# Patient Record
Sex: FEMALE | Race: WHITE | NOT HISPANIC OR LATINO | Employment: OTHER | URBAN - METROPOLITAN AREA
[De-identification: names, ages, dates, MRNs, and addresses within clinical notes are randomized per-mention and may not be internally consistent; named-entity substitution may affect disease eponyms.]

---

## 2017-01-05 ENCOUNTER — GENERIC CONVERSION - ENCOUNTER (OUTPATIENT)
Dept: OTHER | Facility: OTHER | Age: 72
End: 2017-01-05

## 2017-01-17 ENCOUNTER — TRANSCRIBE ORDERS (OUTPATIENT)
Dept: ADMINISTRATIVE | Facility: HOSPITAL | Age: 72
End: 2017-01-17

## 2017-01-17 DIAGNOSIS — N64.9 UNSPECIFIED BREAST DISORDER: Primary | ICD-10-CM

## 2017-01-31 ENCOUNTER — GENERIC CONVERSION - ENCOUNTER (OUTPATIENT)
Dept: OTHER | Facility: OTHER | Age: 72
End: 2017-01-31

## 2017-04-21 ENCOUNTER — HOSPITAL ENCOUNTER (OUTPATIENT)
Dept: ULTRASOUND IMAGING | Facility: CLINIC | Age: 72
Discharge: HOME | End: 2017-04-21
Payer: MEDICARE

## 2017-04-21 DIAGNOSIS — N64.9 BREAST DISORDER: ICD-10-CM

## 2017-04-21 PROCEDURE — 76642 ULTRASOUND BREAST LIMITED: CPT

## 2017-04-24 ENCOUNTER — GENERIC CONVERSION - ENCOUNTER (OUTPATIENT)
Dept: OTHER | Facility: OTHER | Age: 72
End: 2017-04-24

## 2017-06-06 ENCOUNTER — ALLSCRIPTS OFFICE VISIT (OUTPATIENT)
Dept: OTHER | Facility: OTHER | Age: 72
End: 2017-06-06

## 2017-06-06 ENCOUNTER — TRANSCRIBE ORDERS (OUTPATIENT)
Dept: ADMINISTRATIVE | Facility: HOSPITAL | Age: 72
End: 2017-06-06

## 2017-06-06 ENCOUNTER — HOSPITAL ENCOUNTER (OUTPATIENT)
Dept: RADIOLOGY | Facility: HOSPITAL | Age: 72
Discharge: HOME | End: 2017-06-06
Payer: MEDICARE

## 2017-06-06 DIAGNOSIS — M79.672 PAIN OF LEFT FOOT: ICD-10-CM

## 2017-06-06 DIAGNOSIS — R09.89 OTHER SPECIFIED SYMPTOMS AND SIGNS INVOLVING THE CIRCULATORY AND RESPIRATORY SYSTEMS: ICD-10-CM

## 2017-06-06 DIAGNOSIS — Z13.820 ENCOUNTER FOR SCREENING FOR OSTEOPOROSIS: ICD-10-CM

## 2017-06-06 PROCEDURE — 73630 X-RAY EXAM OF FOOT: CPT

## 2017-06-07 ENCOUNTER — GENERIC CONVERSION - ENCOUNTER (OUTPATIENT)
Dept: OTHER | Facility: OTHER | Age: 72
End: 2017-06-07

## 2017-06-23 ENCOUNTER — GENERIC CONVERSION - ENCOUNTER (OUTPATIENT)
Dept: OTHER | Facility: OTHER | Age: 72
End: 2017-06-23

## 2017-07-24 ENCOUNTER — GENERIC CONVERSION - ENCOUNTER (OUTPATIENT)
Dept: OTHER | Facility: OTHER | Age: 72
End: 2017-07-24

## 2017-09-12 ENCOUNTER — ALLSCRIPTS OFFICE VISIT (OUTPATIENT)
Dept: OTHER | Facility: OTHER | Age: 72
End: 2017-09-12

## 2017-09-26 ENCOUNTER — GENERIC CONVERSION - ENCOUNTER (OUTPATIENT)
Dept: OTHER | Facility: OTHER | Age: 72
End: 2017-09-26

## 2017-10-05 ENCOUNTER — HOSPITAL ENCOUNTER (OUTPATIENT)
Dept: NON INVASIVE DIAGNOSTICS | Facility: CLINIC | Age: 72
Discharge: HOME | End: 2017-10-05
Payer: MEDICARE

## 2017-10-05 ENCOUNTER — GENERIC CONVERSION - ENCOUNTER (OUTPATIENT)
Dept: OTHER | Facility: OTHER | Age: 72
End: 2017-10-05

## 2017-10-05 DIAGNOSIS — R09.89 OTHER SPECIFIED SYMPTOMS AND SIGNS INVOLVING THE CIRCULATORY AND RESPIRATORY SYSTEMS: ICD-10-CM

## 2017-10-05 PROCEDURE — 93880 EXTRACRANIAL BILAT STUDY: CPT

## 2017-10-24 DIAGNOSIS — N64.89 OTHER SPECIFIED DISORDERS OF BREAST: ICD-10-CM

## 2017-10-24 DIAGNOSIS — Z12.31 ENCOUNTER FOR SCREENING MAMMOGRAM FOR MALIGNANT NEOPLASM OF BREAST: ICD-10-CM

## 2017-10-25 ENCOUNTER — HOSPITAL ENCOUNTER (OUTPATIENT)
Dept: ULTRASOUND IMAGING | Facility: CLINIC | Age: 72
Discharge: HOME | End: 2017-10-25
Payer: MEDICARE

## 2017-10-25 ENCOUNTER — HOSPITAL ENCOUNTER (OUTPATIENT)
Dept: MAMMOGRAPHY | Facility: CLINIC | Age: 72
Discharge: HOME | End: 2017-10-25
Payer: MEDICARE

## 2017-10-25 DIAGNOSIS — Z12.31 ENCOUNTER FOR SCREENING MAMMOGRAM FOR MALIGNANT NEOPLASM OF BREAST: ICD-10-CM

## 2017-10-25 DIAGNOSIS — N64.89 OTHER SPECIFIED DISORDERS OF BREAST: ICD-10-CM

## 2017-10-25 PROCEDURE — G0202 SCR MAMMO BI INCL CAD: HCPCS

## 2017-10-25 PROCEDURE — 77063 BREAST TOMOSYNTHESIS BI: CPT

## 2017-10-25 PROCEDURE — 76642 ULTRASOUND BREAST LIMITED: CPT

## 2017-10-26 ENCOUNTER — APPOINTMENT (OUTPATIENT)
Dept: PHYSICAL THERAPY | Facility: CLINIC | Age: 72
End: 2017-10-26
Payer: MEDICARE

## 2017-10-26 ENCOUNTER — GENERIC CONVERSION - ENCOUNTER (OUTPATIENT)
Dept: OTHER | Facility: OTHER | Age: 72
End: 2017-10-26

## 2017-10-26 PROCEDURE — G8991 OTHER PT/OT GOAL STATUS: HCPCS

## 2017-10-26 PROCEDURE — 97162 PT EVAL MOD COMPLEX 30 MIN: CPT

## 2017-10-26 PROCEDURE — G8990 OTHER PT/OT CURRENT STATUS: HCPCS

## 2017-10-26 NOTE — PROGRESS NOTES
Assessment  1  Encounter for gynecological examination without abnormal finding (V72 31) (Z01 419)   2  Visit for screening mammogram (V76 12) (Z12 31)    Plan  Atrophy of vagina    · Vagifem 10 MCG Vaginal Tablet (Estradiol); insert one tablet PV 2x week at HS   Rx By: Liyah Kinsey; Dispense: 0 Days ; #:3 X 8 Tablet Box; Refill: 3;For: Atrophy of vagina; ROSA M = N; Faxed To: [facility]    Discussion/Summary  cervical cancer screening is current Breast cancer screening: the risks and benefits of breast cancer screening were discussed, self breast exam technique was taught, monthly self breast exam was advised, mammogram is current and mammogram has been ordered  Colorectal cancer screening: the risks and benefits of colorectal cancer screening were discussed and colorectal cancer screening is current  Osteoporosis screening: the risks and benefits of osteoporosis screening were discussed and bone mineral density testing is current  Advice and education were given regarding aerobic exercise and weight bearing exercise  Normal DEXA in 2013   has her mammogram and US scheduled, reviewed this with her  atrophy - continue vagifem  The patient has the current Goals: See discussion  The patent has the current Barriers: None  Patient is able to Self-Care  Possible side effects of new medications were reviewed with the patient/guardian today  The treatment plan was reviewed with the patient/guardian  The patient/guardian understands and agrees with the treatment plan      Chief Complaint  1  Visit For: Preventive General Multisystem Exam    History of Present Illness  HPI: Pt here for yearly, she has her mammogram and US RX for next month, it will most likely be her last follow up needed  no gyn complaints  She is still using Vagifem  GYN HM, Adult Female [facility]: The patient is being seen for a health maintenance evaluation  The last health maintenance visit was 1 year(s) ago  General Health:  The patient's health since the last visit is described as good  Lifestyle:  She does not have any weight concerns  -- She exercises regularly  -- She does not use tobacco    Reproductive health: the patient is postmenopausal    Screening:      Review of Systems    Constitutional: No fever, no chills, feels well, no tiredness, no recent weight gain or loss  ENT: no ear ache, no loss of hearing, no nosebleeds or nasal discharge, no sore throat or hoarseness  Cardiovascular: no complaints of slow or fast heart rate, no chest pain, no palpitations, no leg claudication or lower extremity edema  Respiratory: no complaints of shortness of breath, no wheezing, no dyspnea on exertion, no orthopnea or PND  Breasts: no complaints of breast pain, breast lump or nipple discharge  Gastrointestinal: no complaints of abdominal pain, no constipation, no nausea or diarrhea, no vomiting, no bloody stools  Genitourinary: no complaints of dysuria, no incontinence, no pelvic pain, no dysmenorrhea, no vaginal discharge or abnormal vaginal bleeding  Musculoskeletal: no complaints of arthralgia, no myalgia, no joint swelling or stiffness, no limb pain or swelling  Integumentary: no complaints of skin rash or lesion, no itching or dry skin, no skin wounds  Neurological: no complaints of headache, no confusion, no numbness or tingling, no dizziness or fainting  Active Problems  1  Acute foot pain, left (729 5) (M79 672)   2  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)   3  Breast asymmetry (611 89) (N64 89)   4  Breast disorder (611 9) (N64 9)   5  Bruit of right carotid artery (785 9) (R09 89)   6  Colon cancer screening (V76 51) (Z12 11)   7  Encounter for gynecological examination without abnormal finding (V72 31) (Z01 419)   8  Glaucoma (365 9) (H40 9)   9   2 para 2 (V49 89) (Z78 9)   10  Denied: History of self breast exam   11  Insomnia (780 52) (G47 00)   12  Macrocytosis (289 89) (D75 89)   13   Medicare annual wellness visit, initial (V70 0) (Z00 00)   14  Screening for human papillomavirus (HPV) (V73 81) (Z11 51)   15  Screening for osteoporosis (V82 81) (Z13 820)   16  Skin lesion (709 9) (L98 9)   17  History of Vasovagal syncope (780 2) (R55)   18  Visit for screening mammogram (V76 12) (Z12 31)    Past Medical History   · 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)   · History of Cough (786 2) (R05)   · History of Dysuria (788 1) (R30 0)   · History of Elevated AST (SGOT) (790 4) (R74 0)   · History of  2   · 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)   · History of acute conjunctivitis (V12 49) (Z86 69)   · History of acute sinusitis (V12 69) (Z87 09)   · Denied: History of self breast exam   · History of upper respiratory infection (V12 09) (Z87 09)   · History of Varicose veins with pain, unspecified laterality (454 8) (I83 819)   · History of Vasovagal syncope (780 2) (R55)    The active problems and past medical history were reviewed and updated today  Surgical History   · History of Biopsy Skin   · History of Complete Colonoscopy   · History of Foot Surgery   · History of Oral Surgery Tooth Extraction   · History of Tonsillectomy    The surgical history was reviewed and updated today  Family History  Mother    · Family history of Adenocarcinoma Of The Uterus (V16 49)   · Family history of Colon Cancer (V16 0)  Father    · Family history of    · Family history of lung cancer (V16 1) (Z80 1)    The family history was reviewed and updated today  Social History   · Always uses seat belt   · Being A Social Drinker   · Caffeine Use   · Drinks wine (V49 89) (Z78 9)   · Marital History - Currently    · Never A Smoker   · No drug use   · Two children   · [facility]  The social history was reviewed and updated today  Current Meds   1  Timolol Maleate 0 5 % Ophthalmic Solution; INSTILL 1 DROP IN RIGHT EYE EVERY 12   HOURS DAILY;    Therapy: 82CJH1487 to Recorded   2  Vagifem 10 MCG Vaginal Tablet; insert one tablet PV 2x week at HS; Therapy: 88Bhb3373 to (Last Rx:89Ytf3459)  Requested for: 07Pyz8693 Ordered   3  Xalatan 0 005 % Ophthalmic Solution; use as directed; Therapy: 19DRO5462 to Recorded   4  Zolpidem Tartrate 5 MG Oral Tablet; 1/2 -1 tab  as needed for sleep; Therapy: 08Apr2013 to (Last SF:65VCH0902) Ordered    Allergies  1  No Known Drug Allergies    Vitals   Recorded: 57ZIJ1468 80:60IA   Systolic 369, LUE, Sitting   Diastolic 62, LUE, Sitting   Height 5 ft 4 in   Weight 114 lb 2 oz   BMI Calculated 19 59   BSA Calculated 1 54   LMP POSTMENOPAUSAL     Physical Exam    Constitutional   General appearance: No acute distress, well appearing and well nourished  Neck   Thyroid: Normal, no thyromegaly  Pulmonary   Auscultation of lungs: Clear to auscultation  Cardiovascular   Auscultation of heart: Normal rate and rhythm, normal S1 and S2, no murmurs  Genitourinary   External genitalia: Normal and no lesions appreciated  Vagina: Normal, no lesions or dryness appreciated  Urethra: Normal     Urethral meatus: Normal     Bladder: Normal, soft, non-tender and no prolapse or masses appreciated  Cervix: Normal, no palpable masses  Uterus: Normal, non-tender, not enlarged, and no palpable masses  Adnexa/parametria: Normal, non-tender and no fullness or masses appreciated  Anus, perineum, and rectum: Normal sphincter tone, no masses, and no prolapse  Chest   Breasts: Normal and no dimpling or skin changes noted  Abdomen   Abdomen: Normal, non-tender, and no organomegaly noted  Liver and spleen: No hepatomegaly or splenomegaly  Examination for hernias: No hernias appreciated  Psychiatric   Orientation to person, place, and time: Normal     Mood and affect: Normal        Signatures   Electronically signed by :  Jostin Buckner DO; Sep 14 2017 12:41PM EST                       (Author)

## 2017-10-27 ENCOUNTER — GENERIC CONVERSION - ENCOUNTER (OUTPATIENT)
Dept: OTHER | Facility: OTHER | Age: 72
End: 2017-10-27

## 2017-10-30 ENCOUNTER — HOSPITAL ENCOUNTER (OUTPATIENT)
Dept: MAMMOGRAPHY | Facility: CLINIC | Age: 72
Discharge: HOME | End: 2017-10-30
Payer: MEDICARE

## 2017-10-30 DIAGNOSIS — Z13.820 ENCOUNTER FOR SCREENING FOR OSTEOPOROSIS: ICD-10-CM

## 2017-10-30 PROCEDURE — 77080 DXA BONE DENSITY AXIAL: CPT

## 2017-10-31 ENCOUNTER — GENERIC CONVERSION - ENCOUNTER (OUTPATIENT)
Dept: OTHER | Facility: OTHER | Age: 72
End: 2017-10-31

## 2017-11-09 ENCOUNTER — ALLSCRIPTS OFFICE VISIT (OUTPATIENT)
Dept: OTHER | Facility: OTHER | Age: 72
End: 2017-11-09

## 2017-11-10 NOTE — PROGRESS NOTES
Assessment    1  Preoperative clearance (V72 84) (Z01 818)   2  Other cataract of left eye (366 19) (H26 8)   3  Glaucoma (365 9) (H40 9)   4  Insomnia (780 52) (G47 00)   5  Osteopenia (733 90) (M85 80)    Plan  Insomnia    · Zolpidem Tartrate 5 MG Oral Tablet (Ambien); 1/2 -1 tab  as needed for sleep    Discussion/Summary  Surgical Clearance: She is at a LOW risk from a cardiovascular standpoint at this time without any additional cardiac testing  Reevaluation needed, if she should present with symptoms prior to surgery/procedure  Surgical clearance faxed to Dr Wei Busch   Pt low risk for low risk procedure - no further w/u needed, forms faxed to Dr Aníbal Romero office  and Glaucoma - for procedure next week - IOLI and stent to be placed, con't f/u and eye gtt as per Optho  - encouraged getting wgt up a bit, wgt bearing exercise and high calcium diet, will start Calcium supplement with Vit D and recheck Dexa in 2 yrs  - Ambien refilled, rare use of the med and no SE noted, call with new/worsening symptoms or SE  on mammo/Dexa/[place]  get flu vaccine after eye surgery  The patient was counseled regarding instructions for management,-- risk factor reductions,-- prognosis,-- patient and family education,-- impressions,-- risks and benefits of treatment options,-- importance of compliance with treatment  Possible side effects of new medications were reviewed with the patient/guardian today  The treatment plan was reviewed with the patient/guardian  The patient/guardian understands and agrees with the treatment plan     Self Referrals: No      Chief Complaint  preoperative clearance      History of Present Illness  Pre-Op Visit (Brief): The patient is being seen for a preoperative visit  The procedure is a(n) cataract with IOLI OS scheduled for Nov 13, 2017 with Dr Wei Busch  The indication for surgery is cataract     Surgical Risk Assessment:  Prior Anesthesia: She had prior anesthesia-- and-- no prior adverse reaction to general anesthesia  Pertinent Past Medical History: CAD without prior MI,-- no CHF,-- no chronic liver disease,-- no acute hepatitis,-- no pulmonary embolism,-- no DVT,-- does not use anticoagulants,-- no diabetes,-- no thyroid disease,-- does not wear dentures,-- no seizure disorder,-- no CVA,-- no asthma,-- no COPD,-- no renal disease-- and-- no obesity  Exercise Capacity: able to walk four blocks without symptoms-- and-- able to walk two flights of stairs without symptoms  Lifestyle Factors: denies tobacco use and two glasses of wine a night  Symptoms: no easy bleeding,-- no easy bruising,-- no frequent nosebleeds,-- no chest pain,-- no cough,-- no dyspnea,-- no edema,-- no palpitations-- and-- no wheezing  Pertinent Family History: family history of a prior adverse reaction to anesthesia,-- ischemic heart disease-- and-- mother with issues waking up from anesthesia, MGM with heart disease, but-- no bleeding problems-- and-- no stroke  Living Situation: home is secure and supportive and no post-op concerns with her living situation  HPI: Pt here for preoperative clearance - she is having L cataract procedure  She has had blurry vision and issues driving in the dark  is asking about Dexa results - reviewed - hip c/w osteopenia  Diet/exercise and calcium supplement reviewed  is requesting her Ambien be refilled  She only uses it once every few mos if she really has a hard time sleeping  Has not had refilled since 2015  Pt notes no SE with the medication  is UTD on mammo  get flu vaccine after cataract surgery      Review of Systems   Constitutional: no fever-- and-- no chills  Eyes: eyesight problems, but-- as noted in HPI   ENT: no sore throat-- and-- no nasal discharge  Cardiovascular: no chest pain,-- no palpitations-- and-- no lower extremity edema  Respiratory: no shortness of breath,-- no cough-- and-- no wheezing  Gastrointestinal: no abdominal pain,-- no constipation-- and-- no diarrhea  Genitourinary: no dysuria  Musculoskeletal: joint stiffness, but-- no joint swelling  Integumentary: no rashes  Neurological: no headache-- and-- no dizziness  Psychiatric: sleep disturbances, but-- no anxiety-- and-- no depression  Endocrine: no muscle weakness  Hematologic/Lymphatic: no tendency for easy bleeding-- and-- no tendency for easy bruising  Active Problems    1  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)   2  Breast disorder (611 9) (N64 9)   3  Bruit of right carotid artery (785 9) (R09 89)   4  Colon cancer screening (V76 51) (Z12 11)   5  Encounter for gynecological examination without abnormal finding (V72 31) (Z01 419)   6  Glaucoma (365 9) (H40 9)   7   2 para 2 (V49 89) (Z78 9)   8  Denied: History of self breast exam   9  Insomnia (780 52) (G47 00)   10  Macrocytosis (289 89) (D75 89)   11  Screening for human papillomavirus (HPV) (V73 81) (Z11 51)   12  Skin lesion (709 9) (L98 9)   13  History of Vasovagal syncope (780 2) (R55)   14  Visit for screening mammogram (V76 12) (Z12 31)    Past Medical History   · History of Acute foot pain, left (729 5) (M38 535)   · History of Breast asymmetry (611 89) (N64 89)   · 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)   · History of Cough (786 2) (R05)   · History of Dysuria (788 1) (R30 0)   · History of Elevated AST (SGOT) (790 4) (R74 0)   · History of  2   · 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)   · History of acute conjunctivitis (V12 49) (Z86 69)   · History of acute sinusitis (V12 69) (Z87 09)   · Denied: History of self breast exam   · History of upper respiratory infection (V12 09) (Z87 09)   · History of Medicare annual wellness visit, initial (V70 0) (Z00 00)   · History of Varicose veins with pain, unspecified laterality (454 8) (I83 819)   · History of Vasovagal syncope (780 2) (R55)    The active problems and past medical history were reviewed and updated today        Surgical History   · History of Biopsy Skin   · History of Complete Colonoscopy   · History of Foot Surgery   · History of Oral Surgery Tooth Extraction   · History of Tonsillectomy    The surgical history was reviewed and updated today  Family History  Mother    · Family history of Adenocarcinoma Of The Uterus (V16 49)   · Family history of Colon Cancer (V16 0)  Father    · Family history of    · Family history of lung cancer (V16 1) (Z80 1)    The family history was reviewed and updated today  Social History     · Always uses seat belt   · Being A Social Drinker   · Caffeine Use   · Drinks wine (V49 89) (Z78 9)   · Marital History - Currently    · Never A Smoker   · No drug use   · Two children   · [facility]  The social history was reviewed and updated today  Current Meds   1  Calcium 600/Vitamin D 600-400 MG-UNIT Oral Tablet; 1 Tab daily; Therapy: 08ZFI2360 to Recorded   2  Timolol Maleate 0 5 % Ophthalmic Solution; INSTILL 1 DROP IN RIGHT EYE EVERY 12 HOURS DAILY; Therapy: 32IIT9911 to Recorded   3  Vagifem 10 MCG Vaginal Tablet; insert one tablet PV 2x week at HS; Therapy: 46Vhy6859 to (Last Rx:65The2023)  Requested for: 38Hvx7099 Ordered   4  Xalatan 0 005 % Ophthalmic Solution; use as directed; Therapy: 39BWQ6170 to Recorded   5  Zolpidem Tartrate 5 MG Oral Tablet; 1/2 -1 tab  as needed for sleep; Therapy: 56Bvw0881 to (Last JZ:89GAT8628) Ordered    The medication list was reviewed and updated today  Allergies  1  No Known Drug Allergies    Vitals   Recorded: 28NDY1995 09:10AM Recorded: 96HBM0079 08:49AM   Temperature  97 2 F   Heart Rate 88 914   Systolic  129   Diastolic  70   Height  5 ft 4 in   Weight  114 lb 12 8 oz   BMI Calculated  19 71   BSA Calculated  1 54       Physical Exam   Constitutional  General appearance: No acute distress, well appearing and well nourished  Eyes  Conjunctiva and lids: No swelling, erythema or discharge     Ears, Nose, Mouth, and Throat  External inspection of ears and nose: Normal    Otoscopic examination: Tympanic membranes translucent with normal light reflex  Canals patent without erythema  Hearing: Normal    Lips, teeth, and gums: Normal, good dentition  Oropharynx: Normal with no erythema, edema, exudate or lesions  Neck  Neck: Supple, symmetric, trachea midline, no masses  Pulmonary  Respiratory effort: No increased work of breathing or signs of respiratory distress  Auscultation of lungs: Clear to auscultation  Cardiovascular  Auscultation of heart: Normal rate and rhythm, normal S1 and S2, no murmurs  Examination of extremities for edema and/or varicosities: Normal    Abdomen  Abdomen: Non-tender, no masses  Lymphatic  Palpation of lymph nodes in neck: No lymphadenopathy  Musculoskeletal  Gait and station: Normal    Psychiatric  Judgment and insight: Normal    Mood and affect: Normal        Results/Data  PHQ-2 Adult Depression Screening 40QUJ8560 08:49AM User, s     Test Name Result Flag Reference   PHQ-2 Adult Depression Score 0       Over the last two weeks, how often have you been bothered by any of the following problems? Little interest or pleasure in doing things: Not at all - 0 Feeling down, depressed, or hopeless: Not at all - 0   PHQ-2 Adult Depression Screening Negative       (1) LIPID PANEL, FASTING 46REF8638 11:54AM Antoni Stage     Test Name Result Flag Reference   CHOLESTEROL 191 mg/dL     HDL,DIRECT 98 mg/dL H 40-60   Specimen collection should occur prior to Metamizole administration due to the potential for falsely depressed results     LDL CHOLESTEROL CALCULATED 83 mg/dL  0-100     Triglyceride:       Normal              <150 mg/dl      Borderline High    150-199 mg/dl      High               200-499 mg/dl      Very High          >499 mg/dl Cholesterol:       Desirable        <200 mg/dl     Borderline High  200-239 mg/dl     High             >239 mg/dl HDL Cholesterol:      High    >59 mg/dL     Low     <41 mg/dL LDL CALCULATED: This screening LDL is a calculated result  It does not have the accuracy of the Direct Measured LDL in the monitoring of patients with hyperlipidemia and/or statin therapy  Direct Measure LDL (QCK815) must be ordered separately in these patients  TRIGLYCERIDES 48 mg/dL  <=150   Specimen collection should occur prior to N-Acetylcysteine or Metamizole administration due to the potential for falsely depressed results  (1) HEMOGLOBIN A1C 08MMQ2924 11:54AM [facility]     Test Name Result Flag Reference   HEMOGLOBIN A1C 5 3 %  4 2-6 3   EST  AVG  GLUCOSE 105 mg/dl         End of Encounter Meds    1  Vagifem 10 MCG Vaginal Tablet (Estradiol); insert one tablet PV 2x week at HS; Therapy: 29Aug2016 to (Last Rx:12Sep2017)  Requested for: 12Sep2017 Ordered    2  Timolol Maleate 0 5 % Ophthalmic Solution; INSTILL 1 DROP IN RIGHT EYE EVERY 12 HOURS DAILY; Therapy: 10WAY0871 to Recorded   3  Xalatan 0 005 % Ophthalmic Solution (Latanoprost); use as directed; Therapy: 01XJF3526 to Recorded    4  Zolpidem Tartrate 5 MG Oral Tablet (Ambien); 1/2 -1 tab  as needed for sleep; Therapy: 08Apr2013 to (Evaluate:29Nov2017); Last Rx:09Nov2017 Ordered    5  Calcium 600/Vitamin D 600-400 MG-UNIT Oral Tablet; 1 Tab daily;  Therapy: 66XFM7961 to Recorded    Signatures   Electronically signed by : Alex Jackson DO; Nov 9 2017  9:20AM EST                       (Author)

## 2017-11-22 ENCOUNTER — GENERIC CONVERSION - ENCOUNTER (OUTPATIENT)
Dept: OTHER | Facility: OTHER | Age: 72
End: 2017-11-22

## 2017-11-30 ENCOUNTER — GENERIC CONVERSION - ENCOUNTER (OUTPATIENT)
Dept: FAMILY MEDICINE CLINIC | Facility: HOSPITAL | Age: 72
End: 2017-11-30

## 2018-01-10 NOTE — RESULT NOTES
Discussion/Summary   please notify pt that she did fracture her 4th toe - can tsering tape it and ice it - would refrain from alot of high impact exercise the next few weeks     Verified Results  * XR FOOT 3+ VIEW LEFT 06Jun2017 03:31PM Lisa Claire Order Number: [id number]     Test Name Result Flag Reference   XR FOOT 3+ VW LEFT (Report)     LEFT FOOT     INDICATION: Left foot pain  Trauma  COMPARISON: June 20, 2006  VIEWS: 3     IMAGES: 3     FINDINGS:     There is a nondisplaced fracture of the diaphysis of the proximal left 4th phalanx  No other fractures are identified  No malalignment  Soft tissue swelling is seen  IMPRESSION:     There is a nondisplaced fracture of the diaphysis of the proximal left 4th phalanx          ##imslh##imslh       Workstation performed: [id number]     Signed by:   Michaela Dunn MD   6/7/17        Pt aware

## 2018-01-11 NOTE — CONSULTS
Chief Complaint  preoperative clearance      History of Present Illness  Pre-Op Visit (Brief): The patient is being seen for a preoperative visit  The procedure is a(n) cataract with IOLI OS scheduled for Nov 13, 2017 with Dr Mary Pabon  The indication for surgery is cataract  Surgical Risk Assessment:   Prior Anesthesia: She had prior anesthesia and no prior adverse reaction to general anesthesia  Pertinent Past Medical History: CAD without prior MI, no CHF, no chronic liver disease, no acute hepatitis, no pulmonary embolism, no DVT, does not use anticoagulants, no diabetes, no thyroid disease, does not wear dentures, no seizure disorder, no CVA, no asthma, no COPD, no renal disease and no obesity  Exercise Capacity: able to walk four blocks without symptoms and able to walk two flights of stairs without symptoms  Lifestyle Factors: denies tobacco use and two glasses of wine a night  Symptoms: no easy bleeding, no easy bruising, no frequent nosebleeds, no chest pain, no cough, no dyspnea, no edema, no palpitations and no wheezing  Pertinent Family History: family history of a prior adverse reaction to anesthesia, ischemic heart disease and mother with issues waking up from anesthesia, MGM with heart disease, but no bleeding problems and no stroke  Living Situation: home is secure and supportive and no post-op concerns with her living situation  HPI: Pt here for preoperative clearance - she is having L cataract procedure  She has had blurry vision and issues driving in the dark  She is asking about Dexa results - reviewed - hip c/w osteopenia  Diet/exercise and calcium supplement reviewed  Pt is requesting her Ambien be refilled  She only uses it once every few mos if she really has a hard time sleeping  Has not had refilled since 2015  Pt notes no SE with the medication       She is UTD on mammo    Will get flu vaccine after cataract surgery      Review of Systems    Constitutional: no fever and no chills  Eyes: eyesight problems, but as noted in HPI    ENT: no sore throat and no nasal discharge  Cardiovascular: no chest pain, no palpitations and no lower extremity edema  Respiratory: no shortness of breath, no cough and no wheezing  Gastrointestinal: no abdominal pain, no constipation and no diarrhea  Genitourinary: no dysuria  Musculoskeletal: joint stiffness, but no joint swelling  Integumentary: no rashes  Neurological: no headache and no dizziness  Psychiatric: sleep disturbances, but no anxiety and no depression  Endocrine: no muscle weakness  Hematologic/Lymphatic: no tendency for easy bleeding and no tendency for easy bruising  Active Problems    1  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)   2  Breast disorder (611 9) (N64 9)   3  Bruit of right carotid artery (785 9) (R09 89)   4  Colon cancer screening (V76 51) (Z12 11)   5  Encounter for gynecological examination without abnormal finding (V72 31) (Z01 419)   6  Glaucoma (365 9) (H40 9)   7   2 para 2 (V49 89) (Z78 9)   8  Denied: History of self breast exam   9  Insomnia (780 52) (G47 00)   10  Macrocytosis (289 89) (D75 89)   11  Screening for human papillomavirus (HPV) (V73 81) (Z11 51)   12  Skin lesion (709 9) (L98 9)   13  History of Vasovagal syncope (780 2) (R55)   14   Visit for screening mammogram (V76 12) (Z12 31)    Past Medical History    · History of Acute foot pain, left (729 5) (P30 450)   · History of Breast asymmetry (611 89) (N64 89)   · 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)   · History of Cough (786 2) (R05)   · History of Dysuria (788 1) (R30 0)   · History of Elevated AST (SGOT) (790 4) (R74 0)   · History of  2   · 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)   · History of acute conjunctivitis (V12 49) (Z86 69)   · History of acute sinusitis (V12 69) (Z87 09)   · Denied: History of self breast exam   · History of upper respiratory infection (V12 09) (Z87 09)   · History of Medicare annual wellness visit, initial (V70 0) (Z00 00)   · History of Varicose veins with pain, unspecified laterality (454 8) (I83 819)   · History of Vasovagal syncope (780 2) (R55)    The active problems and past medical history were reviewed and updated today  Surgical History    · History of Biopsy Skin   · History of Complete Colonoscopy   · History of Foot Surgery   · History of Oral Surgery Tooth Extraction   · History of Tonsillectomy    The surgical history was reviewed and updated today  Family History    · Family history of Adenocarcinoma Of The Uterus (V16 49)   · Family history of Colon Cancer (V16 0)    · Family history of    · Family history of lung cancer (V16 1) (Z80 1)    The family history was reviewed and updated today  Social History    · Always uses seat belt   · Being A Social Drinker   · Caffeine Use   · Drinks wine (V49 89) (Z78 9)   · Marital History - Currently    · Never A Smoker   · No drug use   · Two children   · [facility]  The social history was reviewed and updated today  Current Meds   1  Calcium 600/Vitamin D 600-400 MG-UNIT Oral Tablet; 1 Tab daily; Therapy: 69AVO9835 to Recorded   2  Timolol Maleate 0 5 % Ophthalmic Solution; INSTILL 1 DROP IN RIGHT EYE EVERY 12   HOURS DAILY; Therapy: 88DPW3792 to Recorded   3  Vagifem 10 MCG Vaginal Tablet; insert one tablet PV 2x week at HS; Therapy: 44Jhu8475 to (Last Rx:57Voi8772)  Requested for: 75Col1392 Ordered   4  Xalatan 0 005 % Ophthalmic Solution; use as directed; Therapy: 72PCK9457 to Recorded   5  Zolpidem Tartrate 5 MG Oral Tablet; 1/2 -1 tab  as needed for sleep; Therapy: 2013 to (Last TO:33SBL0113) Ordered    The medication list was reviewed and updated today  Allergies    1   No Known Drug Allergies    Vitals  Signs    Heart Rate: 88   Temperature: 97 2 F  Heart Rate: 164  Systolic: 238  Diastolic: 70  Height: 5 ft 4 in  Weight: 114 lb 12 8 oz  BMI Calculated: 19 71  BSA Calculated: 1 54    Physical Exam    Constitutional   General appearance: No acute distress, well appearing and well nourished  Eyes   Conjunctiva and lids: No swelling, erythema or discharge  Ears, Nose, Mouth, and Throat   External inspection of ears and nose: Normal     Otoscopic examination: Tympanic membranes translucent with normal light reflex  Canals patent without erythema  Hearing: Normal     Lips, teeth, and gums: Normal, good dentition  Oropharynx: Normal with no erythema, edema, exudate or lesions  Neck   Neck: Supple, symmetric, trachea midline, no masses  Pulmonary   Respiratory effort: No increased work of breathing or signs of respiratory distress  Auscultation of lungs: Clear to auscultation  Cardiovascular   Auscultation of heart: Normal rate and rhythm, normal S1 and S2, no murmurs  Examination of extremities for edema and/or varicosities: Normal     Abdomen   Abdomen: Non-tender, no masses  Lymphatic   Palpation of lymph nodes in neck: No lymphadenopathy  Musculoskeletal   Gait and station: Normal     Psychiatric   Judgment and insight: Normal     Mood and affect: Normal        Results/Data  PHQ-2 Adult Depression Screening 51ZZE9697 08:49AM User, Rachels     Test Name Result Flag Reference   PHQ-2 Adult Depression Score 0     Over the last two weeks, how often have you been bothered by any of the following problems? Little interest or pleasure in doing things: Not at all - 0  Feeling down, depressed, or hopeless: Not at all - 0   PHQ-2 Adult Depression Screening Negative       (1) LIPID PANEL, FASTING 53IKR6591 11:54AM Janine Lewis     Test Name Result Flag Reference   CHOLESTEROL 191 mg/dL     HDL,DIRECT 98 mg/dL H 40-60   Specimen collection should occur prior to Metamizole administration due to the potential for falsely depressed results     LDL CHOLESTEROL CALCULATED 83 mg/dL  0-100   Triglyceride:         Normal              <150 mg/dl Borderline High    150-199 mg/dl       High               200-499 mg/dl       Very High          >499 mg/dl  Cholesterol:         Desirable        <200 mg/dl      Borderline High  200-239 mg/dl      High             >239 mg/dl  HDL Cholesterol:        High    >59 mg/dL      Low     <41 mg/dL  LDL CALCULATED:    This screening LDL is a calculated result  It does not have the accuracy of the Direct Measured LDL in the monitoring of patients with hyperlipidemia and/or statin therapy  Direct Measure LDL (XKG547) must be ordered separately in these patients  TRIGLYCERIDES 48 mg/dL  <=150   Specimen collection should occur prior to N-Acetylcysteine or Metamizole administration due to the potential for falsely depressed results  (1) HEMOGLOBIN A1C 74TTU9319 11:54AM Jankathrine Tolliver     Test Name Result Flag Reference   HEMOGLOBIN A1C 5 3 %  4 2-6 3   EST  AVG  GLUCOSE 105 mg/dl         Assessment    1  Preoperative clearance (V72 84) (Z01 818)   2  Other cataract of left eye (366 19) (H26 8)   3  Glaucoma (365 9) (H40 9)   4  Insomnia (780 52) (G47 00)   5  Osteopenia (733 90) (M85 80)    Plan  Insomnia    · Zolpidem Tartrate 5 MG Oral Tablet (Ambien); 1/2 -1 tab  as needed for sleep    Discussion/Summary  Surgical Clearance: She is at a LOW risk from a cardiovascular standpoint at this time without any additional cardiac testing  Reevaluation needed, if she should present with symptoms prior to surgery/procedure  Surgical clearance faxed to Dr Julianne Padgett        Pt low risk for low risk procedure - no further w/u needed, forms faxed to Dr Kamilah Miller office    Cataract and Glaucoma - for procedure next week - IOLI and stent to be placed, con't f/u and eye gtt as per Optho    Osteopenia - encouraged getting wgt up a bit, wgt bearing exercise and high calcium diet, will start Calcium supplement with Vit D and recheck Dexa in 2 yrs    Insomnia - Ambien refilled, rare use of the med and no SE noted, call with new/worsening symptoms or SE    UTD on mammo/Dexa/[place]    Will get flu vaccine after eye surgery  The patient was counseled regarding instructions for management, risk factor reductions, prognosis, patient and family education, impressions, risks and benefits of treatment options, importance of compliance with treatment  Possible side effects of new medications were reviewed with the patient/guardian today  The treatment plan was reviewed with the patient/guardian  The patient/guardian understands and agrees with the treatment plan     Self Referrals: No      End of Encounter Meds    1  Vagifem 10 MCG Vaginal Tablet (Estradiol); insert one tablet PV 2x week at HS; Therapy: 19Fnp0850 to (Last Rx:12Sep2017)  Requested for: 12Sep2017 Ordered    2  Timolol Maleate 0 5 % Ophthalmic Solution; INSTILL 1 DROP IN RIGHT EYE EVERY 12   HOURS DAILY; Therapy: 11LEZ9561 to Recorded   3  Xalatan 0 005 % Ophthalmic Solution (Latanoprost); use as directed; Therapy: 34TNA1107 to Recorded    4  Zolpidem Tartrate 5 MG Oral Tablet (Ambien); 1/2 -1 tab  as needed for sleep; Therapy: 08Apr2013 to (Evaluate:29Nov2017); Last Rx:09Nov2017 Ordered    5  Calcium 600/Vitamin D 600-400 MG-UNIT Oral Tablet; 1 Tab daily;    Therapy: 47DYX2889 to Recorded    Signatures   Electronically signed by : Chente Jacobsen DO; Nov 9 2017  9:20AM EST                       (Author)

## 2018-01-11 NOTE — MISCELLANEOUS
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)   5  Dysuria (788 1) (R30 0)   6  Elevated AST (SGOT) (790 4) (R74 0)   7  Encounter for gynecological examination without abnormal finding (V72 31) (Z01 419)   8  Encounter for screening mammogram for malignant neoplasm of breast (V76 12)   (Z12 31)   9  Glaucoma (365 9) (H40 9)   10  History of self breast exam   11  Insomnia (780 52) (G47 00)   12  Macrocytosis (289 89) (D75 89)   13  Menopause (627 2)   14  Post-menopausal atrophic vaginitis (627 3) (N95 2)   15  Screening for human papillomavirus (HPV) (V73 81) (Z11 51)   16  Strain Of Biceps Tendon (840 8)   17  Vasovagal syncope (780 2) (R55)    Current Meds   1  Calcium 500/D 500-400 MG-UNIT Oral Tablet Chewable; 1 tab po bid; Therapy: 29KIL3210 to Recorded   2  Vagifem 10 MCG Vaginal Tablet; 1 tablet pv twice weekly; Therapy: 49XHJ3819 to (Evaluate:85Rxn6139)  Requested for: 91Ptj6818; Last   Rx:97Erk9545 Ordered   3  Xalatan 0 005 % Ophthalmic Solution (Latanoprost); use as directed; Therapy: 13WUR6551 to Recorded   4  Zolpidem Tartrate 5 MG Oral Tablet (Ambien); 1/2 -1 tab  as needed for sleep; Therapy: 66Esw8482 to (Last BB:66AOI2956) Ordered    Allergies    1  No Known Drug Allergies    Plan  Breast disorder    · *US BREAST RIGHT LIMITED (DIAGNOSTIC); Status:Hold For [employer];  Requested for:01Oct2016;   Encounter for screening mammogram for malignant neoplasm of breast    · * MAMMO SCREENING BILATERAL W CAD; Status:Hold For - Scheduling,Retrospective  Authorization;  Requested for:01Oct2016;     Signatures   Electronically signed by : Tyrese Kim, ; Apr 1 2016 11:30AM EST                       (Author)

## 2018-01-12 NOTE — RESULT NOTES
Discussion/Summary    please notify pt that her DEXA scan was consistent with Osteopenia - not normal but not osteoporosis but at risk for this - increase calcium in diet (yogurt/milk/cheese) and increase wgt bearing exercise, will d/w pt in detail at next appt    Patient notified of results         Verified Results  * DXA [address] [id number] 02:50PM Lisa Rangel Order Number: [id number]    - Patient Instructions: To schedule this appointment, please contact Central Scheduling at [phone]  Test Name Result Flag Reference   DXA BONE DENSITY SPINE HIP AND PELVIS (Report)     CENTRAL DXA SCAN     CLINICAL HISTORY:  81year old post-menopausal  female risk factors include estrogen deficient     TECHNIQUE: Bone densitometry was performed using a [employer] C bone densitometer  Regions of interest appear properly placed  There are no obvious fractures or other confounding variables which could limit the study  COMPARISON: Lumbar dextroscoliosis noted  RESULTS:    LUMBAR SPINE: L1, L3:   BMD 1 030 gm/cm2   T-score 0 2   Z-score 2 2     LEFT TOTAL HIP:   BMD 0 817 gm/cm2   T-score -1 0   Z-score 0 5     LEFT FEMORAL NECK:   BMD 0 714 gm/cm2   T-score -1 2   Z-score 0 6     LEFT FOREARM :   BMD 0 628 gm/sq-cm,   T-score is 1 2   Z-score is 3 5          IMPRESSION:   1  Based on the [facility] classification, the T-score of -1 2 in the left femoral neck is consistent with low bone mineral density  2  Any secondary causes of low bone mineral density should be excluded prior to treatment, if clinically indicated  3  A daily intake of at least 1200 mg calcium and 800 to 1000 IU of Vitamin D, as well as weight bearing and muscle strengthening exercise, fall prevention and avoidance of tobacco and excessive alcohol intake as basic preventive measures are suggested  4  Repeat DXA in 18 - 24 months, on the same machine, as clinically indicated         The 10 year risk of hip fracture is 1 0%, with the 10 year risk of major osteoporotic fracture being 7 9%, as calculated by the [facility] fracture risk assessment tool (FRAX)  The current NOF guidelines recommend treating patients with FRAX 10 year risk score   of >3% for hip fracture and >20% for major osteoporotic fracture  WHO CLASSIFICATION:   Normal (a T-score of -1 0 or higher)   Low bone mineral density (a T-score of less than -1 0 but higher than -2 5)   Osteoporosis (a T-score of -2 5 or less)   Severe osteoporosis (a T-score of -2 5 or less with a fragility fracture)             Workstation performed: [id number]     Signed by:   Mark Garvey MD   10/30/17

## 2018-01-13 VITALS
BODY MASS INDEX: 19.63 KG/M2 | DIASTOLIC BLOOD PRESSURE: 72 MMHG | TEMPERATURE: 98.6 F | HEART RATE: 82 BPM | SYSTOLIC BLOOD PRESSURE: 122 MMHG | HEIGHT: 64 IN | WEIGHT: 115 LBS

## 2018-01-13 VITALS
TEMPERATURE: 97.2 F | WEIGHT: 114.8 LBS | HEART RATE: 88 BPM | DIASTOLIC BLOOD PRESSURE: 70 MMHG | SYSTOLIC BLOOD PRESSURE: 122 MMHG | HEIGHT: 64 IN | BODY MASS INDEX: 19.6 KG/M2

## 2018-01-14 VITALS
DIASTOLIC BLOOD PRESSURE: 62 MMHG | BODY MASS INDEX: 19.49 KG/M2 | SYSTOLIC BLOOD PRESSURE: 102 MMHG | WEIGHT: 114.13 LBS | HEIGHT: 64 IN

## 2018-01-15 NOTE — MISCELLANEOUS
Message   Date: 2017 9:06 AM EST, Recorded By: Michell Matthews For: Fina Pantoja   Reason: Renew Medication   Called patient regarding Rx refill fax request from [facility]  Patient states has new insurance and new mail order pharmacy  Pt will schedule yearly for end    Escript sent for 90 day supply of Vagifem  Active Problems    1  Acute foot pain, left (729 5) (M79 672)   2  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)   3  Breast asymmetry (611 89) (N64 89)   4  Breast disorder (611 9) (N64 9)   5  Bruit of right carotid artery (785 9) (R09 89)   6  Colon cancer screening (V76 51) (Z12 11)   7  Encounter for gynecological examination without abnormal finding (V72 31) (Z01 419)   8  Glaucoma (365 9) (H40 9)   9   2 para 2 (V49 89) (Z78 9)   10  Denied: History of self breast exam   11  Insomnia (780 52) (G47 00)   12  Macrocytosis (289 89) (D75 89)   13  Medicare annual wellness visit, initial (V70 0) (Z00 00)   14  Screening for human papillomavirus (HPV) (V73 81) (Z11 51)   15  Screening for osteoporosis (V82 81) (Z13 820)   16  Skin lesion (709 9) (L98 9)   17  History of Vasovagal syncope (780 2) (R55)   18  Visit for screening mammogram (V76 12) (Z12 31)    Current Meds   1  Timolol Maleate 0 5 % Ophthalmic Solution; INSTILL 1 DROP IN RIGHT EYE EVERY 12   HOURS DAILY; Therapy: 90NIP1307 to Recorded   2  Vagifem 10 MCG Vaginal Tablet; insert one tablet PV 2x week at HS; Therapy: 31Cge6415 to (Last Rx:70Szg0065)  Requested for: 10Qiu5286 Ordered   3  Xalatan 0 005 % Ophthalmic Solution (Latanoprost); use as directed; Therapy: 47GIG4401 to Recorded   4  Zolpidem Tartrate 5 MG Oral Tablet (Ambien); 1/2 -1 tab  as needed for sleep; Therapy: 2013 to (Last YT:61HEN7975) Ordered    Allergies    1   No Known Drug Allergies    Plan  Atrophy of vagina    · Vagifem 10 MCG Vaginal Tablet; insert one tablet PV 2x week at [place] [facility]   Electronically signed by : Pablo Torres, ; Jun 23 2017  9:09AM EST                       (Author)

## 2018-01-15 NOTE — MISCELLANEOUS
Message   Recorded as Task   Date: 2017 05:14 PM, Created By: Estefania Orozco   Task Name: Go to Result   Assigned To: Alfonso Stoll   Regarding Patient: Meri Garcia, Status: Active   Comment:    Estefania Orozco - 2017 5:14 PM     TASK CREATED  right breast US in 6 months along with b/l screening mammogram   Jennifer Hsu - 2017 8:17 AM     TASK EDITED  scripts mailed to pt        Active Problems    1  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)   2  Breast asymmetry (611 89) (N64 89)   3  Breast disorder (611 9) (N64 9)   4  Elevated AST (SGOT) (790 4) (R74 0)   5  Encounter for gynecological examination without abnormal finding (V72 31) (Z01 419)   6  Glaucoma (365 9) (H40 9)   7   2 para 2 (V49 89) (Z78 9)   8  Denied: History of self breast exam   9  Insomnia (780 52) (G47 00)   10  Macrocytosis (289 89) (D75 89)   11  Screening for human papillomavirus (HPV) (V73 81) (Z11 51)   12  Skin lesion (709 9) (L98 9)   13  Varicose veins with pain, unspecified laterality (454 8) (I83 819)   14  History of Vasovagal syncope (780 2) (R55)   15  Visit for screening mammogram (V76 12) (Z12 31)    Current Meds   1  Vagifem 10 MCG Vaginal Tablet; insert one tablet PV 2x week at HS; Therapy: 40Ceq8054 to (Last Rx:02Yhh4636)  Requested for: 46Xrr6984 Ordered   2  Xalatan 0 005 % Ophthalmic Solution (Latanoprost); use as directed; Therapy: 05ZAQ5884 to Recorded   3  Zolpidem Tartrate 5 MG Oral Tablet (Ambien); 1/2 -1 tab  as needed for sleep; Therapy: 2013 to (Last BK:44EYW5259) Ordered    Allergies    1  No Known Drug Allergies    Plan  Breast asymmetry    · *US BREAST RIGHT LIMITED (DIAGNOSTIC); Status:Hold For -  [employer]; Requested VVF:59QNC5621;   Visit for screening mammogram    · * MAMMO SCREENING BILATERAL W CAD; Status:Hold For - Scheduling,Retrospective  Authorization;  Requested LFN:[id number];     Signatures   Electronically signed by : Evelyn Mejia Nadir Gutiérrez, ; Apr 24 2017  8:17AM EST                       (Author)

## 2018-01-15 NOTE — RESULT NOTES
Discussion/Summary    please notify pt that her CUS was nml - no sign of significant narrowing/plaque on the US, will d/w pt in detail at next appt    Patient notified of results         Verified Results  VAS CAROTID COMPLETE STUDY 88WHT9428 09:31AM Gaby Aguirre Order Number: [id number]    - Patient Instructions: To schedule this appointment, please contact Central Scheduling at [phone]  Test Name Result Flag Reference   VAS CAROTID COMPLETE STUDY (Report)     [facility] REPORT   CLINICAL:   Indications: Bruit [R09 89]  Patient presents for a general health evaluation   secondary to other cardiovascular symptoms  Patient is asymptomatic from a   cerebral vascular standpoint  FINDINGS:      Segment   Rig           Left              PSV EDV (cm/s) Ratio PSV EDV (cm/s) Ratio    Dist  ICA   74     26  1 16  53     24  0 83    Mid  ICA   41     17  0 64  37     14  0 58    Prox  ICA   61     14  0 95  55     13  0 85    Dist CCA   61     20     61     20        Mid CCA    64     20  0 81  64     20  0 93    Prox CCA   79     22     69     18        Ext Carotid  79     15  1 23  68     14  1 06    Prox Vert   36      9     55     17        Subclavian  79      0     89      0                 CONCLUSION:   Impression   RIGHT:   There is no evidence of significant stenosis noted  Minimal intimal thickening   is noted in the internal carotid artery  Vertebral artery flow is antegrade  There is no significant subclavian artery   disease  LEFT:   There is no evidence of significant stenosis noted  Minimal intimal thickening   is noted in the internal carotid artery  Vertebral artery flow is antegrade  There is no significant subclavian artery   disease        Internal carotid artery stenosis determination by consensus criteria from:   Kayla Jung , et al  Carotid Artery Stenosis: Gray-Scale and Doppler US Diagnosis   - Society of Radiologists in [address] [place] [address], Radiology 2003;   [phone]        SIGNATURE:   Electronically Signed by: Nancy Ayers MD on 2017-10-05 11:52:48 AM

## 2018-01-16 NOTE — MISCELLANEOUS
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)   5  Dysuria (788 1) (R30 0)   6  Elevated AST (SGOT) (790 4) (R74 0)   7  Encounter for gynecological examination without abnormal finding (V72 31) (Z01 419)   8  Glaucoma (365 9) (H40 9)   9   2 para 2 (V49 89) (Z78 9)   10  Denied: History of self breast exam   11  Insomnia (780 52) (G47 00)   12  Macrocytosis (289 89) (D75 89)   13  Screening for human papillomavirus (HPV) (V73 81) (Z11 51)   14  Vasovagal syncope (780 2) (R55)    Current Meds   1  Vagifem 10 MCG Vaginal Tablet; 1 tablet pv twice weekly; Therapy: 66DWI5859 to (Evaluate:02Ygo6128)  Requested for: 53Rot0188; Last   Rx:13Cjf6089 Ordered   2  Vagifem 10 MCG Vaginal Tablet; insert one tablet PV 2x week at HS; Therapy: 56Lry2668 to (Last Rx:75Fhe8833)  Requested for: 83Nos5084 Ordered   3  Xalatan 0 005 % Ophthalmic Solution (Latanoprost); use as directed; Therapy: 92ZPN5951 to Recorded   4  Zolpidem Tartrate 5 MG Oral Tablet (Ambien); 1/2 -1 tab  as needed for sleep; Therapy: 42Eea4132 to (Last YW:61MAH4453) Ordered    Allergies    1  No Known Drug Allergies    Plan  Breast disorder    · *US BREAST RIGHT LIMITED (DIAGNOSTIC); Status:Hold For -  [employer];  Requested for:2017;     Signatures   Electronically signed by : Todd Calderón, ; Oct  5 2016  1:25PM EST                       (Author)

## 2018-01-17 NOTE — MISCELLANEOUS
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)   3  Breast asymmetry (611 89) (N64 89)   4  Breast disorder (611 9) (N64 9)   5  Bruit of right carotid artery (785 9) (R09 89)   6  Colon cancer screening (V76 51) (Z12 11)   7  Encounter for gynecological examination without abnormal finding (2 31) (Z01 419)   8  Glaucoma (365 9) (H40 9)   9   2 para 2 (V49 89) (Z78 9)   10  Denied: History of self breast exam   11  Insomnia (780 52) (G47 00)   12  Macrocytosis (289 89) (D75 89)   13  Medicare annual wellness visit, initial (V70 0) (Z00 00)   14  Screening for human papillomavirus (HPV) (V73 81) (Z11 51)   15  Screening for osteoporosis (V82 81) (Z13 820)   16  Skin lesion (709 9) (L98 9)   17  History of Vasovagal syncope (780 2) (R55)   18  Visit for screening mammogram (V76 12) (Z12 31)    Current Meds   1  Timolol Maleate 0 5 % Ophthalmic Solution; INSTILL 1 DROP IN RIGHT EYE EVERY 12   HOURS DAILY; Therapy: 88MCP8908 to Recorded   2  Vagifem 10 MCG Vaginal Tablet (Estradiol); insert one tablet PV 2x week at HS; Therapy: 79Mcd4916 to (Last Rx:[id number])  Requested for: 99Zwa0203 Ordered   3  Xalatan 0 005 % Ophthalmic Solution (Latanoprost); use as directed; Therapy: 07JXE1145 to Recorded   4  Zolpidem Tartrate 5 MG Oral Tablet (Ambien); 1/2 -1 tab  as needed for sleep; Therapy: 02Oel0240 to (Last RA:[id number]) Ordered    Allergies    1   No Known Drug Allergies    Signatures   Electronically signed by : Adrianne Madison, ; Oct 27 2017 9:05AM EST                       (Author)

## 2018-01-22 LAB — HM COLONOSCOPY: NORMAL

## 2018-04-11 ENCOUNTER — OFFICE VISIT (OUTPATIENT)
Dept: FAMILY MEDICINE CLINIC | Facility: HOSPITAL | Age: 73
End: 2018-04-11
Payer: MEDICARE

## 2018-04-11 VITALS
SYSTOLIC BLOOD PRESSURE: 122 MMHG | DIASTOLIC BLOOD PRESSURE: 78 MMHG | BODY MASS INDEX: 20.94 KG/M2 | RESPIRATION RATE: 12 BRPM | HEIGHT: 63 IN | TEMPERATURE: 97.1 F | WEIGHT: 118.2 LBS | HEART RATE: 88 BPM

## 2018-04-11 DIAGNOSIS — J02.9 ACUTE PHARYNGITIS, UNSPECIFIED ETIOLOGY: Primary | ICD-10-CM

## 2018-04-11 PROCEDURE — 99213 OFFICE O/P EST LOW 20 MIN: CPT | Performed by: FAMILY MEDICINE

## 2018-04-11 RX ORDER — ESTRADIOL 10 UG/1
INSERT VAGINAL
COMMUNITY
Start: 2016-08-29 | End: 2018-08-28 | Stop reason: SDUPTHER

## 2018-04-11 RX ORDER — ZOLPIDEM TARTRATE 5 MG/1
TABLET ORAL
COMMUNITY
Start: 2013-04-08 | End: 2018-09-12 | Stop reason: SDUPTHER

## 2018-04-11 NOTE — PATIENT INSTRUCTIONS

## 2018-04-11 NOTE — PROGRESS NOTES
[facility]  [id number] [address]  Tel: [phone]    Patient is here for an acute visit    Sore Throat    This is a new problem  The current episode started yesterday  The problem has been gradually improving  There has been no fever  The pain is mild  Associated symptoms include congestion  Pertinent negatives include no abdominal pain, coughing, diarrhea, drooling, ear discharge, ear pain, headaches, hoarse voice, plugged ear sensation, neck pain, shortness of breath, stridor, swollen glands, trouble swallowing or vomiting  She has had no exposure to strep or mono            -----------------------------  Review of Systems   Constitutional: Negative  Negative for activity change, appetite change, chills, diaphoresis, fatigue and fever  HENT: Positive for congestion and sore throat  Negative for drooling, ear discharge, ear pain, facial swelling, hoarse voice and trouble swallowing  Respiratory: Negative  Negative for apnea, cough, chest tightness, shortness of breath and stridor  Cardiovascular: Negative  Negative for chest pain and palpitations  Gastrointestinal: Negative  Negative for abdominal distention, abdominal pain, blood in stool, constipation, diarrhea, nausea and vomiting  Genitourinary: Negative  Negative for difficulty urinating, dysuria, flank pain and frequency  Musculoskeletal: Negative for neck pain  Neurological: Negative for headaches  Social Hx reviewed:    Social History     Social History    Marital status: /Civil Union     Spouse name: N/A    Number of children: N/A    Years of education: N/A     Occupational History    Not on file       Social History Main Topics    Smoking status: Never Smoker    Smokeless tobacco: Never Used    Alcohol use Yes      Comment: 1 per day    Drug use: No    Sexual activity: Not on file     Other Topics Concern    Not on file     Social History Narrative    No narrative on file History reviewed  No pertinent past medical history  No Known Allergies      Vitals:    04/11/18 1007   BP: 122/78   Pulse: 88   Resp: 12   Temp: (!) 97 1 °F (36 2 °C)     Physical Exam   Constitutional: She appears well-developed and well-nourished  HENT:   Head: Normocephalic and atraumatic  Right Ear: Tympanic membrane, external ear and ear canal normal    Left Ear: Tympanic membrane, external ear and ear canal normal    Nose: No mucosal edema or rhinorrhea  Right sinus exhibits no maxillary sinus tenderness and no frontal sinus tenderness  Left sinus exhibits no maxillary sinus tenderness and no frontal sinus tenderness  Mild pharyngeal erythema   Eyes: Conjunctivae, EOM and lids are normal  Pupils are equal, round, and reactive to light  Right eye exhibits no discharge and no exudate  Left eye exhibits no discharge and no exudate  Right conjunctiva is not injected  Right conjunctiva has no hemorrhage  Left conjunctiva is not injected  Left conjunctiva has no hemorrhage  Neck: Normal range of motion  Neck supple  Cardiovascular: Normal rate, regular rhythm and normal heart sounds  Pulmonary/Chest: Effort normal and breath sounds normal    Abdominal: Soft  Bowel sounds are normal    Skin: Skin is warm and dry  Psychiatric: She has a normal mood and affect  Nursing note and vitals reviewed  Problem List Items Addressed This Visit     None      Visit Diagnoses     Acute pharyngitis, unspecified etiology    -  Primary      consistent with viral etiology  Discussed supportive treatment  To call if with worsening or no improvement over the next several days  Rapid strep negative  Clinically not consistent with strep pharyngitis  To call our office if any concerns/questions at [phone]

## 2018-04-16 ENCOUNTER — TELEPHONE (OUTPATIENT)
Dept: FAMILY MEDICINE CLINIC | Facility: HOSPITAL | Age: 73
End: 2018-04-16

## 2018-04-16 DIAGNOSIS — J02.9 PHARYNGITIS, UNSPECIFIED ETIOLOGY: Primary | ICD-10-CM

## 2018-04-16 RX ORDER — AZITHROMYCIN 250 MG/1
TABLET, FILM COATED ORAL
Qty: 6 TABLET | Refills: 0 | Status: SHIPPED | OUTPATIENT
Start: 2018-04-16 | End: 2018-04-21

## 2018-04-16 NOTE — TELEPHONE ENCOUNTER
PATIENT WAS RECENTLY SEEN - SHE IS PROGRESSIVLY WORSE - SHE IS CONCERNED THAT THIS MIGHT BE DEVELOPING INTO SOMETHING SEVERE AND SHE IS LEAVING FOR [place] ON TUESDAY - CAN SOMETHING PLEASE BE CALLED IN FOR HER?  PLEASE ADVISE

## 2018-04-16 NOTE — TELEPHONE ENCOUNTER
Would you be able to send something into pharm for her? She saw Jermaine and is a patient of Dr Kishore Mcgovern  Neither are in the office at this time

## 2018-04-20 ENCOUNTER — TELEPHONE (OUTPATIENT)
Dept: FAMILY MEDICINE CLINIC | Facility: HOSPITAL | Age: 73
End: 2018-04-20

## 2018-04-20 NOTE — TELEPHONE ENCOUNTER
Patient called stating she is to take the last dose of zpack tonight   (med was rx by dr Sommer Davis on Monday night)  She is still having some green nasal discharge, and congestion  No fever  Congestion is less  Wants to make sure there is nothing else she should be doing  Will be leaving for [place] soon

## 2018-04-20 NOTE — TELEPHONE ENCOUNTER
Pt would like a call back at [phone] about azithromycin, they are leaving for a trip for [place] and want to make sure the med worked properly

## 2018-05-18 ENCOUNTER — OFFICE VISIT (OUTPATIENT)
Dept: FAMILY MEDICINE CLINIC | Facility: HOSPITAL | Age: 73
End: 2018-05-18
Payer: MEDICARE

## 2018-05-18 VITALS
BODY MASS INDEX: 21.44 KG/M2 | HEART RATE: 79 BPM | SYSTOLIC BLOOD PRESSURE: 122 MMHG | DIASTOLIC BLOOD PRESSURE: 78 MMHG | WEIGHT: 121 LBS | HEIGHT: 63 IN | TEMPERATURE: 97.7 F

## 2018-05-18 DIAGNOSIS — M25.511 ACUTE PAIN OF RIGHT SHOULDER: Primary | ICD-10-CM

## 2018-05-18 PROBLEM — M85.80 OSTEOPENIA: Status: ACTIVE | Noted: 2017-11-09

## 2018-05-18 PROBLEM — R09.89 BRUIT OF RIGHT CAROTID ARTERY: Status: ACTIVE | Noted: 2017-06-06

## 2018-05-18 PROBLEM — H26.9 CATARACTA: Status: ACTIVE | Noted: 2017-11-09

## 2018-05-18 PROCEDURE — 99213 OFFICE O/P EST LOW 20 MIN: CPT | Performed by: INTERNAL MEDICINE

## 2018-05-18 RX ORDER — BETAMETHASONE DIPROPIONATE 0.5 MG/G
OINTMENT TOPICAL
Refills: 1 | COMMUNITY
Start: 2018-05-15

## 2018-05-18 NOTE — PROGRESS NOTES
Assessment/Plan:     Diagnoses and all orders for this visit:    Acute pain of right shoulder  Comments:  S/p fall on outstretched arm - no s/sx of fracture or bicep tendon rupture - likely bicep strain from fall, advised gentle ROM/heat or ice and con't Advil, call if no better in 2 wks and would need imaging and poss PT or Ortho referral          Subjective:      Patient ID: Osmani Doss is a 72 y o  female  HPI Pt here for shoulder pain  She tripped on a grate and went down on her R side and landed on hand outstretched and jammed her shoulder  She has had R shoulder pain since the fall 2 wks ago or so  The pain is front of R shoulder and proximal arm  The pain does radiate to the top of the arm but not past that  The pain is intermittent and worse with reaching back behind or out to the side  She has con't to go to the gym and notes really only has pain when she does bicep curls  She notes no weakness or dropping objects  She notes no numbness/tingling  She notes h/o shoulder pain in the past with certain movements but it resolved on its own  She notes no previous R shoulder surgeries  She has tried OTC Aleve with some benefit  She notes no issues with neck or elbow  Review of Systems   Constitutional: Negative for chills and fever  Respiratory: Negative for cough and shortness of breath  Cardiovascular: Negative for chest pain and palpitations  Musculoskeletal: Positive for arthralgias  Negative for joint swelling and neck pain  Skin: Negative for wound  Neurological: Negative for weakness and numbness  Objective:    /78   Pulse 79   Temp 97 7 °F (36 5 °C)   Ht 5' 3" (1 6 m)   Wt 54 9 kg (121 lb)   BMI 21 43 kg/m²      Physical Exam   Constitutional: She appears well-developed and well-nourished  No distress  HENT:   Head: Normocephalic and atraumatic  Eyes: Conjunctivae are normal  Right eye exhibits no discharge  Left eye exhibits no discharge     Neck: Neck supple  No tracheal deviation present  Cardiovascular: Normal rate, regular rhythm and normal heart sounds  Exam reveals no friction rub  No murmur heard  Pulmonary/Chest: Effort normal and breath sounds normal  No respiratory distress  She has no wheezes  She has no rales  Musculoskeletal: She exhibits no edema  No pain with palp of spinous processes of cervical spine, no pain with palp of spine of scapula or clavicle, + pain with palp at coracoid process, no popping or clicking with PROM R shoulder, pain with abduction but full AROM in all ranges, neg drop arm test, 5/5 B/L UE muscle strength   Neurological: She is alert  She exhibits normal muscle tone  Sensation intact to light touch B/L UE   Skin: Skin is warm  No rash noted  Psychiatric: She has a normal mood and affect  Her behavior is normal    Nursing note and vitals reviewed

## 2018-08-16 ENCOUNTER — TELEPHONE (OUTPATIENT)
Dept: FAMILY MEDICINE CLINIC | Facility: HOSPITAL | Age: 73
End: 2018-08-16

## 2018-08-16 NOTE — TELEPHONE ENCOUNTER
Please notify pt that 440 mg of Naproxen bid is fine - max dose is 1500mg a day - obviously the least frequency and smallest dose is best as any anti-inflammatory can affect the kidney and blood pressure and increases the risk for heart attack and stroke, if pain not better the next step is orthopedics

## 2018-08-16 NOTE — TELEPHONE ENCOUNTER
----- Message from Brianda Rachel  Lissette Han sent at 8/16/2018 11:13 AM EDT -----  Regarding: Visit Follow-Up Question  Contact: [phone]  My low-level right shoulder pain, for which you prescribed Naproxen 440 mgm (2 tabs) BID, is still present, and the shoulder "cracks" with certain movements, but not constantly, especially with more consistent dosing  (I took it sporadically until about a month ago, when I began taking it routinely)  It does seem to be more bothersome if I miss a dose  I am just checking to be sure it is okay for me to continue the med at this level  I have returned to weight work, but decreased the intensity of each weight  Any exercises I should add?   Thanks,  Maggie Ivy

## 2018-08-28 DIAGNOSIS — N95.2 VAGINAL ATROPHY: Primary | ICD-10-CM

## 2018-08-28 RX ORDER — ESTRADIOL 10 UG/1
1 INSERT VAGINAL 2 TIMES WEEKLY
Qty: 24 TABLET | Refills: 0 | Status: SHIPPED | OUTPATIENT
Start: 2018-08-30 | End: 2018-09-18 | Stop reason: SDUPTHER

## 2018-08-29 ENCOUNTER — TELEPHONE (OUTPATIENT)
Dept: OBGYN CLINIC | Facility: CLINIC | Age: 73
End: 2018-08-29

## 2018-09-12 DIAGNOSIS — F51.01 PRIMARY INSOMNIA: Primary | ICD-10-CM

## 2018-09-12 RX ORDER — ZOLPIDEM TARTRATE 5 MG/1
TABLET ORAL
Qty: 90 TABLET | Refills: 0 | Status: SHIPPED | OUTPATIENT
Start: 2018-09-12 | End: 2021-01-19 | Stop reason: SDUPTHER

## 2018-09-18 ENCOUNTER — ANNUAL EXAM (OUTPATIENT)
Dept: OBGYN CLINIC | Facility: CLINIC | Age: 73
End: 2018-09-18
Payer: MEDICARE

## 2018-09-18 ENCOUNTER — TELEPHONE (OUTPATIENT)
Dept: OBGYN CLINIC | Facility: CLINIC | Age: 73
End: 2018-09-18

## 2018-09-18 VITALS
DIASTOLIC BLOOD PRESSURE: 82 MMHG | SYSTOLIC BLOOD PRESSURE: 130 MMHG | HEIGHT: 63 IN | BODY MASS INDEX: 21.09 KG/M2 | WEIGHT: 119 LBS

## 2018-09-18 DIAGNOSIS — N95.2 VAGINAL ATROPHY: Primary | ICD-10-CM

## 2018-09-18 DIAGNOSIS — Z12.31 ENCOUNTER FOR SCREENING MAMMOGRAM FOR BREAST CANCER: ICD-10-CM

## 2018-09-18 PROCEDURE — 99213 OFFICE O/P EST LOW 20 MIN: CPT | Performed by: OBSTETRICS & GYNECOLOGY

## 2018-09-18 RX ORDER — ESTRADIOL 10 UG/1
1 INSERT VAGINAL 2 TIMES WEEKLY
Qty: 24 TABLET | Refills: 3 | Status: SHIPPED | OUTPATIENT
Start: 2018-09-20 | End: 2020-01-13 | Stop reason: SDUPTHER

## 2018-09-18 NOTE — PROGRESS NOTES
Assessment/Plan:    Vaginal atrophy-she will continue with Vagifem them and prescription was sent to her pharmacy    Reviewed her mammogram with her and prescription given for next year, discussed self-breast exam    Mild osteopenia-discussed exercise and adequate amounts of calcium and D and she was given information on this  Colonoscopy is up to date, she goes every 5 years due to family history  Currently, her brother is being treated for rectal cancer  No problem-specific Assessment & Plan notes found for this encounter  Diagnoses and all orders for this visit:    Vaginal atrophy  -     estradiol (VAGIFEM) 10 MCG TABS vaginal tablet; Insert 1 tablet (10 mcg total) into the vagina 2 (two) times a week    Encounter for screening mammogram for breast cancer  -     Mammo screening bilateral w 3d & cad; Future          Subjective:      Patient ID: Jose Cruz Todd is a 72 y o  female  Patient here for renewal of her Vagifem  She is doing well with this and feels that it works for her  She would like to continue  She is back on a yearly screening mammogram after having several follow-ups  She is due at the end of October  Her DEXA last year showed mild osteopenia  She is fairly active and takes calcium and vitamin-D  The following portions of the patient's history were reviewed and updated as appropriate: allergies, current medications, past family history, past medical history, past social history, past surgical history and problem list     Review of Systems   Constitutional: Negative  HENT: Negative  Eyes: Negative  Respiratory: Negative  Cardiovascular: Negative  Gastrointestinal: Negative  Endocrine: Negative  Genitourinary: Negative  Musculoskeletal: Negative  Skin: Negative  Allergic/Immunologic: Negative  Neurological: Negative  Hematological: Negative  Psychiatric/Behavioral: Negative            Objective:      /82 (BP Location: Left arm, Patient Position: Sitting, Cuff Size: Adult)   Ht 5' 3" (1 6 m)   Wt 54 kg (119 lb)   LMP  (LMP Unknown)   Breastfeeding? No   BMI 21 08 kg/m²          Physical Exam   Constitutional: She appears well-developed  Neck: No tracheal deviation present  No thyromegaly present  Cardiovascular: Normal rate and regular rhythm  Pulmonary/Chest: Effort normal and breath sounds normal  Right breast exhibits no inverted nipple, no mass, no nipple discharge, no skin change and no tenderness  Left breast exhibits no inverted nipple, no mass, no nipple discharge, no skin change and no tenderness  Breasts are symmetrical    Examined seated and supine   Abdominal: Soft  She exhibits no distension and no mass  There is no tenderness  Genitourinary: Rectum normal, vagina normal and uterus normal  No labial fusion  There is no rash, tenderness, lesion or injury on the right labia  There is no rash, tenderness, lesion or injury on the left labia  Cervix exhibits no motion tenderness, no discharge and no friability  Right adnexum displays no mass, no tenderness and no fullness  Left adnexum displays no mass, no tenderness and no fullness  Genitourinary Comments: Vaginal atrophy   Vitals reviewed

## 2018-11-12 ENCOUNTER — OFFICE VISIT (OUTPATIENT)
Dept: FAMILY MEDICINE CLINIC | Facility: HOSPITAL | Age: 73
End: 2018-11-12
Payer: MEDICARE

## 2018-11-12 VITALS
BODY MASS INDEX: 21.62 KG/M2 | WEIGHT: 122 LBS | HEART RATE: 93 BPM | TEMPERATURE: 98.8 F | DIASTOLIC BLOOD PRESSURE: 78 MMHG | HEIGHT: 63 IN | SYSTOLIC BLOOD PRESSURE: 128 MMHG

## 2018-11-12 DIAGNOSIS — Z23 NEED FOR INFLUENZA VACCINATION: ICD-10-CM

## 2018-11-12 DIAGNOSIS — Z00.00 MEDICARE ANNUAL WELLNESS VISIT, SUBSEQUENT: Primary | ICD-10-CM

## 2018-11-12 PROBLEM — R55 VASOVAGAL SYNCOPE: Status: RESOLVED | Noted: 2018-11-12 | Resolved: 2018-11-12

## 2018-11-12 PROBLEM — R09.89 BRUIT OF RIGHT CAROTID ARTERY: Status: RESOLVED | Noted: 2017-06-06 | Resolved: 2018-11-12

## 2018-11-12 PROBLEM — G61.0 GUILLAIN-BARRE SYNDROME (HCC): Status: RESOLVED | Noted: 2018-11-12 | Resolved: 2018-11-12

## 2018-11-12 PROBLEM — C44.92 SQUAMOUS CELL SKIN CANCER: Status: ACTIVE | Noted: 2018-11-12

## 2018-11-12 PROBLEM — G56.40 CAUSALGIA OF UPPER LIMB: Status: RESOLVED | Noted: 2018-11-12 | Resolved: 2018-11-12

## 2018-11-12 PROCEDURE — G0008 ADMIN INFLUENZA VIRUS VAC: HCPCS

## 2018-11-12 PROCEDURE — 90662 IIV NO PRSV INCREASED AG IM: CPT

## 2018-11-12 PROCEDURE — G0439 PPPS, SUBSEQ VISIT: HCPCS | Performed by: INTERNAL MEDICINE

## 2018-11-12 NOTE — PATIENT INSTRUCTIONS
Obesity   AMBULATORY CARE:   Obesity  is when your body mass index (BMI) is greater than 30  Your healthcare provider will use your height and weight to measure your BMI  The risks of obesity include  many health problems, such as injuries or physical disability  You may need tests to check for the following:  · Diabetes     · High blood pressure or high cholesterol     · Heart disease     · Gallbladder or liver disease     · Cancer of the colon, breast, prostate, liver, or kidney     · Sleep apnea     · Arthritis or gout  Seek care immediately if:   · You have a severe headache, confusion, or difficulty speaking  · You have weakness on one side of your body  · You have chest pain, sweating, or shortness of breath  Contact your healthcare provider if:   · You have symptoms of gallbladder or liver disease, such as pain in your upper abdomen  · You have knee or hip pain and discomfort while walking  · You have symptoms of diabetes, such as intense hunger and thirst, and frequent urination  · You have symptoms of sleep apnea, such as snoring or daytime sleepiness  · You have questions or concerns about your condition or care  Treatment for obesity  focuses on helping you lose weight to improve your health  Even a small decrease in BMI can reduce the risk for many health problems  Your healthcare provider will help you set a weight-loss goal   · Lifestyle changes  are the first step in treating obesity  These include making healthy food choices and getting regular physical activity  Your healthcare provider may suggest a weight-loss program that involves coaching, education, and therapy  · Medicine  may help you lose weight when it is used with a healthy diet and physical activity  · Surgery  can help you lose weight if you are very obese and have other health problems  There are several types of weight-loss surgery  Ask your healthcare provider for more information    Be successful losing weight:   · Set small, realistic goals  An example of a small goal is to walk for 20 minutes 5 days a week  Anther goal is to lose 5% of your body weight  · Tell friends, family members, and coworkers about your goals  and ask for their support  Ask a friend to lose weight with you, or join a weight-loss support group  · Identify foods or triggers that may cause you to overeat , and find ways to avoid them  Remove tempting high-calorie foods from your home and workplace  Place a bowl of fresh fruit on your kitchen counter  If stress causes you to eat, then find other ways to cope with stress  · Keep a diary to track what you eat and drink  Also write down how many minutes of physical activity you do each day  Weigh yourself once a week and record it in your diary  Eating changes: You will need to eat 500 to 1,000 fewer calories each day than you currently eat to lose 1 to 2 pounds a week  The following changes will help you cut calories:  · Eat smaller portions  Use small plates, no larger than 9 inches in diameter  Fill your plate half full of fruits and vegetables  Measure your food using measuring cups until you know what a serving size looks like  · Eat 3 meals and 1 or 2 snacks each day  Plan your meals in advance  Ana Herita and eat at home most of the time  Eat slowly  · Eat fruits and vegetables at every meal   They are low in calories and high in fiber, which makes you feel full  Do not add butter, margarine, or cream sauce to vegetables  Use herbs to season steamed vegetables  · Eat less fat and fewer fried foods  Eat more baked or grilled chicken and fish  These protein sources are lower in calories and fat than red meat  Limit fast food  Dress your salads with olive oil and vinegar instead of bottled dressing  · Limit the amount of sugar you eat  Do not drink sugary beverages  Limit alcohol  Activity changes:  Physical activity is good for your body in many ways   It helps you burn calories and build strong muscles  It decreases stress and depression, and improves your mood  It can also help you sleep better  Talk to your healthcare provider before you begin an exercise program   · Exercise for at least 30 minutes 5 days a week  Start slowly  Set aside time each day for physical activity that you enjoy and that is convenient for you  It is best to do both weight training and an activity that increases your heart rate, such as walking, bicycling, or swimming  · Find ways to be more active  Do yard work and housecleaning  Walk up the stairs instead of using elevators  Spend your leisure time going to events that require walking, such as outdoor festivals or fairs  This extra physical activity can help you lose weight and keep it off  Follow up with your healthcare provider as directed: You may need to meet with a dietitian  Write down your questions so you remember to ask them during your visits  © 2017 [employer] Information is for End User's use only and may not be sold, redistributed or otherwise used for commercial purposes  All illustrations and images included in CareNotes® are the copyrighted property of [employer]  or Abel Gorman  The above information is an  only  It is not intended as medical advice for individual conditions or treatments  Talk to your doctor, nurse or pharmacist before following any medical regimen to see if it is safe and effective for you  Urinary Incontinence   WHAT YOU NEED TO KNOW:   What is urinary incontinence? Urinary incontinence (UI) is when you lose control of your bladder  What causes UI? UI occurs because your bladder cannot store or empty urine properly  The following are the most common types of UI:  · Stress incontinence  is when you leak urine due to increased bladder pressure  This may happen when you cough, sneeze, or exercise       · Urge incontinence  is when you feel the need to urinate right away and leak urine accidentally  · Mixed incontinence  is when you have both stress and urge UI  What are the signs and symptoms of UI?   · You feel like your bladder does not empty completely when you urinate  · You urinate often and need to urinate immediately  · You leak urine when you sleep, or you wake up with the urge to urinate  · You leak urine when you cough, sneeze, exercise, or laugh  How is UI diagnosed? Your healthcare provider will ask how often you leak urine and whether you have stress or urge symptoms  Tell him which medicines you take, how often you urinate, and how much liquid you drink each day  You may need any of the following tests:  · Urine tests  may show infection or kidney function  · A pelvic exam  may be done to check for blockages  A pelvic exam will also show if your bladder, uterus, or other organs have moved out of place  · An x-ray, ultrasound, or CT  may show problems with parts of your urinary system  You may be given contrast liquid to help your organs show up better in the pictures  Tell the healthcare provider if you have ever had an allergic reaction to contrast liquid  Do not enter the MRI room with anything metal  Metal can cause serious injury  Tell the healthcare provider if you have any metal in or on your body  · A bladder scan  will show how much urine is left in your bladder after you urinate  You will be asked to urinate and then healthcare providers will use a small ultrasound machine to check the urine left in your bladder  · Cystometry  is used to check the function of your urinary system  Your healthcare provider checks the pressure in your bladder while filling it with fluid  Your bladder pressure may also be tested when your bladder is full and while you urinate  How is UI treated? · Medicines  can help strengthen your bladder control      · Electrical stimulation  is used to send a small amount of electrical energy to your pelvic floor muscles  This helps control your bladder function  Electrodes may be placed outside your body or in your rectum  For women, the electrodes may be placed in the vagina  · A bulking agent  may be injected into the wall of your urethra to make it thicker  This helps keep your urethra closed and decreases urine leakage  · Devices  such as a clamp, pessary, or tampon may help stop urine leaks  Ask your healthcare provider for more information about these and other devices  · Surgery  may be needed if other treatments do not work  Several types of surgery can help improve your bladder control  Ask your healthcare provider for more information about the surgery you may need  How can I manage my symptoms? · Do pelvic muscle exercises often  Your pelvic muscles help you stop urinating  Squeeze these muscles tight for 5 seconds, then relax for 5 seconds  Gradually work up to squeezing for 10 seconds  Do 3 sets of 15 repetitions a day, or as directed  This will help strengthen your pelvic muscles and improve bladder control  · A catheter  may be used to help empty your bladder  A catheter is a tiny, plastic tube that is put into your bladder to drain your urine  Your healthcare provider may tell you to use a catheter to prevent your bladder from getting too full and leaking urine  · Keep a UI record  Write down how often you leak urine and how much you leak  Make a note of what you were doing when you leaked urine  · Train your bladder  Go to the bathroom at set times, such as every 2 hours, even if you do not feel the urge to go  You can also try to hold your urine when you feel the urge to go  For example, hold your urine for 5 minutes when you feel the urge to go  As that becomes easier, hold your urine for 10 minutes  · Drink liquids as directed  Ask your healthcare provider how much liquid to drink each day and which liquids are best for you   You may need to limit the amount of liquid you drink to help control your urine leakage  Limit or do not have drinks that contain caffeine or alcohol  Do not drink any liquid right before you go to bed  · Prevent constipation  Eat a variety of high-fiber foods  Good examples are high-fiber cereals, beans, vegetables, and whole-grain breads  Prune juice may help make your bowel movement softer  Walking is the best way to trigger your intestines to have a bowel movement  · Exercise regularly and maintain a healthy weight  Ask your healthcare provider how much you should weigh and about the best exercise plan for you  Weight loss and exercise will decrease pressure on your bladder and help you control your leakage  Ask him to help you create a weight loss plan if you are overweight  When should I seek immediate care? · You have severe pain  · You are confused or cannot think clearly  When should I contact my healthcare provider? · You have a fever  · You see blood in your urine  · You have pain when you urinate  · You have new or worse pain, even after treatment  · Your mouth feels dry or you have vision changes  · Your urine is cloudy or smells bad  · You have questions or concerns about your condition or care  CARE AGREEMENT:   You have the right to help plan your care  Learn about your health condition and how it may be treated  Discuss treatment options with your caregivers to decide what care you want to receive  You always have the right to refuse treatment  The above information is an  only  It is not intended as medical advice for individual conditions or treatments  Talk to your doctor, nurse or pharmacist before following any medical regimen to see if it is safe and effective for you  © 2017 [employer] Information is for End User's use only and may not be sold, redistributed or otherwise used for commercial purposes   All illustrations and images included in CareNotes® are the copyrighted property of [employer]  or Abel Gorman  Cigarette Smoking and Your Health   AMBULATORY CARE:   Risks to your health if you smoke:  Nicotine and other chemicals found in tobacco damage every cell in your body  Even if you are a light smoker, you have an increased risk for cancer, heart disease, and lung disease  If you are pregnant or have diabetes, smoking increases your risk for complications  Benefits to your health if you stop smoking:   · You decrease respiratory symptoms such as coughing, wheezing, and shortness of breath  · You reduce your risk for cancers of the lung, mouth, throat, kidney, bladder, pancreas, stomach, and cervix  If you already have cancer, you increase the benefits of chemotherapy  You also reduce your risk for cancer returning or a second cancer from developing  · You reduce your risk for heart disease, blood clots, heart attack, and stroke  · You reduce your risk for lung infections, and diseases such as pneumonia, asthma, chronic bronchitis, and emphysema  · Your circulation improves  More oxygen can be delivered to your body  If you have diabetes, you lower your risk for complications, such as kidney, artery, and eye diseases  You also lower your risk for nerve damage  Nerve damage can lead to amputations, poor vision, and blindness  · You improve your body's ability to heal and to fight infections  Benefits to the health of others if you stop smoking:  Tobacco is harmful to nonsmokers who breathe in your secondhand smoke  The following are ways the health of others around you may improve when you stop smoking:  · You lower the risks for lung cancer and heart disease in nonsmoking adults  · If you are pregnant, you lower the risk for miscarriage, early delivery, low birth weight, and stillbirth  You also lower your baby's risk for SIDS, obesity, developmental delay, and neurobehavioral problems, such as ADHD  · If you have children, you lower their risk for ear infections, colds, pneumonia, bronchitis, and asthma  For more information and support to stop smoking:   · Smokefree  gov  Phone: 2- 344 - 111-2302  Web Address: www smokefree  gov  Follow up with your healthcare provider as directed:  Write down your questions so you remember to ask them during your visits  © 2017 [employer] Information is for End User's use only and may not be sold, redistributed or otherwise used for commercial purposes  All illustrations and images included in CareNotes® are the copyrighted property of [employer]  or Abel Gorman  The above information is an  only  It is not intended as medical advice for individual conditions or treatments  Talk to your doctor, nurse or pharmacist before following any medical regimen to see if it is safe and effective for you  Fall Prevention   AMBULATORY CARE:   Fall prevention  includes ways to make your home and other areas safer  It also includes ways you can move more carefully to prevent a fall  Health conditions that cause changes in your blood pressure, vision, or muscle strength and coordination may increase your risk for falls  Medicines may also increase your risk for falls if they make you dizzy, weak, or sleepy  Call 911 or have someone else call if:   · You have fallen and are unconscious  · You have fallen and cannot move part of your body  Contact your healthcare provider if:   · You have fallen and have pain or a headache  · You have questions or concerns about your condition or care  Fall prevention tips:   · Stand or sit up slowly  This may help you keep your balance and prevent falls  · Use assistive devices as directed  Your healthcare provider may suggest that you use a cane or walker to help you keep your balance  You may need to have grab bars put in your bathroom near the toilet or in the shower      · Wear shoes that fit well and have soles that   Wear shoes both inside and outside  Use slippers with good   Do not wear shoes with high heels  · Wear a personal alarm  This is a device that allows you to call 911 if you fall and need help  Ask your healthcare provider for more information  · Stay active  Exercise can help strengthen your muscles and improve your balance  Your healthcare provider may recommend water aerobics or walking  He or she may also recommend physical therapy to improve your coordination  Never start an exercise program without talking to your healthcare provider first      · Manage your medical conditions  Keep all appointments with your healthcare providers  Visit your eye doctor as directed  Home safety tips:   · Add items to prevent falls in the bathroom  Put nonslip strips on your bath or shower floor to prevent you from slipping  Use a bath mat if you do not have carpet in the bathroom  This will prevent you from falling when you step out of the bath or shower  Use a shower seat so you do not need to stand while you shower  Sit on the toilet or a chair in your bathroom to dry yourself and put on clothing  This will prevent you from losing your balance from drying or dressing yourself while you are standing  · Keep paths clear  Remove books, shoes, and other objects from walkways and stairs  Place cords for telephones and lamps out of the way so that you do not need to walk over them  Tape them down if you cannot move them  Remove small rugs  If you cannot remove a rug, secure it with double-sided tape  This will prevent you from tripping  · Install bright lights in your home  Use night lights to help light paths to the bathroom or kitchen  Always turn on the light before you start walking  · Keep items you use often on shelves within reach  Do not use a step stool to help you reach an item  · Paint or place reflective tape on the edges of your stairs    This will help you see the stairs better  Follow up with your healthcare provider as directed:  Write down your questions so you remember to ask them during your visits  © 2017 [employer] [employer] Information is for End User's use only and may not be sold, redistributed or otherwise used for commercial purposes  All illustrations and images included in CareNotes® are the copyrighted property of [employer]  or Abel Gorman  The above information is an  only  It is not intended as medical advice for individual conditions or treatments  Talk to your doctor, nurse or pharmacist before following any medical regimen to see if it is safe and effective for you  Advance Directives   WHAT YOU NEED TO KNOW:   What are advance directives? Advance directives are legal documents that state your wishes and plans for medical care  These plans are made ahead of time in case you lose your ability to make decisions for yourself  Advance directives can apply to any medical decision, such as the treatments you want, and if you want to donate organs  What are the types of advance directives? There are many types of advance directives, and each state has rules about how to use them  You may choose a combination of any of the following:  · Living will: This is a written record of the treatment you want  You can also choose which treatments you do not want, which to limit, and which to stop at a certain time  This includes surgery, medicine, IV fluid, and tube feedings  · Durable power of  for healthcare [facility]): This is a written record that states who you want to make healthcare choices for you when you are unable to make them for yourself  This person, called a proxy, is usually a family member or a friend  You may choose more than 1 proxy  · Do not resuscitate (DNR) order:  A DNR order is used in case your heart stops beating or you stop breathing   It is a request not to have certain forms of treatment, such as CPR  A DNR order may be included in other types of advance directives  · Medical directive: This covers the care that you want if you are in a coma, near death, or unable to make decisions for yourself  You can list the treatments you want for each condition  Treatment may include pain medicine, surgery, blood transfusions, dialysis, IV or tube feedings, and a ventilator (breathing machine)  · Values history: This document has questions about your views, beliefs, and how you feel and think about life  This information can help others choose the care that you would choose  Why are advance directives important? An advance directive helps you control your care  Although spoken wishes may be used, it is better to have your wishes written down  Spoken wishes can be misunderstood, or not followed  Treatments may be given even if you do not want them  An advance directive may make it easier for your family to make difficult choices about your care  How do I decide what to put in my advance directives? · Make informed decisions:  Make sure you fully understand treatments or care you may receive  Think about the benefits and problems your decisions could cause for you or your family  Talk to healthcare providers if you have concerns or questions before you write down your wishes  You may also want to talk with your [employer] or , or a   Check your state laws to make sure that what you put in your advance directive is legal      · Sign all forms:  Sign and date your advance directive when you have finished  You may also need 2 witnesses to sign the forms  Witnesses cannot be your doctor or his staff, your spouse, heirs or beneficiaries, people you owe money to, or your chosen proxy  Talk to your family, proxy, and healthcare providers about your advance directive  Give each person a copy, and keep one for yourself in a place you can get to easily   Do not keep it hidden or locked away  · Review and revise your plans: You can revise your advance directive at any time, as long as you are able to make decisions  Review your plan every year, and when there are changes in your life, or your health  When you make changes, let your family, proxy, and healthcare providers know  Give each a new copy  Where can I find more information? · [employer]  [address] , [place]  Phone: 9- [phone] - [phone]  Phone: 9- [phone] - [phone]  Web Address: http://www  aafp org  · [address] [facility])  [address] [address] [facility], [address]  Phone: 1- 115 - [phone]  Phone: [phone]  Web Address: Diana pandya  CARE AGREEMENT:   You have the right to help plan your care  To help with this plan, you must learn about your health condition and treatment options  You must also learn about advance directives and how they are used  Work with your healthcare providers to decide what care will be used to treat you  You always have the right to refuse treatment  The above information is an  only  It is not intended as medical advice for individual conditions or treatments  Talk to your doctor, nurse or pharmacist before following any medical regimen to see if it is safe and effective for you  © 2017 [employer] Information is for End User's use only and may not be sold, redistributed or otherwise used for commercial purposes  All illustrations and images included in CareNotes® are the copyrighted property of [employer]  or Abel Gorman

## 2018-11-12 NOTE — PROGRESS NOTES
Assessment and Plan:  Problem List Items Addressed This Visit     None      Visit Diagnoses     Medicare annual wellness visit, subsequent    -  Primary    Need for influenza vaccination        Relevant Orders    influenza vaccine, [id number], high-dose, PF 0 5 mL, for patients 65 yr+ (FLUZONE HIGH-DOSE) (Completed)      urged to do mammogram - has order from GYN at home    Health Maintenance Due   Topic Date Due    Depression Screening PHQ  1945    DTaP,Tdap,and Td Vaccines (1 - Tdap) 01/03/1968    Fall Risk  01/03/2012    Urinary Incontinence Screening  01/03/2012    Pneumococcal PPSV23/PCV13 65+ Years / Low and Medium Risk (1 of 2 - PCV13) 01/03/2012    INFLUENZA VACCINE  07/01/2018         HPI: pt is agreeable to flu vaccine  Patient Active Problem List   Diagnosis    Atrophy of vagina    Cataracta    Osteopenia    Macrocytosis    Insomnia    Glaucoma    Squamous cell skin cancer     Past Medical History:   Diagnosis Date    Breast asymmetry     Resolved: Nov 9, 2017    Causalgia of upper limb     last assessed: Jan 29, 2014    Elevated AST (SGOT)     Last assessed: Mike 3, 2014    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[facility])     Varicose veins with pain, unspecified laterality     Resolved: June 6, 2017    Vasovagal syncope     last assessed: Dec 30, 2013     Past Surgical History:   Procedure Laterality Date    CATARACT EXTRACTION, BILATERAL Bilateral 2018 january and april    COLONOSCOPY      Complete - [place] 2/13/13 - Hemorrhoids, repeat 5 years d/t family h/o colon cancer     FOOT SURGERY      MOUTH SURGERY      Tooth Extraction - last assessed: Aug 19, 2014   Yosi Lamprey MOUTH SURGERY N/A 11/2017    SKIN BIOPSY Right 07/2018    right thigh    TONSILLECTOMY      last assessed: Aug 19, 2014     Family History   Problem Relation Age of Onset    Uterine cancer Mother     Colon cancer Mother     Lung cancer Father     Colon cancer Brother      History   Smoking Status    Never Smoker   Smokeless Tobacco    Never Used     History   Alcohol Use    Yes     Comment: 1 per day - social/ drinks wine       History   Drug Use No         Current Outpatient Prescriptions   Medication Sig Dispense Refill    betamethasone, augmented, (DIPROLENE) 0 05 % ointment APPLY TO RASH ON UPPER/ LOWER EXTREMITIES TWICE DAILY X10-14 DAYS (DO NOT USE ON FACE OR BODY FOLDS)  1    Calcium Carbonate-Vitamin D3 (CALCIUM 600/VITAMIN D) 600-400 MG-UNIT TABS Take 1 tablet by mouth daily      estradiol (VAGIFEM) 10 MCG TABS vaginal tablet Insert 1 tablet (10 mcg total) into the vagina 2 (two) times a week 24 tablet 3    zolpidem (AMBIEN) 5 mg tablet Take   5-1 tab as needed for sleep 90 tablet 0     No current facility-administered medications for this visit  No Known Allergies  Immunization History   Administered Date(s) Administered    H1N1, All Formulations 11/16/2009    Influenza 11/17/2017    Influenza Split High Dose Preservative Free IM 11/17/2017    Influenza, high dose seasonal 0 5 mL 11/12/2018       Patient Care Team:  Svetlana Foley DO as PCP - Hua Canas DO Dr Jestine Fang - Derm    Medicare Screening Tests and Risk Assessments:  Pj Yañez is here for her Subsequent Wellness visit  Last Medicare Wellness visit information reviewed, patient interviewed and updates made to the record today  Health Risk Assessment:  Patient rates overall health as excellent  Patient feels that their physical health rating is Same  Eyesight was rated as Same  Hearing was rated as Same  Patient feels that their emotional and mental health rating is Same  Pain experienced by patient in the last 7 days has been None  Patient states that she has experienced no weight loss or gain in last 6 months  Emotional/Mental Health:  Patient has been feeling nervous/anxious  PHQ-9 Depression Screening:    Frequency of the following problems over the past two weeks:      1   Little interest or pleasure in doing things: 0 - not at all      2  Feeling down, depressed, or hopeless: 0 - not at all  PHQ-2 Score: 0          Broken Bones/Falls: Fall Risk Assessment:    In the past year, patient has experienced: No history of falling in past year  visual disturbance    Bladder/Bowel:  Patient has not leaked urine accidently in the last six months  Patient reports no loss of bowel control  Immunizations:  Patient has not had a flu vaccination within the last year  Patient has not received a pneumonia shot  Patient has not received a shingles shot  Patient has not received tetanus/diphtheria shot  Home Safety:  Patient does not have trouble with stairs inside or outside of their home  Patient currently reports that there are no safety hazards present in home, working smoke alarms, working carbon monoxide detectors  Preventative Screenings:   Breast cancer screening performed, 10/25/2017  colon cancer screen completed, 1/22/2018  cholesterol screen completed, 6/8/2016  glaucoma eye exam completed, 11/29/2017  (Additional Comments: Dr Gina Saeed for regular annual eye exams)    Nutrition:  Current diet: Limited junk food, Regular, Low Cholesterol, Low Saturated Fat, Low Carb and No Added Salt with servings of the following:    Medications:  Patient is currently taking over-the-counter supplements  List of OTC medications includes: calcium and Vit D3  Patient is able to manage medications  Lifestyle Choices:  Patient reports no tobacco use  Patient has not smoked or used tobacco in the past   Patient reports alcohol use  Alcohol use per week: 7 glasses of wine a week  Patient drives a vehicle  Patient wears seat belt  Current level of exercise of physical activity described by patient as: twice weekly Cardiac rehab at [facility] - does weights at rehab and walks regularly at home          Activities of Daily Living:  Can get out of bed by his or her self, able to dress self, able to make own meals, able to do own shopping, able to bathe self, can do own laundry/housekeeping, can manage own money, pay bills and track expenses    Previous Hospitalizations:  Hospitalization or ED visit in past 12 months        Advanced Directives:  Patient has decided on a power of   Patient has spoken to designated power of   Patient has completed advanced directive  Preventative Screening/Counseling:      Cardiovascular:      General: Risks and Benefits Discussed and Screening Current      Counseling: Healthy Diet, Healthy Weight and Improve Exercise Tolerance          Diabetes:      General: Risks and Benefits Discussed and Screening Current          Colorectal Cancer:      General: Risks and Benefits Discussed          Breast Cancer:      General: Risks and Benefits Discussed      Comments: Pt has order at home from GYN and will call and schedule        Cervical Cancer:      General: Screening Not Indicated          Osteoporosis:      General: Risks and Benefits Discussed and Screening Current          AAA:      General: Screening Not Indicated and Risks and Benefits Discussed          Glaucoma:      General: Screening Current and Risks and Benefits Discussed          HIV:      General: Screening Not Indicated and Risks and Benefits Discussed          Hepatitis C:      General: Risks and Benefits Discussed and Screening Not Indicated        Advanced Directives:   Patient has living will for healthcare, has durable POA for healthcare,     Immunizations:      Influenza: Risks & Benefits Discussed and Influenza Due Today      Pneumococcal: Risks & Benefits Discussed      Shingrix: Shingrix Vaccine Needed Today and Risks & Benefits Discussed      Hepatitis B (Medium to high risk patients): Vaccine Status Unknown      Zostavax: Patient Declines      TD: Patient Declines      Other Preventative Counseling (Non-Medicare):   Increase physical activity, Car/seat belt/driving safety reviewed and Sunscreen use          No exam data present    Physical Exam:  Review of Systems   Constitutional: Negative for chills, fatigue, fever and unexpected weight change  HENT: Negative for congestion and sore throat  Eyes: Negative for pain and visual disturbance  Respiratory: Negative for cough, shortness of breath and wheezing  Cardiovascular: Negative for chest pain, palpitations and leg swelling  Gastrointestinal: Negative for abdominal pain, blood in stool, bowel incontinence, constipation, diarrhea and nausea  Endocrine: Negative for polydipsia and polyuria  Genitourinary: Negative for difficulty urinating and dysuria  Musculoskeletal: Negative for back pain and neck pain  Skin: Negative for rash and wound  Neurological: Negative for dizziness, syncope, light-headedness and headaches  Hematological: Negative for adenopathy  Psychiatric/Behavioral: Negative for behavioral problems and confusion  The patient is not nervous/anxious  Vitals:    11/12/18 1029   BP: 128/78   Pulse: 93   Temp: 98 8 °F (37 1 °C)   Weight: 55 3 kg (122 lb)   Height: 5' 3" (1 6 m)   Body mass index is 21 61 kg/m²  Physical Exam   Constitutional: She appears well-developed and well-nourished  No distress  HENT:   Head: Atraumatic  Right Ear: External ear normal    Left Ear: External ear normal    Mouth/Throat: Oropharynx is clear and moist    Eyes: Conjunctivae are normal  Right eye exhibits no discharge  Left eye exhibits no discharge  Neck: Neck supple  No tracheal deviation present  No thyromegaly present  Cardiovascular: Normal rate, regular rhythm and normal heart sounds  No murmur heard  Neg carotid bruits B/L   Pulmonary/Chest: Effort normal and breath sounds normal  No respiratory distress  She has no wheezes  She has no rales  Abdominal: Soft  There is no tenderness  There is no guarding  Musculoskeletal: She exhibits no edema or deformity     Lymphadenopathy:     She has no cervical adenopathy  Neurological: She is alert  She exhibits normal muscle tone  Skin: Skin is warm and dry  No rash noted  Psychiatric: She has a normal mood and affect  Her behavior is normal  Judgment normal    Nursing note and vitals reviewed

## 2018-11-21 ENCOUNTER — HOSPITAL ENCOUNTER (OUTPATIENT)
Dept: MAMMOGRAPHY | Facility: CLINIC | Age: 73
Discharge: HOME | End: 2018-11-21
Payer: MEDICARE

## 2018-11-21 VITALS — WEIGHT: 118 LBS | BODY MASS INDEX: 20.91 KG/M2 | HEIGHT: 63 IN

## 2018-11-21 DIAGNOSIS — Z12.31 ENCOUNTER FOR SCREENING MAMMOGRAM FOR BREAST CANCER: ICD-10-CM

## 2018-11-21 PROCEDURE — 77067 SCR MAMMO BI INCL CAD: CPT

## 2018-11-21 PROCEDURE — 77063 BREAST TOMOSYNTHESIS BI: CPT

## 2019-05-15 ENCOUNTER — TELEPHONE (OUTPATIENT)
Dept: FAMILY MEDICINE CLINIC | Facility: HOSPITAL | Age: 74
End: 2019-05-15

## 2019-09-25 ENCOUNTER — TELEPHONE (OUTPATIENT)
Dept: OBGYN CLINIC | Facility: CLINIC | Age: 74
End: 2019-09-25

## 2019-10-30 DIAGNOSIS — Z12.39 SCREENING FOR MALIGNANT NEOPLASM OF BREAST: Primary | ICD-10-CM

## 2019-11-06 DIAGNOSIS — Z12.31 VISIT FOR SCREENING MAMMOGRAM: Primary | ICD-10-CM

## 2019-11-20 ENCOUNTER — IMMUNIZATIONS (OUTPATIENT)
Dept: FAMILY MEDICINE CLINIC | Facility: HOSPITAL | Age: 74
End: 2019-11-20
Payer: MEDICARE

## 2019-11-20 DIAGNOSIS — Z23 ENCOUNTER FOR IMMUNIZATION: ICD-10-CM

## 2019-11-20 PROCEDURE — G0008 ADMIN INFLUENZA VIRUS VAC: HCPCS | Performed by: FAMILY MEDICINE

## 2019-11-20 PROCEDURE — 90662 IIV NO PRSV INCREASED AG IM: CPT | Performed by: FAMILY MEDICINE

## 2019-12-17 ENCOUNTER — OFFICE VISIT (OUTPATIENT)
Dept: FAMILY MEDICINE CLINIC | Facility: HOSPITAL | Age: 74
End: 2019-12-17
Payer: MEDICARE

## 2019-12-17 VITALS
TEMPERATURE: 97 F | DIASTOLIC BLOOD PRESSURE: 72 MMHG | BODY MASS INDEX: 21.09 KG/M2 | HEART RATE: 100 BPM | WEIGHT: 119 LBS | HEIGHT: 63 IN | SYSTOLIC BLOOD PRESSURE: 118 MMHG

## 2019-12-17 DIAGNOSIS — Z13.29 SCREENING FOR THYROID DISORDER: ICD-10-CM

## 2019-12-17 DIAGNOSIS — Z13.0 SCREENING FOR ENDOCRINE, METABOLIC AND IMMUNITY DISORDER: ICD-10-CM

## 2019-12-17 DIAGNOSIS — Z13.29 SCREENING FOR ENDOCRINE, METABOLIC AND IMMUNITY DISORDER: ICD-10-CM

## 2019-12-17 DIAGNOSIS — Z23 NEED FOR PNEUMOCOCCAL VACCINATION: ICD-10-CM

## 2019-12-17 DIAGNOSIS — D75.89 MACROCYTOSIS: ICD-10-CM

## 2019-12-17 DIAGNOSIS — Z11.59 NEED FOR HEPATITIS C SCREENING TEST: ICD-10-CM

## 2019-12-17 DIAGNOSIS — Z13.6 SCREENING FOR CARDIOVASCULAR CONDITION: ICD-10-CM

## 2019-12-17 DIAGNOSIS — Z13.228 SCREENING FOR ENDOCRINE, METABOLIC AND IMMUNITY DISORDER: ICD-10-CM

## 2019-12-17 DIAGNOSIS — Z00.00 MEDICARE ANNUAL WELLNESS VISIT, SUBSEQUENT: Primary | ICD-10-CM

## 2019-12-17 PROCEDURE — G0009 ADMIN PNEUMOCOCCAL VACCINE: HCPCS | Performed by: INTERNAL MEDICINE

## 2019-12-17 PROCEDURE — 90670 PCV13 VACCINE IM: CPT | Performed by: INTERNAL MEDICINE

## 2019-12-17 PROCEDURE — G0438 PPPS, INITIAL VISIT: HCPCS | Performed by: INTERNAL MEDICINE

## 2019-12-17 NOTE — PROGRESS NOTES
Assessment and Plan:     Problem List Items Addressed This Visit        Other    Macrocytosis - Primary    Relevant Orders    CBC and differential      Other Visit Diagnoses     Medicare annual wellness visit, subsequent        Screening for cardiovascular condition        Relevant Orders    Lipid panel    Need for hepatitis C screening test        Relevant Orders    Hepatitis C antibody    Screening for thyroid disorder        Relevant Orders    TSH, 3rd generation with Free T4 reflex    Screening for endocrine, metabolic and immunity disorder        Relevant Orders    CBC and differential    Comprehensive metabolic panel           Preventive health issues were discussed with patient, and age appropriate screening tests were ordered as noted in patient's After Visit Summary  Personalized health advice and appropriate referrals for health education or preventive services given if needed, as noted in patient's After Visit Summary  History of Present Illness:     Patient presents for Welcome to Medicare visit  Patient Care Team:  Taylor Aguirre DO as PCP - General  DO Srini Sierra DO Glyn Ngo, DO (Dermatology)     Review of Systems:     Review of Systems   Constitutional: Negative for chills and fever  HENT: Negative for congestion and sore throat  Eyes: Negative for pain and visual disturbance  Respiratory: Negative for cough, shortness of breath and wheezing  Cardiovascular: Negative for chest pain, palpitations and leg swelling  Gastrointestinal: Negative for abdominal pain, blood in stool, constipation, diarrhea, nausea and vomiting  Endocrine: Negative for polydipsia and polyuria  Genitourinary: Negative for difficulty urinating, dysuria, vaginal bleeding and vaginal pain  Musculoskeletal: Positive for arthralgias  Negative for back pain and neck pain  Skin: Negative for rash and wound  Neurological: Negative for dizziness, weakness and headaches  Hematological: Negative for adenopathy  Psychiatric/Behavioral: Negative for behavioral problems, confusion and dysphoric mood  The patient is not nervous/anxious         Problem List:     Patient Active Problem List   Diagnosis    Atrophy of vagina    Cataracta    Osteopenia    Macrocytosis    Insomnia    Squamous cell skin cancer      Past Medical and Surgical History:     Past Medical History:   Diagnosis Date    Breast asymmetry     Resolved: Nov 9, 2017    Causalgia of upper limb     last assessed: Jan 29, 2014    Elevated AST (SGOT)     Last assessed: Mike 3, 2014    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)     Varicose veins with pain, unspecified laterality     Resolved: June 6, 2017    Vasovagal syncope     last assessed: Dec 30, 2013     Past Surgical History:   Procedure Laterality Date    CATARACT EXTRACTION, BILATERAL Bilateral 2018 january and april    COLONOSCOPY      Complete - [place] 2/13/13 - Hemorrhoids, repeat 5 years d/t family h/o colon cancer     FOOT SURGERY      MOUTH SURGERY      Tooth Extraction - last assessed: Aug 19, 2014    MOUTH SURGERY N/A 11/2017    SKIN BIOPSY Right 07/2018    right thigh    TONSILLECTOMY      last assessed: Aug 19, 2014      Family History:     Family History   Problem Relation Age of Onset    Uterine cancer Mother     Colon cancer Mother     Lung cancer Father     Colon cancer Brother       Social History:     Social History     Socioeconomic History    Marital status: /Civil Union     Spouse name: None    Number of children: 2    Years of education: None    Highest education level: None   Occupational History    None   Social Needs    Financial resource strain: None    Food insecurity:     Worry: None     Inability: None    Transportation needs:     Medical: None     Non-medical: None   Tobacco Use    Smoking status: Never Smoker    Smokeless tobacco: Never Used   Substance and Sexual Activity    Alcohol use: Yes     Comment: 1 per day - social/ drinks wine     Drug use: No    Sexual activity: None   Lifestyle    Physical activity:     Days per week: None     Minutes per session: None    Stress: None   Relationships    Social connections:     Talks on phone: None     Gets together: None     Attends Hinduism service: None     Active member of club or organization: None     Attends meetings of clubs or organizations: None     Relationship status: None    Intimate partner violence:     Fear of current or ex partner: None     Emotionally abused: None     Physically abused: None     Forced sexual activity: None   Other Topics Concern    None   Social History Narrative    Always uses seat belt     Caffeine use     [facility]       Medications and Allergies:     Current Outpatient Medications   Medication Sig Dispense Refill    betamethasone, augmented, (DIPROLENE) 0 05 % ointment APPLY TO RASH ON UPPER/ LOWER EXTREMITIES TWICE DAILY X10-14 DAYS (DO NOT USE ON FACE OR BODY FOLDS)  1    Calcium Carbonate-Vitamin D3 (CALCIUM 600/VITAMIN D) 600-400 MG-UNIT TABS Take 1 tablet by mouth daily      estradiol (VAGIFEM) 10 MCG TABS vaginal tablet Insert 1 tablet (10 mcg total) into the vagina 2 (two) times a week 24 tablet 3    zolpidem (AMBIEN) 5 mg tablet Take   5-1 tab as needed for sleep 90 tablet 0     No current facility-administered medications for this visit        No Known Allergies   Immunizations:     Immunization History   Administered Date(s) Administered    H1N1, All Formulations 11/16/2009    INFLUENZA 11/17/2017, 11/12/2018    Influenza Split High Dose Preservative Free IM 11/17/2017    Influenza, high dose seasonal 0 5 mL 11/12/2018, 11/20/2019      Health Maintenance:         Topic Date Due    Hepatitis C Screening  1945    DXA SCAN  10/30/2019    MAMMOGRAM  11/21/2019    CRC Screening: Colonoscopy  01/22/2023         Topic Date Due    DTaP,Tdap,and Td Vaccines (1 - Tdap) 01/03/1958    Pneumococcal Vaccine: 65+ Years (1 of 2 - PCV13) 01/03/2012      Medicare Screening Tests and Risk Assessments:     Mulu Worley is here for her Subsequent Wellness visit  Last Medicare Wellness visit information reviewed, patient interviewed and updates made to the record today  Health Risk Assessment:   Patient rates overall health as excellent  Patient feels that their physical health rating is same  Eyesight was rated as same  Hearing was rated as same  Patient feels that their emotional and mental health rating is same  Pain experienced in the last 7 days has been some  Patient's pain rating has been 2/10  Patient states that she has experienced no weight loss or gain in last 6 months  R shoulder pain intermittently with certain movements    Depression Screening:   PHQ-2 Score: 0      Fall Risk Screening: In the past year, patient has experienced: no history of falling in past year      Urinary Incontinence Screening:   Patient has not leaked urine accidently in the last six months  Home Safety:  Patient does not have trouble with stairs inside or outside of their home  Patient has working smoke alarms and has working carbon monoxide detector  Home safety hazards include: none  Nutrition:   Current diet is Regular, No Added Salt and Limited junk food  Medications:   Patient is not currently taking any over-the-counter supplements  Patient is able to manage medications  Activities of Daily Living (ADLs)/Instrumental Activities of Daily Living (IADLs):   Walk and transfer into and out of bed and chair?: Yes  Dress and groom yourself?: Yes    Bathe or shower yourself?: Yes    Feed yourself?  Yes  Do your laundry/housekeeping?: Yes  Manage your money, pay your bills and track your expenses?: Yes  Make your own meals?: Yes    Do your own shopping?: Yes    Previous Hospitalizations:   Any hospitalizations or ED visits within the last 12 months?: No      Advance Care Planning:   Living will: Yes    Durable POA for healthcare: Yes    Advanced directive: Yes    Advanced directive counseling given: Yes      Cognitive Screening:   Provider or family/friend/caregiver concerned regarding cognition?: No    PREVENTIVE SCREENINGS      Cardiovascular Screening:    General: Risks and Benefits Discussed    Due for: Lipid Panel      Diabetes Screening:     General: Risks and Benefits Discussed    Due for: Blood Glucose      Colorectal Cancer Screening:     General: Screening Current      Breast Cancer Screening:     General: Risks and Benefits Discussed    Due for: Mammogram        Cervical Cancer Screening:    General: Screening Not Indicated and Risks and Benefits Discussed      Osteoporosis Screening:    General: Risks and Benefits Discussed    Due for: DXA Axial      Abdominal Aortic Aneurysm (AAA) Screening:        General: Risks and Benefits Discussed and Screening Not Indicated      Lung Cancer Screening:     General: Risks and Benefits Discussed and Screening Not Indicated      Hepatitis C Screening:    General: Risks and Benefits Discussed    Hep C Screening Accepted: Yes      Other Counseling Topics:   Car/seat belt/driving safety, sunscreen and regular weightbearing exercise  Visual Acuity Screening    Right eye Left eye Both eyes   Without correction: 20/25 20/20 20/20   With correction:           Physical Exam:     /72 (BP Location: Right arm, Patient Position: Sitting, Cuff Size: Standard)   Pulse (!) 116   Temp (!) 97 °F (36 1 °C)   Ht 5' 3" (1 6 m)   Wt 54 kg (119 lb)   LMP  (LMP Unknown)   BMI 21 08 kg/m²     Physical Exam   Constitutional: She appears well-developed and well-nourished  No distress  HENT:   Head: Normocephalic and atraumatic  Right Ear: External ear normal    Left Ear: External ear normal    Mouth/Throat: Oropharynx is clear and moist    Eyes: Conjunctivae are normal  Right eye exhibits no discharge  Left eye exhibits no discharge  Neck: Neck supple  No tracheal deviation present  Cardiovascular: Normal rate, regular rhythm and normal heart sounds  Exam reveals no friction rub  No murmur heard  Neg carotid bruits B/L   Pulmonary/Chest: Effort normal and breath sounds normal  No respiratory distress  She has no wheezes  She has no rales  Abdominal: Soft  She exhibits no distension  There is no tenderness  There is no rebound and no guarding  Musculoskeletal: She exhibits no edema  Lymphadenopathy:     She has no cervical adenopathy  Neurological: She is alert  She exhibits normal muscle tone  Skin: Skin is warm and dry  No rash noted  Psychiatric: She has a normal mood and affect  Her behavior is normal    Nursing note and vitals reviewed        Joy Luther DO

## 2020-01-03 ENCOUNTER — HOSPITAL ENCOUNTER (OUTPATIENT)
Dept: BONE DENSITY | Facility: CLINIC | Age: 75
Discharge: HOME | End: 2020-01-03
Payer: MEDICARE

## 2020-01-03 VITALS — BODY MASS INDEX: 20.73 KG/M2 | HEIGHT: 63 IN | WEIGHT: 117 LBS

## 2020-01-03 DIAGNOSIS — Z12.39 SCREENING FOR MALIGNANT NEOPLASM OF BREAST: ICD-10-CM

## 2020-01-03 DIAGNOSIS — Z12.31 VISIT FOR SCREENING MAMMOGRAM: ICD-10-CM

## 2020-01-03 PROCEDURE — 77063 BREAST TOMOSYNTHESIS BI: CPT

## 2020-01-03 PROCEDURE — 77080 DXA BONE DENSITY AXIAL: CPT

## 2020-01-03 PROCEDURE — 77067 SCR MAMMO BI INCL CAD: CPT

## 2020-01-07 ENCOUNTER — APPOINTMENT (OUTPATIENT)
Dept: LAB | Facility: CLINIC | Age: 75
End: 2020-01-07
Payer: MEDICARE

## 2020-01-07 DIAGNOSIS — Z13.6 SCREENING FOR CARDIOVASCULAR CONDITION: ICD-10-CM

## 2020-01-07 DIAGNOSIS — Z11.59 NEED FOR HEPATITIS C SCREENING TEST: ICD-10-CM

## 2020-01-07 LAB
ALBUMIN SERPL BCP-MCNC: 4 G/DL (ref 3.5–5)
ALP SERPL-CCNC: 93 U/L (ref 46–116)
ALT SERPL W P-5'-P-CCNC: 79 U/L (ref 12–78)
ANION GAP SERPL CALCULATED.3IONS-SCNC: 1 MMOL/L (ref 4–13)
AST SERPL W P-5'-P-CCNC: 43 U/L (ref 5–45)
BASOPHILS # BLD AUTO: 0.04 THOUSANDS/ΜL (ref 0–0.1)
BASOPHILS NFR BLD AUTO: 1 % (ref 0–1)
BILIRUB SERPL-MCNC: 0.6 MG/DL (ref 0.2–1)
BUN SERPL-MCNC: 13 MG/DL (ref 5–25)
CALCIUM SERPL-MCNC: 9.8 MG/DL (ref 8.3–10.1)
CHLORIDE SERPL-SCNC: 103 MMOL/L (ref 100–108)
CHOLEST SERPL-MCNC: 199 MG/DL (ref 50–200)
CO2 SERPL-SCNC: 30 MMOL/L (ref 21–32)
CREAT SERPL-MCNC: 0.81 MG/DL (ref 0.6–1.3)
EOSINOPHIL # BLD AUTO: 0.15 THOUSAND/ΜL (ref 0–0.61)
EOSINOPHIL NFR BLD AUTO: 4 % (ref 0–6)
ERYTHROCYTE [DISTWIDTH] IN BLOOD BY AUTOMATED COUNT: 12.2 % (ref 11.6–15.1)
GFR SERPL CREATININE-BSD FRML MDRD: 72 ML/MIN/1.73SQ M
GLUCOSE P FAST SERPL-MCNC: 83 MG/DL (ref 65–99)
HCT VFR BLD AUTO: 45.3 % (ref 34.8–46.1)
HDLC SERPL-MCNC: 116 MG/DL
HGB BLD-MCNC: 14.8 G/DL (ref 11.5–15.4)
IMM GRANULOCYTES # BLD AUTO: 0.01 THOUSAND/UL (ref 0–0.2)
IMM GRANULOCYTES NFR BLD AUTO: 0 % (ref 0–2)
LDLC SERPL CALC-MCNC: 73 MG/DL (ref 0–100)
LYMPHOCYTES # BLD AUTO: 0.59 THOUSANDS/ΜL (ref 0.6–4.47)
LYMPHOCYTES NFR BLD AUTO: 16 % (ref 14–44)
MCH RBC QN AUTO: 32.6 PG (ref 26.8–34.3)
MCHC RBC AUTO-ENTMCNC: 32.7 G/DL (ref 31.4–37.4)
MCV RBC AUTO: 100 FL (ref 82–98)
MONOCYTES # BLD AUTO: 0.64 THOUSAND/ΜL (ref 0.17–1.22)
MONOCYTES NFR BLD AUTO: 17 % (ref 4–12)
NEUTROPHILS # BLD AUTO: 2.25 THOUSANDS/ΜL (ref 1.85–7.62)
NEUTS SEG NFR BLD AUTO: 62 % (ref 43–75)
NONHDLC SERPL-MCNC: 83 MG/DL
NRBC BLD AUTO-RTO: 0 /100 WBCS
PLATELET # BLD AUTO: 189 THOUSANDS/UL (ref 149–390)
PMV BLD AUTO: 11.1 FL (ref 8.9–12.7)
POTASSIUM SERPL-SCNC: 4.9 MMOL/L (ref 3.5–5.3)
PROT SERPL-MCNC: 7.5 G/DL (ref 6.4–8.2)
RBC # BLD AUTO: 4.54 MILLION/UL (ref 3.81–5.12)
SODIUM SERPL-SCNC: 134 MMOL/L (ref 136–145)
TRIGL SERPL-MCNC: 52 MG/DL
TSH SERPL DL<=0.05 MIU/L-ACNC: 2.67 UIU/ML (ref 0.36–3.74)
WBC # BLD AUTO: 3.68 THOUSAND/UL (ref 4.31–10.16)

## 2020-01-07 PROCEDURE — 84443 ASSAY THYROID STIM HORMONE: CPT | Performed by: INTERNAL MEDICINE

## 2020-01-07 PROCEDURE — 86803 HEPATITIS C AB TEST: CPT

## 2020-01-07 PROCEDURE — 80053 COMPREHEN METABOLIC PANEL: CPT | Performed by: INTERNAL MEDICINE

## 2020-01-07 PROCEDURE — 36415 COLL VENOUS BLD VENIPUNCTURE: CPT | Performed by: INTERNAL MEDICINE

## 2020-01-07 PROCEDURE — 80061 LIPID PANEL: CPT

## 2020-01-07 PROCEDURE — 85025 COMPLETE CBC W/AUTO DIFF WBC: CPT | Performed by: INTERNAL MEDICINE

## 2020-01-08 DIAGNOSIS — N95.2 VAGINAL ATROPHY: ICD-10-CM

## 2020-01-08 LAB — HCV AB SER QL: NORMAL

## 2020-01-08 RX ORDER — ESTRADIOL 10 UG/1
1 INSERT VAGINAL 2 TIMES WEEKLY
Qty: 24 TABLET | Refills: 2 | Status: CANCELLED | OUTPATIENT
Start: 2020-01-09 | End: 2020-09-17

## 2020-01-13 ENCOUNTER — OFFICE VISIT (OUTPATIENT)
Dept: OBGYN CLINIC | Facility: CLINIC | Age: 75
End: 2020-01-13
Payer: MEDICARE

## 2020-01-13 VITALS
DIASTOLIC BLOOD PRESSURE: 58 MMHG | HEIGHT: 63 IN | BODY MASS INDEX: 21.3 KG/M2 | SYSTOLIC BLOOD PRESSURE: 116 MMHG | WEIGHT: 120.2 LBS

## 2020-01-13 DIAGNOSIS — N95.2 VAGINAL ATROPHY: Primary | ICD-10-CM

## 2020-01-13 DIAGNOSIS — Z12.31 ENCOUNTER FOR SCREENING MAMMOGRAM FOR BREAST CANCER: ICD-10-CM

## 2020-01-13 PROCEDURE — 99213 OFFICE O/P EST LOW 20 MIN: CPT | Performed by: OBSTETRICS & GYNECOLOGY

## 2020-01-13 RX ORDER — ESTRADIOL 10 UG/1
1 INSERT VAGINAL 2 TIMES WEEKLY
Qty: 24 TABLET | Refills: 3 | Status: SHIPPED | OUTPATIENT
Start: 2020-02-10 | End: 2020-01-20 | Stop reason: SDUPTHER

## 2020-01-13 NOTE — PROGRESS NOTES
Assessment/Plan:     Vaginal atrophy - prescription sent for generic Vagifem  given, it came up as a higher cost than what she typically pays  she will check her formulary    Mammogram ordered for next year    Mild osteopenia-we discussed calcium and D, she gets adequate exercise  I reviewed her DEXA with her  Diagnoses and all orders for this visit:    Vaginal atrophy          Subjective:     Patient ID: Corie Gracia is a 70 y o  female  Patient here for refill of her generic Vagifem them  She is doing very well with this and would like to continue  Recently, she has forgotten some doses  She has no other gyn complaints  Normal 3D mammogram earlier this month showed scattered fibroglandular densities and an average risk  DEXA done at the same time showed borderline osteopenia in her femoral neck that is stable and normal lumbar spine and total hip  She is very active and exercises several times a week  Review of Systems   Constitutional: Negative  Gastrointestinal: Negative  Genitourinary: Negative  Objective:     Physical Exam   Constitutional: She appears well-developed  Neck: No tracheal deviation present  No thyromegaly present  Cardiovascular: Normal rate and regular rhythm  Pulmonary/Chest: Effort normal and breath sounds normal  Right breast exhibits no inverted nipple, no mass, no nipple discharge, no skin change and no tenderness  Left breast exhibits no inverted nipple, no mass, no nipple discharge, no skin change and no tenderness  Breasts are symmetrical    Examined seated and supine   Abdominal: Soft  She exhibits no distension and no mass  There is no tenderness  Genitourinary: Rectum normal, vagina normal and uterus normal  No labial fusion  There is no rash, tenderness, lesion or injury on the right labia  There is no rash, tenderness, lesion or injury on the left labia  Cervix exhibits no motion tenderness, no discharge and no friability   Right adnexum displays no mass, no tenderness and no fullness  Left adnexum displays no mass, no tenderness and no fullness  Vitals reviewed

## 2020-01-20 DIAGNOSIS — N95.2 VAGINAL ATROPHY: ICD-10-CM

## 2020-01-21 RX ORDER — ESTRADIOL 10 UG/1
1 INSERT VAGINAL 2 TIMES WEEKLY
Qty: 24 TABLET | Refills: 3 | Status: SHIPPED | OUTPATIENT
Start: 2020-02-10 | End: 2020-11-30 | Stop reason: SDUPTHER

## 2020-01-23 NOTE — TELEPHONE ENCOUNTER
Patient needs her Rx to go to [facility]  Her appointment was on 1/13/20  Linette sent 
English

## 2020-10-14 ENCOUNTER — IMMUNIZATIONS (OUTPATIENT)
Dept: FAMILY MEDICINE CLINIC | Facility: HOSPITAL | Age: 75
End: 2020-10-14
Payer: MEDICARE

## 2020-10-14 DIAGNOSIS — Z23 ENCOUNTER FOR IMMUNIZATION: ICD-10-CM

## 2020-10-14 PROCEDURE — 90662 IIV NO PRSV INCREASED AG IM: CPT | Performed by: FAMILY MEDICINE

## 2020-10-14 PROCEDURE — G0008 ADMIN INFLUENZA VIRUS VAC: HCPCS | Performed by: FAMILY MEDICINE

## 2020-11-30 DIAGNOSIS — N95.2 VAGINAL ATROPHY: ICD-10-CM

## 2020-11-30 RX ORDER — ESTRADIOL 10 UG/1
1 INSERT VAGINAL 2 TIMES WEEKLY
Qty: 8 TABLET | Refills: 0 | Status: SHIPPED | OUTPATIENT
Start: 2020-11-30 | End: 2020-12-28

## 2020-12-25 DIAGNOSIS — N95.2 VAGINAL ATROPHY: ICD-10-CM

## 2020-12-28 RX ORDER — ESTRADIOL 10 UG/1
TABLET VAGINAL
Qty: 8 TABLET | Refills: 0 | Status: SHIPPED | OUTPATIENT
Start: 2020-12-28 | End: 2021-01-18 | Stop reason: SDUPTHER

## 2020-12-29 ENCOUNTER — TELEPHONE (OUTPATIENT)
Dept: FAMILY MEDICINE CLINIC | Facility: HOSPITAL | Age: 75
End: 2020-12-29

## 2021-01-05 ENCOUNTER — TELEPHONE (OUTPATIENT)
Dept: FAMILY MEDICINE CLINIC | Facility: HOSPITAL | Age: 76
End: 2021-01-05

## 2021-01-05 DIAGNOSIS — Z13.0 SCREENING FOR ENDOCRINE, NUTRITIONAL, METABOLIC AND IMMUNITY DISORDER: ICD-10-CM

## 2021-01-05 DIAGNOSIS — G47.00 INSOMNIA, UNSPECIFIED TYPE: ICD-10-CM

## 2021-01-05 DIAGNOSIS — D75.89 MACROCYTOSIS: ICD-10-CM

## 2021-01-05 DIAGNOSIS — M85.80 OSTEOPENIA, UNSPECIFIED LOCATION: Primary | ICD-10-CM

## 2021-01-05 DIAGNOSIS — Z13.29 SCREENING FOR ENDOCRINE, NUTRITIONAL, METABOLIC AND IMMUNITY DISORDER: ICD-10-CM

## 2021-01-05 DIAGNOSIS — Z13.228 SCREENING FOR ENDOCRINE, NUTRITIONAL, METABOLIC AND IMMUNITY DISORDER: ICD-10-CM

## 2021-01-05 DIAGNOSIS — Z13.21 SCREENING FOR ENDOCRINE, NUTRITIONAL, METABOLIC AND IMMUNITY DISORDER: ICD-10-CM

## 2021-01-05 DIAGNOSIS — Z13.220 SCREENING FOR CHOLESTEROL LEVEL: ICD-10-CM

## 2021-01-08 ENCOUNTER — LAB (OUTPATIENT)
Dept: LAB | Facility: HOSPITAL | Age: 76
End: 2021-01-08
Payer: MEDICARE

## 2021-01-08 LAB
ALBUMIN SERPL BCP-MCNC: 4.7 G/DL (ref 3.5–5)
ALP SERPL-CCNC: 116 U/L (ref 46–116)
ALT SERPL W P-5'-P-CCNC: 90 U/L (ref 12–78)
ANION GAP SERPL CALCULATED.3IONS-SCNC: 4 MMOL/L (ref 4–13)
AST SERPL W P-5'-P-CCNC: 53 U/L (ref 5–45)
BASOPHILS # BLD AUTO: 0.06 THOUSANDS/ΜL (ref 0–0.1)
BASOPHILS NFR BLD AUTO: 1 % (ref 0–1)
BILIRUB SERPL-MCNC: 0.85 MG/DL (ref 0.2–1)
BUN SERPL-MCNC: 14 MG/DL (ref 5–25)
CALCIUM SERPL-MCNC: 10.5 MG/DL (ref 8.3–10.1)
CHLORIDE SERPL-SCNC: 98 MMOL/L (ref 100–108)
CO2 SERPL-SCNC: 30 MMOL/L (ref 21–32)
CREAT SERPL-MCNC: 0.76 MG/DL (ref 0.6–1.3)
EOSINOPHIL # BLD AUTO: 0.21 THOUSAND/ΜL (ref 0–0.61)
EOSINOPHIL NFR BLD AUTO: 4 % (ref 0–6)
ERYTHROCYTE [DISTWIDTH] IN BLOOD BY AUTOMATED COUNT: 12.5 % (ref 11.6–15.1)
GFR SERPL CREATININE-BSD FRML MDRD: 78 ML/MIN/1.73SQ M
GLUCOSE SERPL-MCNC: 98 MG/DL (ref 65–140)
HCT VFR BLD AUTO: 48.3 % (ref 34.8–46.1)
HGB BLD-MCNC: 15.7 G/DL (ref 11.5–15.4)
IMM GRANULOCYTES # BLD AUTO: 0.01 THOUSAND/UL (ref 0–0.2)
IMM GRANULOCYTES NFR BLD AUTO: 0 % (ref 0–2)
LYMPHOCYTES # BLD AUTO: 0.85 THOUSANDS/ΜL (ref 0.6–4.47)
LYMPHOCYTES NFR BLD AUTO: 16 % (ref 14–44)
MCH RBC QN AUTO: 32.9 PG (ref 26.8–34.3)
MCHC RBC AUTO-ENTMCNC: 32.5 G/DL (ref 31.4–37.4)
MCV RBC AUTO: 101 FL (ref 82–98)
MONOCYTES # BLD AUTO: 0.39 THOUSAND/ΜL (ref 0.17–1.22)
MONOCYTES NFR BLD AUTO: 7 % (ref 4–12)
NEUTROPHILS # BLD AUTO: 3.75 THOUSANDS/ΜL (ref 1.85–7.62)
NEUTS SEG NFR BLD AUTO: 72 % (ref 43–75)
NRBC BLD AUTO-RTO: 0 /100 WBCS
PLATELET # BLD AUTO: 261 THOUSANDS/UL (ref 149–390)
PMV BLD AUTO: 10.8 FL (ref 8.9–12.7)
POTASSIUM SERPL-SCNC: 5.1 MMOL/L (ref 3.5–5.3)
PROT SERPL-MCNC: 8.1 G/DL (ref 6.4–8.2)
RBC # BLD AUTO: 4.77 MILLION/UL (ref 3.81–5.12)
SODIUM SERPL-SCNC: 132 MMOL/L (ref 136–145)
TSH SERPL DL<=0.05 MIU/L-ACNC: 2.25 UIU/ML (ref 0.36–3.74)
WBC # BLD AUTO: 5.27 THOUSAND/UL (ref 4.31–10.16)

## 2021-01-08 PROCEDURE — 36415 COLL VENOUS BLD VENIPUNCTURE: CPT | Performed by: INTERNAL MEDICINE

## 2021-01-08 PROCEDURE — 80053 COMPREHEN METABOLIC PANEL: CPT | Performed by: INTERNAL MEDICINE

## 2021-01-08 PROCEDURE — 84443 ASSAY THYROID STIM HORMONE: CPT | Performed by: INTERNAL MEDICINE

## 2021-01-08 PROCEDURE — 85025 COMPLETE CBC W/AUTO DIFF WBC: CPT | Performed by: INTERNAL MEDICINE

## 2021-01-18 ENCOUNTER — HOSPITAL ENCOUNTER (OUTPATIENT)
Dept: MAMMOGRAPHY | Facility: CLINIC | Age: 76
Discharge: HOME | End: 2021-01-18
Payer: MEDICARE

## 2021-01-18 ENCOUNTER — ANNUAL EXAM (OUTPATIENT)
Dept: OBGYN CLINIC | Facility: CLINIC | Age: 76
End: 2021-01-18
Payer: MEDICARE

## 2021-01-18 VITALS
DIASTOLIC BLOOD PRESSURE: 78 MMHG | BODY MASS INDEX: 20.83 KG/M2 | HEIGHT: 64 IN | SYSTOLIC BLOOD PRESSURE: 128 MMHG | WEIGHT: 122 LBS

## 2021-01-18 VITALS — WEIGHT: 120 LBS | HEIGHT: 63 IN | BODY MASS INDEX: 21.26 KG/M2

## 2021-01-18 DIAGNOSIS — Z12.31 ENCOUNTER FOR SCREENING MAMMOGRAM FOR BREAST CANCER: ICD-10-CM

## 2021-01-18 DIAGNOSIS — Z01.419 ENCOUNTER FOR ANNUAL ROUTINE GYNECOLOGICAL EXAMINATION: Primary | ICD-10-CM

## 2021-01-18 DIAGNOSIS — N95.2 VAGINAL ATROPHY: ICD-10-CM

## 2021-01-18 PROCEDURE — 77063 BREAST TOMOSYNTHESIS BI: CPT

## 2021-01-18 PROCEDURE — 77067 SCR MAMMO BI INCL CAD: CPT

## 2021-01-18 PROCEDURE — G0101 CA SCREEN;PELVIC/BREAST EXAM: HCPCS | Performed by: OBSTETRICS & GYNECOLOGY

## 2021-01-18 RX ORDER — ESTRADIOL 10 UG/1
1 INSERT VAGINAL 2 TIMES WEEKLY
Qty: 24 TABLET | Refills: 3 | Status: SHIPPED | OUTPATIENT
Start: 2021-01-18 | End: 2022-02-07 | Stop reason: SDUPTHER

## 2021-01-18 NOTE — PROGRESS NOTES
Assessment/Plan:    pap is up to date - she does not require    Mammogram done this morning  3D mammogram ordered for next January  Discussed self breast exams    colon cancer screening-she has a colonoscopy every 5 years, this is up to date  Vaginal atrophy-Vagifem was renewed for  Urethral caruncle-this asymptomatic and small  No treatment needed at this time  discussed preventive care, regular exercise and a healthy diet    No problem-specific Assessment & Plan notes found for this encounter  Diagnoses and all orders for this visit:    Encounter for annual routine gynecological examination    Vaginal atrophy  -     estradiol (Yuvafem) 10 MCG TABS vaginal tablet; Insert 1 tablet (10 mcg total) into the vagina 2 (two) times a week At bedtime    Encounter for screening mammogram for breast cancer  -     Mammo screening bilateral w 3d & cad; Future    Other orders  -     Cancel: Mammo screening bilateral w 3d & cad; Future          Subjective:      Patient ID: John Castaneda is a 78 y o  female  Pt here for yearly  She is using Yuvafem with good results  She would like to continue with this  She just had her mammogram this morning  No Gyn complaints  Normal 3D mammogram last January showed fatty tissue and average risk  DEXA from last January showed mild osteopenia in her femoral neck  She is very active with exercise, yoga and Vasile Chi  She takes calcium and D  The following portions of the patient's history were reviewed and updated as appropriate: allergies, current medications, past family history, past medical history, past social history, past surgical history and problem list     Review of Systems   Constitutional: Negative  Gastrointestinal: Negative  Genitourinary: Negative            Objective:      /78 (BP Location: Left arm, Patient Position: Sitting, Cuff Size: Adult)   Ht 5' 4" (1 626 m)   Wt 55 3 kg (122 lb)   LMP  (LMP Unknown)   BMI 20 94 kg/m² Physical Exam  Vitals signs reviewed  Constitutional:       Appearance: She is well-developed  Neck:      Thyroid: No thyromegaly  Trachea: No tracheal deviation  Cardiovascular:      Rate and Rhythm: Normal rate and regular rhythm  Pulmonary:      Effort: Pulmonary effort is normal       Breath sounds: Normal breath sounds  Chest:      Breasts: Breasts are symmetrical          Right: No inverted nipple, mass, nipple discharge, skin change or tenderness  Left: No inverted nipple, mass, nipple discharge, skin change or tenderness  Abdominal:      General: There is no distension  Palpations: Abdomen is soft  There is no mass  Tenderness: There is no abdominal tenderness  Genitourinary:     Labia:         Right: No rash, tenderness, lesion or injury  Left: No rash, tenderness, lesion or injury  Vagina: Normal       Cervix: No cervical motion tenderness, discharge or friability  Adnexa:         Right: No mass, tenderness or fullness  Left: No mass, tenderness or fullness          Rectum: Normal       Comments: Small urethral caruncle

## 2021-01-19 ENCOUNTER — OFFICE VISIT (OUTPATIENT)
Dept: FAMILY MEDICINE CLINIC | Facility: HOSPITAL | Age: 76
End: 2021-01-19
Payer: MEDICARE

## 2021-01-19 VITALS
BODY MASS INDEX: 21.62 KG/M2 | HEART RATE: 102 BPM | TEMPERATURE: 97.9 F | SYSTOLIC BLOOD PRESSURE: 128 MMHG | DIASTOLIC BLOOD PRESSURE: 82 MMHG | HEIGHT: 63 IN | WEIGHT: 122 LBS

## 2021-01-19 DIAGNOSIS — Z00.00 MEDICARE ANNUAL WELLNESS VISIT, SUBSEQUENT: Primary | ICD-10-CM

## 2021-01-19 DIAGNOSIS — R74.01 TRANSAMINITIS: ICD-10-CM

## 2021-01-19 DIAGNOSIS — Z23 ENCOUNTER FOR IMMUNIZATION: ICD-10-CM

## 2021-01-19 DIAGNOSIS — E83.52 HYPERCALCEMIA: ICD-10-CM

## 2021-01-19 DIAGNOSIS — F51.01 PRIMARY INSOMNIA: ICD-10-CM

## 2021-01-19 DIAGNOSIS — M25.512 ACUTE PAIN OF LEFT SHOULDER: ICD-10-CM

## 2021-01-19 PROCEDURE — G0009 ADMIN PNEUMOCOCCAL VACCINE: HCPCS | Performed by: INTERNAL MEDICINE

## 2021-01-19 PROCEDURE — 1123F ACP DISCUSS/DSCN MKR DOCD: CPT | Performed by: INTERNAL MEDICINE

## 2021-01-19 PROCEDURE — G0439 PPPS, SUBSEQ VISIT: HCPCS | Performed by: INTERNAL MEDICINE

## 2021-01-19 PROCEDURE — 90732 PPSV23 VACC 2 YRS+ SUBQ/IM: CPT | Performed by: INTERNAL MEDICINE

## 2021-01-19 RX ORDER — ZOLPIDEM TARTRATE 5 MG/1
TABLET ORAL
Qty: 90 TABLET | Refills: 0 | Status: SHIPPED | OUTPATIENT
Start: 2021-01-19 | End: 2022-02-22 | Stop reason: SDUPTHER

## 2021-01-19 NOTE — PATIENT INSTRUCTIONS
Medicare Preventive Visit Patient Instructions  Thank you for completing your Welcome to Medicare Visit or Medicare Annual Wellness Visit today  Your next wellness visit will be due in one year (1/19/2022)  The screening/preventive services that you may require over the next 5-10 years are detailed below  Some tests may not apply to you based off risk factors and/or age  Screening tests ordered at today's visit but not completed yet may show as past due  Also, please note that scanned in results may not display below  Preventive Screenings:  Service Recommendations Previous Testing/Comments   Colorectal Cancer Screening  * Colonoscopy    * Fecal Occult Blood Test (FOBT)/Fecal Immunochemical Test (FIT)  * Fecal DNA/Cologuard Test  * Flexible Sigmoidoscopy Age: 54-65 years old   Colonoscopy: every 10 years (may be performed more frequently if at higher risk)  OR  FOBT/FIT: every 1 year  OR  Cologuard: every 3 years  OR  Sigmoidoscopy: every 5 years  Screening may be recommended earlier than age 48 if at higher risk for colorectal cancer  Also, an individualized decision between you and your healthcare provider will decide whether screening between the ages of 74-80 would be appropriate  Colonoscopy: 01/22/2018  FOBT/FIT: Not on file  Cologuard: Not on file  Sigmoidoscopy: Not on file    Screening Current     Breast Cancer Screening Age: 36 years old  Frequency: every 1-2 years  Not required if history of left and right mastectomy Mammogram: 01/18/2021    Screening Current   Cervical Cancer Screening Between the ages of 21-29, pap smear recommended once every 3 years  Between the ages of 33-67, can perform pap smear with HPV co-testing every 5 years     Recommendations may differ for women with a history of total hysterectomy, cervical cancer, or abnormal pap smears in past  Pap Smear: 01/18/2021    Screening Not Indicated   Hepatitis C Screening Once for adults born between 1945 and 1965  More frequently in patients at high risk for Hepatitis C Hep C Antibody: 01/07/2020    Screening Current   Diabetes Screening 1-2 times per year if you're at risk for diabetes or have pre-diabetes Fasting glucose: 83 mg/dL   A1C: 5 3 %    Screening Current   Cholesterol Screening Once every 5 years if you don't have a lipid disorder  May order more often based on risk factors  Lipid panel: 01/07/2020    Screening Current     Other Preventive Screenings Covered by Medicare:  1  Abdominal Aortic Aneurysm (AAA) Screening: covered once if your at risk  You're considered to be at risk if you have a family history of AAA  2  Lung Cancer Screening: covers low dose CT scan once per year if you meet all of the following conditions: (1) Age 50-69; (2) No signs or symptoms of lung cancer; (3) Current smoker or have quit smoking within the last 15 years; (4) You have a tobacco smoking history of at least 30 pack years (packs per day multiplied by number of years you smoked); (5) You get a written order from a healthcare provider  3  Glaucoma Screening: covered annually if you're considered high risk: (1) You have diabetes OR (2) Family history of glaucoma OR (3)  aged 48 and older OR (3)  American aged 72 and older  3  Osteoporosis Screening: covered every 2 years if you meet one of the following conditions: (1) You're estrogen deficient and at risk for osteoporosis based off medical history and other findings; (2) Have a vertebral abnormality; (3) On glucocorticoid therapy for more than 3 months; (4) Have primary hyperparathyroidism; (5) On osteoporosis medications and need to assess response to drug therapy  · Last bone density test (DXA Scan): 01/03/2020   5  HIV Screening: covered annually if you're between the age of 15-65  Also covered annually if you are younger than 13 and older than 72 with risk factors for HIV infection   For pregnant patients, it is covered up to 3 times per pregnancy  Immunizations:  Immunization Recommendations   Influenza Vaccine Annual influenza vaccination during flu season is recommended for all persons aged >= 6 months who do not have contraindications   Pneumococcal Vaccine (Prevnar and Pneumovax)  * Prevnar = PCV13  * Pneumovax = PPSV23   Adults 25-60 years old: 1-3 doses may be recommended based on certain risk factors  Adults 72 years old: Prevnar (PCV13) vaccine recommended followed by Pneumovax (PPSV23) vaccine  If already received PPSV23 since turning 65, then PCV13 recommended at least one year after PPSV23 dose  Hepatitis B Vaccine 3 dose series if at intermediate or high risk (ex: diabetes, end stage renal disease, liver disease)   Tetanus (Td) Vaccine - COST NOT COVERED BY MEDICARE PART B Following completion of primary series, a booster dose should be given every 10 years to maintain immunity against tetanus  Td may also be given as tetanus wound prophylaxis  Tdap Vaccine - COST NOT COVERED BY MEDICARE PART B Recommended at least once for all adults  For pregnant patients, recommended with each pregnancy  Shingles Vaccine (Shingrix) - COST NOT COVERED BY MEDICARE PART B  2 shot series recommended in those aged 48 and above     Health Maintenance Due:      Topic Date Due    MAMMOGRAM  01/03/2021    DXA SCAN  01/03/2022    Colonoscopy Surveillance  01/22/2023    Colorectal Cancer Screening  01/22/2028    Hepatitis C Screening  Completed     Immunizations Due:      Topic Date Due    DTaP,Tdap,and Td Vaccines (1 - Tdap) 01[birth year]/1968    Pneumococcal Vaccine: 65+ Years (2 of 2 - PPSV23) 12/17/2020     Advance Directives   What are advance directives? Advance directives are legal documents that state your wishes and plans for medical care  These plans are made ahead of time in case you lose your ability to make decisions for yourself   Advance directives can apply to any medical decision, such as the treatments you want, and if you want to donate organs  What are the types of advance directives? There are many types of advance directives, and each state has rules about how to use them  You may choose a combination of any of the following:  · Living will: This is a written record of the treatment you want  You can also choose which treatments you do not want, which to limit, and which to stop at a certain time  This includes surgery, medicine, IV fluid, and tube feedings  · Durable power of  for healthcare [facility]): This is a written record that states who you want to make healthcare choices for you when you are unable to make them for yourself  This person, called a proxy, is usually a family member or a friend  You may choose more than 1 proxy  · Do not resuscitate (DNR) order:  A DNR order is used in case your heart stops beating or you stop breathing  It is a request not to have certain forms of treatment, such as CPR  A DNR order may be included in other types of advance directives  · Medical directive: This covers the care that you want if you are in a coma, near death, or unable to make decisions for yourself  You can list the treatments you want for each condition  Treatment may include pain medicine, surgery, blood transfusions, dialysis, IV or tube feedings, and a ventilator (breathing machine)  · Values history: This document has questions about your views, beliefs, and how you feel and think about life  This information can help others choose the care that you would choose  Why are advance directives important? An advance directive helps you control your care  Although spoken wishes may be used, it is better to have your wishes written down  Spoken wishes can be misunderstood, or not followed  Treatments may be given even if you do not want them  An advance directive may make it easier for your family to make difficult choices about your care     Fall Prevention    Fall prevention  includes ways to make your home and other areas safer  It also includes ways you can move more carefully to prevent a fall  Health conditions that cause changes in your blood pressure, vision, or muscle strength and coordination may increase your risk for falls  Medicines may also increase your risk for falls if they make you dizzy, weak, or sleepy  Fall prevention tips:   · Stand or sit up slowly  · Use assistive devices as directed  · Wear shoes that fit well and have soles that   · Wear a personal alarm  · Stay active  · Manage your medical conditions  Home Safety Tips:  · Add items to prevent falls in the bathroom  · Keep paths clear  · Install bright lights in your home  · Keep items you use often on shelves within reach  · Paint or place reflective tape on the edges of your stairs  Urinary Incontinence   Urinary incontinence (UI)  is when you lose control of your bladder  UI develops because your bladder cannot store or empty urine properly  The 3 most common types of UI are stress incontinence, urge incontinence, or both  Medicines:   · May be given to help strengthen your bladder control  Report any side effects of medication to your healthcare provider  Do pelvic muscle exercises often:  Your pelvic muscles help you stop urinating  Squeeze these muscles tight for 5 seconds, then relax for 5 seconds  Gradually work up to squeezing for 10 seconds  Do 3 sets of 15 repetitions a day, or as directed  This will help strengthen your pelvic muscles and improve bladder control  Train your bladder:  Go to the bathroom at set times, such as every 2 hours, even if you do not feel the urge to go  You can also try to hold your urine when you feel the urge to go  For example, hold your urine for 5 minutes when you feel the urge to go  As that becomes easier, hold your urine for 10 minutes  Self-care:   · Keep a UI record  Write down how often you leak urine and how much you leak   Make a note of what you were doing when you leaked urine  · Drink liquids as directed  You may need to limit the amount of liquid you drink to help control your urine leakage  Do not drink any liquid right before you go to bed  Limit or do not have drinks that contain caffeine or alcohol  · Prevent constipation  Eat a variety of high-fiber foods  Good examples are high-fiber cereals, beans, vegetables, and whole-grain breads  Walking is the best way to trigger your intestines to have a bowel movement  · Exercise regularly and maintain a healthy weight  Weight loss and exercise will decrease pressure on your bladder and help you control your leakage  · Use a catheter as directed  to help empty your bladder  A catheter is a tiny, plastic tube that is put into your bladder to drain your urine  · Go to behavior therapy as directed  Behavior therapy may be used to help you learn to control your urge to urinate  © Copyright [employer] 2018 Information is for End User's use only and may not be sold, redistributed or otherwise used for commercial purposes  All illustrations and images included in CareNotes® are the copyrighted property of [employer]  or [facility] Preventive Visit Patient Instructions  Thank you for completing your Welcome to Medicare Visit or Medicare Annual Wellness Visit today  Your next wellness visit will be due in one year (1/19/2022)  The screening/preventive services that you may require over the next 5-10 years are detailed below  Some tests may not apply to you based off risk factors and/or age  Screening tests ordered at today's visit but not completed yet may show as past due  Also, please note that scanned in results may not display below    Preventive Screenings:  Service Recommendations Previous Testing/Comments   Colorectal Cancer Screening  * Colonoscopy    * Fecal Occult Blood Test (FOBT)/Fecal Immunochemical Test (FIT)  * Fecal DNA/Cologuard Test  * Flexible Sigmoidoscopy Age: 54-65 years old   Colonoscopy: every 10 years (may be performed more frequently if at higher risk)  OR  FOBT/FIT: every 1 year  OR  Cologuard: every 3 years  OR  Sigmoidoscopy: every 5 years  Screening may be recommended earlier than age 48 if at higher risk for colorectal cancer  Also, an individualized decision between you and your healthcare provider will decide whether screening between the ages of 74-80 would be appropriate  Colonoscopy: 01/22/2018  FOBT/FIT: Not on file  Cologuard: Not on file  Sigmoidoscopy: Not on file    Screening Current     Breast Cancer Screening Age: 36 years old  Frequency: every 1-2 years  Not required if history of left and right mastectomy Mammogram: 01/18/2021    Screening Current   Cervical Cancer Screening Between the ages of 21-29, pap smear recommended once every 3 years  Between the ages of 33-67, can perform pap smear with HPV co-testing every 5 years  Recommendations may differ for women with a history of total hysterectomy, cervical cancer, or abnormal pap smears in past  Pap Smear: 01/18/2021    Screening Not Indicated   Hepatitis C Screening Once for adults born between 1945 and 1965  More frequently in patients at high risk for Hepatitis C Hep C Antibody: 01/07/2020    Screening Current   Diabetes Screening 1-2 times per year if you're at risk for diabetes or have pre-diabetes Fasting glucose: 83 mg/dL   A1C: 5 3 %    Screening Current   Cholesterol Screening Once every 5 years if you don't have a lipid disorder  May order more often based on risk factors  Lipid panel: 01/07/2020    Screening Current     Other Preventive Screenings Covered by Medicare:  6  Abdominal Aortic Aneurysm (AAA) Screening: covered once if your at risk  You're considered to be at risk if you have a family history of AAA    7  Lung Cancer Screening: covers low dose CT scan once per year if you meet all of the following conditions: (1) Age 50-69; (2) No signs or symptoms of lung cancer; (3) Current smoker or have quit smoking within the last 15 years; (4) You have a tobacco smoking history of at least 30 pack years (packs per day multiplied by number of years you smoked); (5) You get a written order from a healthcare provider  8  Glaucoma Screening: covered annually if you're considered high risk: (1) You have diabetes OR (2) Family history of glaucoma OR (3)  aged 48 and older OR (3)  American aged 72 and older  5  Osteoporosis Screening: covered every 2 years if you meet one of the following conditions: (1) You're estrogen deficient and at risk for osteoporosis based off medical history and other findings; (2) Have a vertebral abnormality; (3) On glucocorticoid therapy for more than 3 months; (4) Have primary hyperparathyroidism; (5) On osteoporosis medications and need to assess response to drug therapy  · Last bone density test (DXA Scan): 01/03/2020  10  HIV Screening: covered annually if you're between the age of 12-76  Also covered annually if you are younger than 13 and older than 72 with risk factors for HIV infection  For pregnant patients, it is covered up to 3 times per pregnancy  Immunizations:  Immunization Recommendations   Influenza Vaccine Annual influenza vaccination during flu season is recommended for all persons aged >= 6 months who do not have contraindications   Pneumococcal Vaccine (Prevnar and Pneumovax)  * Prevnar = PCV13  * Pneumovax = PPSV23   Adults 25-60 years old: 1-3 doses may be recommended based on certain risk factors  Adults 72 years old: Prevnar (PCV13) vaccine recommended followed by Pneumovax (PPSV23) vaccine  If already received PPSV23 since turning 65, then PCV13 recommended at least one year after PPSV23 dose     Hepatitis B Vaccine 3 dose series if at intermediate or high risk (ex: diabetes, end stage renal disease, liver disease)   Tetanus (Td) Vaccine - COST NOT COVERED BY MEDICARE PART B Following completion of primary series, a booster dose should be given every 10 years to maintain immunity against tetanus  Td may also be given as tetanus wound prophylaxis  Tdap Vaccine - COST NOT COVERED BY MEDICARE PART B Recommended at least once for all adults  For pregnant patients, recommended with each pregnancy  Shingles Vaccine (Shingrix) - COST NOT COVERED BY MEDICARE PART B  2 shot series recommended in those aged 48 and above     Health Maintenance Due:      Topic Date Due    MAMMOGRAM  01/03/2021    DXA SCAN  01/03/2022    Colonoscopy Surveillance  01/22/2023    Colorectal Cancer Screening  01/22/2028    Hepatitis C Screening  Completed     Immunizations Due:      Topic Date Due    DTaP,Tdap,and Td Vaccines (1 - Tdap) 01/03/1968    Pneumococcal Vaccine: 65+ Years (2 of 2 - PPSV23) 12/17/2020     Advance Directives   What are advance directives? Advance directives are legal documents that state your wishes and plans for medical care  These plans are made ahead of time in case you lose your ability to make decisions for yourself  Advance directives can apply to any medical decision, such as the treatments you want, and if you want to donate organs  What are the types of advance directives? There are many types of advance directives, and each state has rules about how to use them  You may choose a combination of any of the following:  · Living will: This is a written record of the treatment you want  You can also choose which treatments you do not want, which to limit, and which to stop at a certain time  This includes surgery, medicine, IV fluid, and tube feedings  · Durable power of  for healthcare [facility]): This is a written record that states who you want to make healthcare choices for you when you are unable to make them for yourself  This person, called a proxy, is usually a family member or a friend  You may choose more than 1 proxy    · Do not resuscitate (DNR) order:  A DNR order is used in case your heart stops beating or you stop breathing  It is a request not to have certain forms of treatment, such as CPR  A DNR order may be included in other types of advance directives  · Medical directive: This covers the care that you want if you are in a coma, near death, or unable to make decisions for yourself  You can list the treatments you want for each condition  Treatment may include pain medicine, surgery, blood transfusions, dialysis, IV or tube feedings, and a ventilator (breathing machine)  · Values history: This document has questions about your views, beliefs, and how you feel and think about life  This information can help others choose the care that you would choose  Why are advance directives important? An advance directive helps you control your care  Although spoken wishes may be used, it is better to have your wishes written down  Spoken wishes can be misunderstood, or not followed  Treatments may be given even if you do not want them  An advance directive may make it easier for your family to make difficult choices about your care  Fall Prevention    Fall prevention  includes ways to make your home and other areas safer  It also includes ways you can move more carefully to prevent a fall  Health conditions that cause changes in your blood pressure, vision, or muscle strength and coordination may increase your risk for falls  Medicines may also increase your risk for falls if they make you dizzy, weak, or sleepy  Fall prevention tips:   · Stand or sit up slowly  · Use assistive devices as directed  · Wear shoes that fit well and have soles that   · Wear a personal alarm  · Stay active  · Manage your medical conditions  Home Safety Tips:  · Add items to prevent falls in the bathroom  · Keep paths clear  · Install bright lights in your home  · Keep items you use often on shelves within reach  · Paint or place reflective tape on the edges of your stairs  Urinary Incontinence   Urinary incontinence (UI)  is when you lose control of your bladder  UI develops because your bladder cannot store or empty urine properly  The 3 most common types of UI are stress incontinence, urge incontinence, or both  Medicines:   · May be given to help strengthen your bladder control  Report any side effects of medication to your healthcare provider  Do pelvic muscle exercises often:  Your pelvic muscles help you stop urinating  Squeeze these muscles tight for 5 seconds, then relax for 5 seconds  Gradually work up to squeezing for 10 seconds  Do 3 sets of 15 repetitions a day, or as directed  This will help strengthen your pelvic muscles and improve bladder control  Train your bladder:  Go to the bathroom at set times, such as every 2 hours, even if you do not feel the urge to go  You can also try to hold your urine when you feel the urge to go  For example, hold your urine for 5 minutes when you feel the urge to go  As that becomes easier, hold your urine for 10 minutes  Self-care:   · Keep a UI record  Write down how often you leak urine and how much you leak  Make a note of what you were doing when you leaked urine  · Drink liquids as directed  You may need to limit the amount of liquid you drink to help control your urine leakage  Do not drink any liquid right before you go to bed  Limit or do not have drinks that contain caffeine or alcohol  · Prevent constipation  Eat a variety of high-fiber foods  Good examples are high-fiber cereals, beans, vegetables, and whole-grain breads  Walking is the best way to trigger your intestines to have a bowel movement  · Exercise regularly and maintain a healthy weight  Weight loss and exercise will decrease pressure on your bladder and help you control your leakage  · Use a catheter as directed  to help empty your bladder  A catheter is a tiny, plastic tube that is put into your bladder to drain your urine     · Go to behavior therapy as directed  Behavior therapy may be used to help you learn to control your urge to urinate  © Copyright [employer] 2018 Information is for End User's use only and may not be sold, redistributed or otherwise used for commercial purposes   All illustrations and images included in CareNotes® are the copyrighted property of [employer]  or [address] [place] [address]

## 2021-01-19 NOTE — PROGRESS NOTES
Assessment and Plan:     Problem List Items Addressed This Visit        Other    Insomnia     PDMP Rx website reviewed and no red flag use noted, Ambien refilled upon request, no SE noted, great benefit with prn use         Relevant Medications    zolpidem (AMBIEN) 5 mg tablet    Transaminitis     Encouraged decrease wine to less then 1 glass daily         Relevant Orders    Comprehensive metabolic panel      Other Visit Diagnoses     Medicare annual wellness visit, subsequent    -  Primary    Acute pain of left shoulder        L shoulder bursitis vs tendonitis vs less likely cervicalgia, no s/sx c/w rotator cuff injury at this time, encourage PT and gentle ROM, call w/new or worse sym    Relevant Orders    Ambulatory referral to Physical Therapy    Hypercalcemia        Recheck Ca in 3 mos - stop Ca/Vit D supplement now    Relevant Orders    Comprehensive metabolic panel           Preventive health issues were discussed with patient, and age appropriate screening tests were ordered as noted in patient's After Visit Summary  Personalized health advice and appropriate referrals for health education or preventive services given if needed, as noted in patient's After Visit Summary      WILL CHECK WITH NEURO ABOUT COVID VACCINE WITH H/O [place] BARRE WITH VACCINE IN PAST (2005 AFTER HEP A AND POLIO VACCINE)     History of Present Illness:     Patient presents for Medicare Annual Wellness visit    Patient Care Team:  Bobby Billingsley DO as PCP - General  ChavaDO Sarkis Campbell DO (Dermatology)     Problem List:     Patient Active Problem List   Diagnosis    Atrophy of vagina    Cataracta    Osteopenia    Macrocytosis    Insomnia    Squamous cell skin cancer    Transaminitis      Past Medical and Surgical History:     Past Medical History:   Diagnosis Date    Breast asymmetry     Resolved: Nov 9, 2017    Causalgia of upper limb     last assessed: Jan 29, 2014    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[facility])     Varicose veins with pain, unspecified laterality     Resolved: June 6, 2017    Vasovagal syncope     last assessed: Dec 30, 2013     Past Surgical History:   Procedure Laterality Date    BREAST BIOPSY      unsure laterality or date of biopsy    CATARACT EXTRACTION, BILATERAL Bilateral 2018 january and april    COLONOSCOPY      Complete - [place] 2/13/13 - Hemorrhoids, repeat 5 years d/t family h/o colon cancer     FOOT SURGERY      MOUTH SURGERY      Tooth Extraction - last assessed: Aug 19, 2014    MOUTH SURGERY N/A 11/2017    SKIN BIOPSY Right 07/2018    right thigh    TONSILLECTOMY      last assessed: Aug 19, 2014      Family History:     Family History   Problem Relation Age of Onset    Uterine cancer Mother     Colon cancer Mother         diagnosed in her 45s    Endometrial cancer Mother     Lung cancer Father     Colon cancer Brother 64    No Known Problems Sister     No Known Problems Maternal Grandmother     No Known Problems Maternal Grandfather     Colon cancer Maternal Aunt         unknown age of onset    No Known Problems Maternal Aunt     No Known Problems Paternal Aunt       Social History:     E-Cigarette/Vaping    E-Cigarette Use Never User      E-Cigarette/Vaping Substances    Nicotine No     THC No     CBD No     Flavoring No     Other No     Unknown No      Social History     Socioeconomic History    Marital status: /Civil Union     Spouse name: None    Number of children: 2    Years of education: None    Highest education level: None   Occupational History    None   Social Needs    Financial resource strain: None    Food insecurity     Worry: None     Inability: None    Transportation needs     Medical: None     Non-medical: None   Tobacco Use    Smoking status: Never Smoker    Smokeless tobacco: Never Used   Substance and Sexual Activity    Alcohol use: Yes     Frequency: 4 or more times a week     Drinks per session: 1 or 2     Comment: 1 per day - social/ drinks wine     Drug use: No    Sexual activity: Yes     Partners: Male   Lifestyle    Physical activity     Days per week: None     Minutes per session: None    Stress: None   Relationships    Social connections     Talks on phone: None     Gets together: None     Attends [employer] service: None     Active member of club or organization: None     Attends meetings of clubs or organizations: None     Relationship status: None    Intimate partner violence     Fear of current or ex partner: None     Emotionally abused: None     Physically abused: None     Forced sexual activity: None   Other Topics Concern    None   Social History Narrative    Always uses seat belt     Caffeine use     [facility]       Medications and Allergies:     Current Outpatient Medications   Medication Sig Dispense Refill    betamethasone, augmented, (DIPROLENE) 0 05 % ointment APPLY TO RASH ON UPPER/ LOWER EXTREMITIES TWICE DAILY X10-14 DAYS (DO NOT USE ON FACE OR BODY FOLDS)  1    estradiol (Yuvafem) 10 MCG TABS vaginal tablet Insert 1 tablet (10 mcg total) into the vagina 2 (two) times a week At bedtime 24 tablet 3    zolpidem (AMBIEN) 5 mg tablet Take   5-1 tab as needed for sleep 90 tablet 0     No current facility-administered medications for this visit        No Known Allergies   Immunizations:     Immunization History   Administered Date(s) Administered    H1N1, All Formulations 11/16/2009    INFLUENZA 11/17/2017, 11/12/2018    Influenza Split High Dose Preservative Free IM 11/17/2017    Influenza, high dose seasonal 0 7 mL 11/12/2018, 11/20/2019, 10/14/2020    Pneumococcal Conjugate 13-Valent 12/17/2019      Health Maintenance:         Topic Date Due    MAMMOGRAM  01/03/2021    DXA SCAN  01/03/2022    Colonoscopy Surveillance  01/22/2023    Colorectal Cancer Screening  01/22/2028    Hepatitis C Screening  Completed         Topic Date Due    DTaP,Tdap,and Td Vaccines (1 - Tdap) 01/03/1966    Pneumococcal Vaccine: 65+ Years (2 of 2 - PPSV23) 12/17/2020     Review of Systems   Constitutional: Negative for chills, fever and unexpected weight change  HENT: Positive for hearing loss  Negative for congestion and sore throat  Eyes: Negative for pain, discharge and visual disturbance  Respiratory: Negative for cough, shortness of breath and wheezing  Cardiovascular: Negative for chest pain, palpitations and leg swelling  Gastrointestinal: Negative for abdominal pain, blood in stool, constipation, diarrhea, nausea and vomiting  Endocrine: Negative for polydipsia and polyuria  Genitourinary: Negative for difficulty urinating, dysuria, hematuria, vaginal bleeding and vaginal pain  Musculoskeletal: Positive for arthralgias  Negative for back pain and neck pain  Skin: Negative for rash and wound  Neurological: Negative for dizziness, weakness, light-headedness, numbness and headaches  Hematological: Negative for adenopathy  Psychiatric/Behavioral: Negative for behavioral problems, confusion and dysphoric mood  Medicare Health Risk Assessment:     /82   Pulse 102   Temp 97 9 °F (36 6 °C) (Temporal)   Ht 5' 3" (1 6 m)   Wt 55 3 kg (122 lb)   LMP  (LMP Unknown)   BMI 21 61 kg/m²      Physical Exam   Constitutional: She appears well-developed and well-nourished  No distress  HENT:   Head: Normocephalic and atraumatic  Right Ear: External ear normal    Left Ear: External ear normal    Eyes: Conjunctivae are normal  Right eye exhibits no discharge  Left eye exhibits no discharge  Neck: Neck supple  No tracheal deviation present  Neg carotid bruits B/L   Cardiovascular: Normal rate, regular rhythm and normal heart sounds  No murmur heard  Pulmonary/Chest: Effort normal and breath sounds normal  No respiratory distress  She has no wheezes  She has no rales  Abdominal: Soft  Bowel sounds are normal  There is no abdominal tenderness  There is no guarding  Musculoskeletal: Normal range of motion  General: No deformity  Comments: L shoulder: tenderness to palp along lateral spine of scapula, nml AROM, neg drop arm test, 5/5 B/L UE muscle strength   Lymphadenopathy:     She has no cervical adenopathy  Neurological: She is alert  She exhibits normal muscle tone  Sensation intact to light touch B/L UE   Skin: Skin is warm and dry  No rash noted  Psychiatric: She has a normal mood and affect  Her behavior is normal  Thought content normal    Nursing note and vitals reviewed  Kannan Melgoza is here for her Subsequent Wellness visit  Last Medicare Wellness visit information reviewed, patient interviewed and updates made to the record today  Health Risk Assessment:   Patient rates overall health as excellent  Patient feels that their physical health rating is same  Eyesight was rated as same  Hearing was rated as slightly worse  Patient feels that their emotional and mental health rating is same  Pain experienced in the last 7 days has been some  Patient's pain rating has been 5/10  Patient states that she has experienced no weight loss or gain in last 6 months  Hearing slightly worse - noting if  is in other room she frequently has to have him repeat what he is saying    Depression Screening:   PHQ-2 Score: 0      Fall Risk Screening: In the past year, patient has experienced: history of falling in past year    Number of falls: 1  Injured during fall?: Yes    Feels unsteady when standing or walking?: No    Worried about falling?: No      Urinary Incontinence Screening:   Patient has leaked urine accidently in the last six months  Fell up concrete steps - wet steps with poor treads on shoes    Home Safety:  Patient does not have trouble with stairs inside or outside of their home  Patient has working smoke alarms and has working carbon monoxide detector  Home safety hazards include: none       Nutrition:   Current diet is Regular and Limited junk food  Yoga 2 x's a week and Vasile Chi once a week    Medications:   Patient is currently taking over-the-counter supplements  OTC medications include: see medication list  Patient is able to manage medications  Activities of Daily Living (ADLs)/Instrumental Activities of Daily Living (IADLs):   Walk and transfer into and out of bed and chair?: Yes  Dress and groom yourself?: Yes    Bathe or shower yourself?: Yes    Feed yourself? Yes  Do your laundry/housekeeping?: Yes  Manage your money, pay your bills and track your expenses?: Yes  Make your own meals?: Yes    Do your own shopping?: Yes    Previous Hospitalizations:   Any hospitalizations or ED visits within the last 12 months?: No      Advance Care Planning:   Living will: Yes    Durable POA for healthcare: Yes    Advanced directive: Yes      Cognitive Screening:   Provider or family/friend/caregiver concerned regarding cognition?: No    PREVENTIVE SCREENINGS      Cardiovascular Screening:    General: Screening Current and Risks and Benefits Discussed      Diabetes Screening:     General: Screening Current and Risks and Benefits Discussed      Colorectal Cancer Screening:     General: Screening Current      Breast Cancer Screening:     General: Screening Current and Risks and Benefits Discussed      Cervical Cancer Screening:    General: Risks and Benefits Discussed and Screening Current      Osteoporosis Screening:    General: Screening Current and Risks and Benefits Discussed      Abdominal Aortic Aneurysm (AAA) Screening:        General: Risks and Benefits Discussed and Screening Not Indicated      Lung Cancer Screening:     General: Screening Not Indicated      Hepatitis C Screening:    General: Screening Current and Risks and Benefits Discussed    Other Counseling Topics:   Car/seat belt/driving safety and regular weightbearing exercise         Gage Brewer DO

## 2021-01-19 NOTE — ASSESSMENT & PLAN NOTE
PDMP Rx website reviewed and no red flag use noted, Ambien refilled upon request, no SE noted, great benefit with prn use

## 2021-01-20 ENCOUNTER — TELEPHONE (OUTPATIENT)
Dept: FAMILY MEDICINE CLINIC | Facility: HOSPITAL | Age: 76
End: 2021-01-20

## 2021-01-20 ENCOUNTER — EVALUATION (OUTPATIENT)
Dept: PHYSICAL THERAPY | Facility: CLINIC | Age: 76
End: 2021-01-20
Payer: MEDICARE

## 2021-01-20 DIAGNOSIS — M25.512 ACUTE PAIN OF LEFT SHOULDER: Primary | ICD-10-CM

## 2021-01-20 PROCEDURE — 97112 NEUROMUSCULAR REEDUCATION: CPT | Performed by: PHYSICAL THERAPIST

## 2021-01-20 PROCEDURE — 97161 PT EVAL LOW COMPLEX 20 MIN: CPT | Performed by: PHYSICAL THERAPIST

## 2021-01-20 NOTE — TELEPHONE ENCOUNTER
----- Message from Fred Clark DO sent at 1/20/2021 12:58 PM EST -----  Please notify pt that I spoke with Neuro and prior [place] Bun is NOT a contraindication to the COVID vaccine  Myself and Neuro both recommend the vaccine    Dr Jony Jackson  ----- Message -----  From: Luciana Loyola MD  Sent: 1/20/2021  12:16 PM EST  To: Tiffany Jackson,     Prior GBS is not a contraindication for either Flu or Covid Vaccine  The risk of neuological complications are much higher with the virus itself than the vaccine  No GBS like side effects were reported in either of the clinical trials for the Covid vaccine  Recommendations from the GBS/CIDP foundation are to give the vaccine in any patient with prior GBS or CIDP  Hope this helps  Harlan Floyd     ----- Message -----  From: Fred Clark DO  Sent: 1/20/2021  11:07 AM EST  To: MD Dr Angle Adams,     have a general Neuro question and I wasn't sure who to ask  Dr Star Montes kindly advised you may be able to give me some further input  I have a former [employer] employee who was seen and treated by Dr Daniella Corbett in past for [place] Bun in 2005 after receiving a Hep A and Polio vaccine before travel  She was intubated in ICU for some time  She has retired from [employer] but is still interested in getting the COVID vaccine which I also recommended  Is her previous GB from vaccine a contraindication for the COVID vaccine? Thank you for any help you can provide,    Fred Clark  ----- Message -----  From: Dante Barkley MD  Sent: 1/20/2021   5:59 AM EST  To: Fred Clark, DO Dk Nava,     Did this pt see one of our neuromuscular docs in the past?  I am not familiar with the GBS literature especially with new vaccine  You could reach out to our neuromuscular  Dr Luciana Loyola as she may be more knowledgeable on this question    Thanks  Massimo Call  ----- Message -----  From: Fred Clark DO  Sent: 1/19/2021   5:17 PM EST  To: MD Dr Jesse Hernandez   I have a general Neuro question and I wasn't sure who to ask  Pt was seen and treated by Dr Gela Walker in past for Matt Vital in 2005 after receiving a Hep A and Polio vaccine before travel  She was intubated in ICU for some time  She has retired from [employer] [place] but is still interested in getting the COVID vaccine which I also recommended  Is her previous GB from vaccine a contraindication for the COVID vaccine?     Thanks for your help,  Reynaldo Zee

## 2021-01-20 NOTE — PROGRESS NOTES
PT Evaluation     Today's date: 2021  Patient name: Regina Luna  : 1945  MRN: [id number]  Referring provider: Prerna Pérez MD  Dx:   Encounter Diagnosis     ICD-10-CM    1  Acute pain of left shoulder  M25 512                   Assessment  Assessment details: Regina Luna is a 78 y o  female who presents with pain, decreased strength, decreased ROM and postural  dysfunction  Due to these impairments, patient has difficulty performing a/iadls, recreational activities, work-related activities and engaging in social activities  Patient's clinical presentation is consistent with their referring diagnosis of Acute pain of left shoulder  Patient has been educated in home exercise program and plan of care  Patient would benefit from skilled physical therapy services to address their aforementioned functional limitations and progress towards prior level of function and independence with home exercise program    Impairments: abnormal muscle firing, abnormal or restricted ROM, activity intolerance, impaired physical strength, lacks appropriate home exercise program and pain with function  Understanding of Dx/Px/POC: good   Prognosis: good    Goals  Impairment Goals  - Decrease pain to <1/10  - Improve AROM equal to contralateral side  - Increase strength equal to contralateral side    Functional Goals  - Patient will be independent with comprehensive HEP  - pt will be able to sleep with out pain  - Patient will be able to return to all yoga poses with out pain          Plan  Patient would benefit from: skilled PT  Planned modality interventions: cryotherapy, electrical stimulation/Russian stimulation and thermotherapy: hydrocollator packs  Planned therapy interventions: joint mobilization, manual therapy, patient education, postural training, activity modification, abdominal trunk stabilization, body mechanics training, flexibility, functional ROM exercises, graded exercise, home exercise program, neuromuscular re-education, strengthening, stretching, therapeutic activities, therapeutic exercise, motor coordination training, muscle pump exercises, gait training, balance/weight bearing training, ADL training and breathing training  Frequency: 1x week  Duration in weeks: 8  Plan of Care beginning date: 2021  Plan of Care expiration date: 3/17/2021  Treatment plan discussed with: patient        Subjective Evaluation    History of Present Illness  Mechanism of injury: Pt notes about 1 5 months of left shoulder pain with insidious onset  Pt notes that she gets pain with reaching behind her back, and she is woken from sleep with pain when she tries to move, yoga poses (90 deg abd with 90 deg Er) pain is mainly in the left ant shoulder and bicep area  She denies any c/s pain  She denies any  N/t  She was alternating aleve, and tylenol which did help to calm her symptoms  Quality of life: good    Pain  Current pain ratin  At best pain ratin  At worst pain ratin  Location: left ant shoulder  Quality: dull ache and discomfort    Patient Goals  Patient goals for therapy: decreased pain, increased motion, independence with ADLs/IADLs, increased strength and return to sport/leisure activities          Objective     Static Posture   General Observations  Symmetrical weight bearing  Shoulders  Asymmetric shoulders  Scapulae  Left anteriorly tipped and left downwardly rotated  Postural Observations  Seated posture: fair  Standing posture: fair        Observations   Left Shoulder   Positive for atrophy  Palpation   Left   Tenderness of the biceps, infraspinatus, pectoralis major, subscapularis and supraspinatus       Active Range of Motion   Left Shoulder   Flexion: 135 degrees   Abduction: 130 degrees   External rotation 90°: 75 degrees with pain    Right Shoulder   Flexion: 155 degrees   Abduction: 155 degrees   External rotation 90°: 90 degrees    Joint Play   Left Shoulder  Hypomobile in the anterior capsule, posterior capsule and inferior capsule  Strength/Myotome Testing     Left Shoulder     Planes of Motion   Flexion: 4+   Abduction: 4+   External rotation at 0°: 4-   Internal rotation at 0°: 4-     Right Shoulder     Planes of Motion   Flexion: 4+   Abduction: 4+   External rotation at 0°: 4+   Internal rotation at 0°: 4+     Tests     Left Shoulder   Positive Hawkin's  Negative Neer's, painful arc and passive horizontal adduction                Precautions: hx of guillan-barre      Manuals 1/20            Left subscap, and infra stm DB                                                   Neuro Re-Ed             S/l Er 1# 2x10            Supine open book x12            Prone MT 2x10            pec doorway low hand 15''x3                                                   Ther Ex                                                                                                                     Ther Activity                                       Gait Training                                       Modalities

## 2021-01-27 ENCOUNTER — OFFICE VISIT (OUTPATIENT)
Dept: PHYSICAL THERAPY | Facility: CLINIC | Age: 76
End: 2021-01-27
Payer: MEDICARE

## 2021-01-27 DIAGNOSIS — M25.512 ACUTE PAIN OF LEFT SHOULDER: Primary | ICD-10-CM

## 2021-01-27 PROCEDURE — 97140 MANUAL THERAPY 1/> REGIONS: CPT | Performed by: PHYSICAL THERAPIST

## 2021-01-27 PROCEDURE — 97110 THERAPEUTIC EXERCISES: CPT | Performed by: PHYSICAL THERAPIST

## 2021-01-27 NOTE — PROGRESS NOTES
Daily Note     Today's date: 2021  Patient name: Rylee Hahn  : 1945  MRN: [id number]  Referring provider: Olive Henry, *  Dx:   Encounter Diagnosis     ICD-10-CM    1  Acute pain of left shoulder  M25 512                   Subjective: pt notes that she was feeling better for 5 days after her evaluation, but yesterday woke with more pain in the shoulder again  Objective: See treatment diary below      Assessment: Pt with increased ttp to the left bicep today, secondary to decreased activation of the post scapular musculature, and increased bicep activation  Worked on scapular control and awareness for current exercises  Added in band exercises today  If tolerable will add these to hep nv  Plan: Continue per plan of care        Precautions: hx of guillan-barre      Manuals            Left subscap, and infra stm DB DB + bicep           Left pec pin and stretch  DB                                     Neuro Re-Ed             S/l Er 1# 2x10            Supine open book x12 reviewed performance, and proper form 3'           Prone MT 2x10                                                                Ther Ex             pec doorway low hand 15''x3    15''x3              Bicep stretch (elbow bent)  3''x10           Shoulder row  blk 2x10           shld ext  Blue 2x10           shld Er  Red 2x10 (to neutral)           shld IR  Red 2x10                                     Ther Activity                                       Gait Training                                       Modalities

## 2021-01-29 DIAGNOSIS — N95.2 VAGINAL ATROPHY: ICD-10-CM

## 2021-01-29 RX ORDER — ESTRADIOL 10 UG/1
TABLET VAGINAL
Qty: 8 TABLET | Refills: 0 | OUTPATIENT
Start: 2021-01-29

## 2021-02-03 ENCOUNTER — APPOINTMENT (OUTPATIENT)
Dept: PHYSICAL THERAPY | Facility: CLINIC | Age: 76
End: 2021-02-03
Payer: MEDICARE

## 2021-02-05 ENCOUNTER — IMMUNIZATIONS (OUTPATIENT)
Dept: FAMILY MEDICINE CLINIC | Facility: HOSPITAL | Age: 76
End: 2021-02-05

## 2021-02-05 DIAGNOSIS — Z23 ENCOUNTER FOR IMMUNIZATION: Primary | ICD-10-CM

## 2021-02-05 PROCEDURE — 91301 SARS-COV-2 / COVID-19 MRNA VACCINE (MODERNA) 100 MCG: CPT

## 2021-02-05 PROCEDURE — 0011A SARS-COV-2 / COVID-19 MRNA VACCINE (MODERNA) 100 MCG: CPT

## 2021-02-08 ENCOUNTER — OFFICE VISIT (OUTPATIENT)
Dept: PHYSICAL THERAPY | Facility: CLINIC | Age: 76
End: 2021-02-08
Payer: MEDICARE

## 2021-02-08 DIAGNOSIS — M25.512 ACUTE PAIN OF LEFT SHOULDER: Primary | ICD-10-CM

## 2021-02-08 PROCEDURE — 97110 THERAPEUTIC EXERCISES: CPT | Performed by: PHYSICAL THERAPIST

## 2021-02-08 PROCEDURE — 97140 MANUAL THERAPY 1/> REGIONS: CPT | Performed by: PHYSICAL THERAPIST

## 2021-02-08 NOTE — PROGRESS NOTES
Daily Note     Today's date: 2021  Patient name: Apple Wolf  : 1945  MRN: [id number]  Referring provider: Javier Warner, *  Dx:   Encounter Diagnosis     ICD-10-CM    1  Acute pain of left shoulder  M25 512                   Subjective: pt notes an improvement in pain levels since last visit  Objective: See treatment diary below      Assessment: Pt continues iwht weak external rotators, and periscapular muscles  Added in exercises to improve these to her HEP  Pt with almost full pain free supine passive Er at 90-90 today post manuals    Plan: Continue per plan of care        Precautions: hx of guillan-barre      Manuals  2          Left subscap, and infra stm DB DB + bicep DB          Left pec pin and stretch  DB DB                                    Neuro Re-Ed             S/l Er 1# 2x10            Supine open book x12 reviewed performance, and proper form 3'           Prone MT 2x10                                                                Ther Ex             pec doorway low hand 15''x3    15''x3              Bicep stretch (elbow bent)  3''x10           Shoulder row  blk 2x10           shld ext  Blue 2x10           shld Er  Red 2x10 (to neutral) bilateral green 2x10          shld IR  Red 2x10           Supine H abd   gtb 2x10          Supine pnf   gtb 2x10          Ther Activity                                       Gait Training                                       Modalities

## 2021-02-10 ENCOUNTER — OFFICE VISIT (OUTPATIENT)
Dept: PHYSICAL THERAPY | Facility: CLINIC | Age: 76
End: 2021-02-10
Payer: MEDICARE

## 2021-02-10 DIAGNOSIS — M25.512 ACUTE PAIN OF LEFT SHOULDER: Primary | ICD-10-CM

## 2021-02-10 PROCEDURE — 97140 MANUAL THERAPY 1/> REGIONS: CPT

## 2021-02-10 PROCEDURE — 97110 THERAPEUTIC EXERCISES: CPT

## 2021-02-10 NOTE — PROGRESS NOTES
Daily Note     Today's date: 2/10/2021  Patient name: Regina Luna  : 1945  MRN: [id number]  Referring provider: Tessa Vee, *  Dx:   Encounter Diagnosis     ICD-10-CM    1  Acute pain of left shoulder  M25 512                   Subjective: pt states that she is sore this morning and reports that she did the band exercises last night for her 1xday and already this am but was not able to do as many reps with diagonal and Er      Objective: See treatment diary below      Assessment:   Patient with moderate amount of tightness and spasm in infra, pec,bicep and lateral delt  Reviewed band exercises for form but did not perform full reps 2* increased soreness and discomfort  Also no additional exercises for pt HEP      Plan: Progress treatment as tolerated         Precautions: hx of guillan-barre      Manuals 1/20 1/27 2/8 2/10         Left subscap, and infra stm DB DB + bicep DB DL         Left pec pin and stretch  DB DB DL                                   Neuro Re-Ed             S/l Er 1# 2x10            Supine open book x12 reviewed performance, and proper form 3'           Prone MT 2x10                                                                Ther Ex             pec doorway low hand 15''x3    15''x3     reviewed         Bicep stretch (elbow bent)  3''x10           Shoulder row  blk 2x10           shld ext  Blue 2x10           shld Er  Red 2x10 (to neutral) bilateral green 2x10 reviewed         shld IR  Red 2x10           Supine H abd   gtb 2x10 reviewed         Supine pnf   gtb 2x10 reviewed         Ther Activity                                       Gait Training                                       Modalities

## 2021-02-17 ENCOUNTER — OFFICE VISIT (OUTPATIENT)
Dept: PHYSICAL THERAPY | Facility: CLINIC | Age: 76
End: 2021-02-17
Payer: MEDICARE

## 2021-02-17 DIAGNOSIS — M25.512 ACUTE PAIN OF LEFT SHOULDER: Primary | ICD-10-CM

## 2021-02-17 PROCEDURE — 97140 MANUAL THERAPY 1/> REGIONS: CPT

## 2021-02-17 NOTE — PROGRESS NOTES
Daily Note     Today's date: 2021  Patient name: Dat Batista  : 1945  MRN: [id number]  Referring provider: Kathie Alvarez DO  Dx:   Encounter Diagnosis     ICD-10-CM    1  Acute pain of left shoulder  M25 512                   Subjective: pt notes that she is still sore that runs down arm  She states that all exercises feel fine except ER, she gets pain  Unsure if she should  Objective: See treatment diary below      Assessment: tightness in deltoid and infraspinatus noted    Patient was instructed to try tennis ball to A with tightness at home but only on each spot for less than 30 seconds  Pt instructed to not spsh through the pain that is going down arm with ER, make sure she is in correct posture and try again but if still painful to stop that exercise for that session  She was more sore in infra region after manuals and try of tennis ball, decllined CP stated she would perform at home  Plan: Progress treatment as tolerated         Precautions: hx of guillan-barre      Manuals 1/20 1/27 2/8 2/10 2/17        Left subscap, and infra stm DB DB + bicep DB DL DL        Left pec pin and stretch  DB DB DL DL                                  Neuro Re-Ed             S/l Er 1# 2x10            Supine open book x12 reviewed performance, and proper form 3'           Prone MT 2x10                                                                Ther Ex             pec doorway low hand 15''x3    15''x3     reviewed         Bicep stretch (elbow bent)  3''x10           Shoulder row  blk 2x10           shld ext  Blue 2x10           shld Er  Red 2x10 (to neutral) bilateral green 2x10 reviewed         shld IR  Red 2x10           Supine H abd   gtb 2x10 reviewed         Supine pnf   gtb 2x10 reviewed         Ther Activity                                       Gait Training                                       Modalities

## 2021-02-24 ENCOUNTER — OFFICE VISIT (OUTPATIENT)
Dept: PHYSICAL THERAPY | Facility: CLINIC | Age: 76
End: 2021-02-24
Payer: MEDICARE

## 2021-02-24 DIAGNOSIS — M25.512 ACUTE PAIN OF LEFT SHOULDER: Primary | ICD-10-CM

## 2021-02-24 PROCEDURE — 97140 MANUAL THERAPY 1/> REGIONS: CPT

## 2021-03-03 ENCOUNTER — OFFICE VISIT (OUTPATIENT)
Dept: PHYSICAL THERAPY | Facility: CLINIC | Age: 76
End: 2021-03-03
Payer: MEDICARE

## 2021-03-03 ENCOUNTER — IMMUNIZATIONS (OUTPATIENT)
Dept: FAMILY MEDICINE CLINIC | Facility: HOSPITAL | Age: 76
End: 2021-03-03

## 2021-03-03 DIAGNOSIS — M25.512 ACUTE PAIN OF LEFT SHOULDER: Primary | ICD-10-CM

## 2021-03-03 DIAGNOSIS — Z23 ENCOUNTER FOR IMMUNIZATION: Primary | ICD-10-CM

## 2021-03-03 PROCEDURE — 97112 NEUROMUSCULAR REEDUCATION: CPT

## 2021-03-03 PROCEDURE — 97140 MANUAL THERAPY 1/> REGIONS: CPT

## 2021-03-03 PROCEDURE — 91301 SARS-COV-2 / COVID-19 MRNA VACCINE (MODERNA) 100 MCG: CPT

## 2021-03-03 PROCEDURE — 0012A SARS-COV-2 / COVID-19 MRNA VACCINE (MODERNA) 100 MCG: CPT

## 2021-03-03 NOTE — PROGRESS NOTES
Daily Note     Today's date: 3/3/2021  Patient name: Darling Alcaraz  : 1945  MRN: [id number]  Referring provider: Keysha Hector DO  Dx:   Encounter Diagnosis     ICD-10-CM    1  Acute pain of left shoulder  M25 512                   Subjective: pt continues to note improvements but still is having difficulty with B ER      Objective: See treatment diary below      Assessment: Tightess remains in bicep region and soreness in infraspinatus  Changed bilateral ER to Single ER with left arm and red band with better tolerance and increased motion compared to green band with less pain  Added serratus exercises to program in supine as she was unable to tolerate standing exercises for serratus as she got pain in anterior shoulder arm with it  Pt was with more soreness in infra after all trials of exercises noted from fatigue of muscle  Updated HEP      Plan: Progress treatment as tolerated         Precautions: hx of guillan-barre      Manuals 1/20 1/27 2/8 2/10 2/17 2/24 3/3      Left subscap, and infra stm DB DB + bicep DB DL DL DL DL      Left pec pin and stretch  DB DB DL DL DL DL                                Neuro Re-Ed             S/l Er 1# 2x10            Supine open book x12 reviewed performance, and proper form 3'           Prone MT 2x10            Bent over row      x20       Bent over ext      x10       Serratus punch into flexion       Red x15      Serratus ABC       x1      Ther Ex             pec doorway low hand 15''x3    15''x3     reviewed         Bicep stretch (elbow bent)  3''x10           Shoulder row  blk 2x10           shld ext  Blue 2x10           shld Er  Red 2x10 (to neutral) bilateral green 2x10 reviewed   Single arm with red band x10      shld IR  Red 2x10           Supine H abd   gtb 2x10 reviewed         Supine pnf   gtb 2x10 reviewed         Ther Activity                                       Gait Training                                       Modalities

## 2021-03-09 ENCOUNTER — OFFICE VISIT (OUTPATIENT)
Dept: PHYSICAL THERAPY | Facility: CLINIC | Age: 76
End: 2021-03-09
Payer: MEDICARE

## 2021-03-09 DIAGNOSIS — M25.512 ACUTE PAIN OF LEFT SHOULDER: Primary | ICD-10-CM

## 2021-03-09 PROCEDURE — 97140 MANUAL THERAPY 1/> REGIONS: CPT

## 2021-03-09 PROCEDURE — 97112 NEUROMUSCULAR REEDUCATION: CPT

## 2021-03-09 PROCEDURE — 97110 THERAPEUTIC EXERCISES: CPT

## 2021-03-17 ENCOUNTER — EVALUATION (OUTPATIENT)
Dept: PHYSICAL THERAPY | Facility: CLINIC | Age: 76
End: 2021-03-17
Payer: MEDICARE

## 2021-03-17 DIAGNOSIS — M54.31 SCIATICA OF RIGHT SIDE: ICD-10-CM

## 2021-03-17 DIAGNOSIS — M25.512 ACUTE PAIN OF LEFT SHOULDER: Primary | ICD-10-CM

## 2021-03-17 PROCEDURE — 97140 MANUAL THERAPY 1/> REGIONS: CPT | Performed by: PHYSICAL THERAPIST

## 2021-03-17 PROCEDURE — 97164 PT RE-EVAL EST PLAN CARE: CPT | Performed by: PHYSICAL THERAPIST

## 2021-03-17 NOTE — PROGRESS NOTES
PT Evaluation     Today's date: 3/17/2021  Patient name: Zack Buck  : 1945  MRN: [id number]  Referring provider: Muriel Monique DO  Dx:   Encounter Diagnosis     ICD-10-CM    1  Acute pain of left shoulder  M25 512    2  Sciatica of right side  M54 31        Start Time: 0800  Stop Time: 0845  Total time in clinic (min): 45 minutes    Assessment  Assessment details: Zack Buck has been compliant with attending PT and home exercise program since initial eval   Dot Hinkle  has made improvements in objective data since initial eval but is still limited compared to prior level of function  Dot Hinkle continues with above listed impairments and would benefit from additional skilled PT to address these deficits to return to prior level of function  Janis Fox also presents to PT today for evaluation of acute right buttock pain which presents like sciatica with out l/s pain  Pt has tight and weak gluteals on the right hip leading to increased pressure on the sciatic nerve increasing post thigh pain  Pt would benefit from skilled PT to return her to a more functional premorbid condition  Impairments: abnormal muscle firing, abnormal or restricted ROM, activity intolerance, impaired physical strength, lacks appropriate home exercise program and pain with function  Understanding of Dx/Px/POC: good   Prognosis: good    Goals  Impairment Goals  - Decrease pain to <1/10 partially met  - Improve AROM equal to contralateral side partially met  - Increase strength equal to contralateral side partially  met    Functional Goals  - Patient will be independent with comprehensive HEP  - pt will be able to sleep with out pain met  - Patient will be able to return to all yoga poses with out pain  Partially met          Plan  Patient would benefit from: skilled PT  Planned modality interventions: cryotherapy, electrical stimulation/Russian stimulation and thermotherapy: hydrocollator packs  Planned therapy interventions: joint mobilization, manual therapy, patient education, postural training, activity modification, abdominal trunk stabilization, body mechanics training, flexibility, functional ROM exercises, graded exercise, home exercise program, neuromuscular re-education, strengthening, stretching, therapeutic activities, therapeutic exercise, motor coordination training, muscle pump exercises, gait training, balance/weight bearing training, ADL training and breathing training  Frequency: 1x week  Duration in weeks: 8  Plan of Care beginning date: 3/17/2021  Plan of Care expiration date: 2021  Treatment plan discussed with: patient        Subjective Evaluation    History of Present Illness  Mechanism of injury: Pt notes that overall her shoulder is much better but still gives her some pain with reaching fully behind her back, and raising her arm in to a throwing position (90 deg abd, and 90 deg ext rot)  Pt also presents to PT with a few week hx of right sub gluteal pain that is limiting her from doing some yoga poses  She is limited with any poses that she needs to bend forward with like downward dog  She is not having any n/t, changes in b/b behavior, and has not symptoms with walking or sleeping  Quality of life: good    Pain  Current pain ratin  At best pain ratin  At worst pain rating: 3  Location: left ant shoulder  Quality: dull ache and discomfort    Patient Goals  Patient goals for therapy: decreased pain, increased motion, independence with ADLs/IADLs, increased strength and return to sport/leisure activities          Objective     Concurrent Complaints  Negative for night pain, disturbed sleep, bladder dysfunction, bowel dysfunction, saddle (S4) numbness, cardiac problem, kidney problem, gallbladder problem, stomach problem, ulcer, appendix problem, spleen problem, pancreas problem, history of cancer, history of trauma and infection    Static Posture   General Observations  Symmetrical weight bearing  Shoulders  Asymmetric shoulders  Scapulae  Left anteriorly tipped and left downwardly rotated  Postural Observations  Seated posture: fair  Standing posture: fair        Observations   Left Shoulder   Positive for atrophy  Palpation   Left   No palpable tenderness to the biceps, pectoralis major, subscapularis and supraspinatus  Tenderness of the infraspinatus  Right   No palpable tenderness to the erector spinae, external abdominal oblique, iliacus, iliopsoas and lumbar paraspinals  Trigger point to quadratus lumborum  Neurological Testing     Sensation     Lumbar   Left   Intact: light touch    Right   Intact: light touch    Active Range of Motion   Left Shoulder   Flexion: 145 degrees   Abduction: 145 degrees   External rotation 90°: 90 degrees with pain    Right Shoulder   Flexion: 155 degrees   Abduction: 155 degrees   External rotation 90°: 90 degrees    Lumbar   Flexion:  with pain Restriction level: moderate  Extension:  WFL  Left lateral flexion:  WFL  Right lateral flexion:  Restriction level: minimal  Left rotation:  Restriction level: minimal    Joint Play   Left Shoulder  Hypomobile in the anterior capsule      Strength/Myotome Testing     Left Shoulder     Planes of Motion   Flexion: 4+   Abduction: 4+   External rotation at 0°: 4-   External rotation at 90°: 4   Internal rotation at 0°: 4-     Isolated Muscles   Lower trapezius: 3+   Middle trapezius: 3+     Right Shoulder     Planes of Motion   Flexion: 4+   Abduction: 4+   External rotation at 0°: 4+   Internal rotation at 0°: 4+     Isolated Muscles   Lower trapezius: 4+   Middle trapezius: 4+     Left Hip   Planes of Motion   Flexion: 4-    Right Hip   Planes of Motion   Flexion: 4-    Left Knee   Flexion: 4  Extension: 4+    Right Knee   Flexion: 4-  Extension: 4+    Left Ankle/Foot   Dorsiflexion: 4+  Great toe extension: 4+    Right Ankle/Foot   Dorsiflexion: 4+  Great toe extension: 4+    Tests     Left Shoulder Positive Hawkin's  Negative Neer's, painful arc and passive horizontal adduction  Lumbar   Negative prone instability        Right   Positive slump test                    Precautions: hx of guillan-barre      Manuals 1/20 1/27 2/8 2/10 2/17 2/24 3/3 3/9 3/17    Left subscap, and infra stm DB DB + bicep DB DL DL DL DL DL DB    Left pec pin and stretch  DB DB DL DL DL DL DL DB    UT tpr        DL     Right  Gluteal stm             Neuro Re-Ed             S/l Er 1# 2x10            Supine open book x12 reviewed performance, and proper form 3'           Prone MT 2x10        2x10    Bent over row      x20   Prone Y 2x10    Bent over ext      x10       Serratus punch into flexion       Red x15 Red x15     Serratus ABC       x1 x1     Ther Ex             pec doorway low hand 15''x3    15''x3     reviewed         Bicep stretch (elbow bent)  3''x10           Shoulder row  blk 2x10           shld ext  Blue 2x10           shld Er  Red 2x10 (to neutral) bilateral green 2x10 reviewed   Single arm with red band x10      shld IR  Red 2x10      Ut/levator stretch x5 ea     Supine H abd   gtb 2x10 reviewed         Supine pnf   gtb 2x10 reviewed         Ther Activity                                       Gait Training                                       Modalities

## 2021-03-24 ENCOUNTER — OFFICE VISIT (OUTPATIENT)
Dept: PHYSICAL THERAPY | Facility: CLINIC | Age: 76
End: 2021-03-24
Payer: MEDICARE

## 2021-03-24 DIAGNOSIS — M54.31 SCIATICA OF RIGHT SIDE: ICD-10-CM

## 2021-03-24 DIAGNOSIS — M25.512 ACUTE PAIN OF LEFT SHOULDER: Primary | ICD-10-CM

## 2021-03-24 PROCEDURE — 97140 MANUAL THERAPY 1/> REGIONS: CPT

## 2021-03-24 PROCEDURE — 97110 THERAPEUTIC EXERCISES: CPT

## 2021-03-24 NOTE — PROGRESS NOTES
Daily Note     Today's date: 3/24/2021  Patient name: Lv Jacome  : 1945  MRN: [id number]  Referring provider: Cassi Narvaez DO  Dx:   Encounter Diagnosis     ICD-10-CM    1  Acute pain of left shoulder  M25 512    2  Sciatica of right side  M54 31                   Subjective: pt notes that she is still achy in shoulder and now her right shoulder is a bit sore but unknown reason  Glute/hip is feeling a bit better with exercises  Objective: See treatment diary below      Assessment: Tolerated treatment with some continued tightness in bicep region and TTP over piriformis and glute with light pressure    Reviewed HEP for form and technique with only small correction for scaption     Pt was advised to start to perform shoulder exercises on right shoulder  Pt agrees  Plan: Continue per plan of care           Precautions: hx of guillan-barre      Manuals 1/20 1/27 2/8 2/10 2/17 2/24 3/3 3/9 3/17    Left subscap, and infra stm DB DB + bicep DB DL DL DL DL DL DB    Left pec pin and stretch  DB DB DL DL DL DL DL DB    UT tpr        DL     Right  Gluteal stm             Neuro Re-Ed             S/l Er 1# 2x10            Supine open book x12 reviewed performance, and proper form 3'           Prone MT 2x10        2x10    Bent over row      x20   Prone Y 2x10    Bent over ext      x10       Serratus punch into flexion       Red x15 Red x15     Serratus ABC       x1 x1     Ther Ex             pec doorway low hand 15''x3    15''x3     reviewed         Bicep stretch (elbow bent)  3''x10           Shoulder row  blk 2x10           shld ext  Blue 2x10           shld Er  Red 2x10 (to neutral) bilateral green 2x10 reviewed   Single arm with red band x10      shld IR  Red 2x10      Ut/levator stretch x5 ea     Supine H abd   gtb 2x10 reviewed         Supine pnf   gtb 2x10 reviewed         Ther Activity                                       Gait Training                                       Modalities

## 2021-03-31 ENCOUNTER — OFFICE VISIT (OUTPATIENT)
Dept: PHYSICAL THERAPY | Facility: CLINIC | Age: 76
End: 2021-03-31
Payer: MEDICARE

## 2021-03-31 DIAGNOSIS — M54.31 SCIATICA OF RIGHT SIDE: Primary | ICD-10-CM

## 2021-03-31 DIAGNOSIS — M25.512 ACUTE PAIN OF LEFT SHOULDER: ICD-10-CM

## 2021-03-31 PROCEDURE — 97110 THERAPEUTIC EXERCISES: CPT

## 2021-03-31 PROCEDURE — 97140 MANUAL THERAPY 1/> REGIONS: CPT

## 2021-03-31 NOTE — PROGRESS NOTES
Daily Note     Today's date: 3/31/2021  Patient name: John Castaneda  : 1945  MRN: [id number]  Referring provider: Jeancarlos Mar DO  Dx:   Encounter Diagnosis     ICD-10-CM    1  Sciatica of right side  M54 31    2  Acute pain of left shoulder  M25 512                   Subjective: pt notes that she really feels like she is coming along, she notes less pain in both arm and buttock region  Is performing exercises one time a day       Objective: See treatment diary below      Assessment: Tolerated treatment with less TTP over shoulder but was very tender over piriformis at first but then decreased with manuals performed  Added sit to stands with band to HEP as she tends to fall inward with knees especially with sitting down    Patient would benefit from continued PT      Plan: Continue per plan of care           Precautions: hx of guillan-barre      Manuals 1/20 1/27 2/8 2/10 2/17 2/24 3/3 3/9 3/17 3/31   Left subscap, and infra stm DB DB + bicep DB DL DL DL DL DL DB DL   Left pec pin and stretch  DB DB DL DL DL DL DL DB DL   UT tpr        DL     Right  Gluteal stm          DL   Neuro Re-Ed             S/l Er 1# 2x10            Supine open book x12 reviewed performance, and proper form 3'           Prone MT 2x10        2x10    Bent over row      x20   Prone Y 2x10    Bent over ext      x10       Serratus punch into flexion       Red x15 Red x15     Serratus ABC       x1 x1     Ther Ex             pec doorway low hand 15''x3    15''x3     reviewed         Bicep stretch (elbow bent)  3''x10           Shoulder row  blk 2x10        sciatic sliders x10   shld ext  Blue 2x10        pirifrmis 15"x5   shld Er  Red 2x10 (to neutral) bilateral green 2x10 reviewed   Single arm with red band x10      shld IR  Red 2x10      Ut/levator stretch x5 ea     Supine H abd   gtb 2x10 reviewed         Supine pnf   gtb 2x10 reviewed         Ther Activity             Sit to stand          gtb 2x10                Gait Training Modalities

## 2021-04-07 ENCOUNTER — OFFICE VISIT (OUTPATIENT)
Dept: PHYSICAL THERAPY | Facility: CLINIC | Age: 76
End: 2021-04-07
Payer: MEDICARE

## 2021-04-07 DIAGNOSIS — M25.512 ACUTE PAIN OF LEFT SHOULDER: ICD-10-CM

## 2021-04-07 DIAGNOSIS — M54.31 SCIATICA OF RIGHT SIDE: Primary | ICD-10-CM

## 2021-04-07 PROCEDURE — 97140 MANUAL THERAPY 1/> REGIONS: CPT

## 2021-04-07 PROCEDURE — 97110 THERAPEUTIC EXERCISES: CPT

## 2021-04-07 NOTE — PROGRESS NOTES
Daily Note     Today's date: 2021  Patient name: Jaylyn Morgan  : 1945  MRN: [id number]  Referring provider: Rd Tinsley DO  Dx:   Encounter Diagnosis     ICD-10-CM    1  Sciatica of right side  M54 31    2  Acute pain of left shoulder  M25 512                   Subjective: Pt reports soreness in L shoulder from yard work over the weekend  With the holidays prep she didn't have time to complete HEP over the weekend and felt the difference by having increased soreness  Reports sciatic pain has felt better then before  Objective: See treatment diary below      Assessment: Tolerated treatment well,no increased pain with exercises performed, tenderness in R gluteal and L infra with STM  Patient performed exercises with no vc and good technique  would benefit from continued PT      Plan: Continue per plan of care  Try weight with Y and MT     Patient treated by ROBIN Gibbs under my direct supervision          Precautions: hx of guillan-barre      Manuals 4/7        3/17 3/31   Left subscap, and infra stm PM/DL        DB DL   Left pec pin and stretch PM/DL        DB DL   UT tpr             Right  Gluteal stm PM/DL         DL   Neuro Re-Ed             S/l Er             Supine open book             Bent over  MT 2x10        2x10    Prone Y- bent over 2x10        Prone Y 2x10    Bent over ext             Serratus punch into flexion             Serratus ABC             Ther Ex             pec doorway low hand             Bicep stretch (elbow bent)             Sciatic sliders supine x10         sciatic sliders x10   Piriformis  15"x5         pirifrmis 15"x5   shld Er             shld IR             Supine H abd             Supine pnf             Ther Activity             Sit to stand Green 2x10         gtb 2x10                Gait Training                                       Modalities

## 2021-04-14 ENCOUNTER — APPOINTMENT (OUTPATIENT)
Dept: LAB | Facility: CLINIC | Age: 76
End: 2021-04-14
Payer: MEDICARE

## 2021-04-14 ENCOUNTER — OFFICE VISIT (OUTPATIENT)
Dept: PHYSICAL THERAPY | Facility: CLINIC | Age: 76
End: 2021-04-14
Payer: MEDICARE

## 2021-04-14 DIAGNOSIS — R74.01 TRANSAMINITIS: ICD-10-CM

## 2021-04-14 DIAGNOSIS — M25.512 ACUTE PAIN OF LEFT SHOULDER: Primary | ICD-10-CM

## 2021-04-14 DIAGNOSIS — M54.31 SCIATICA OF RIGHT SIDE: ICD-10-CM

## 2021-04-14 DIAGNOSIS — E83.52 HYPERCALCEMIA: ICD-10-CM

## 2021-04-14 LAB
ALBUMIN SERPL BCP-MCNC: 3.9 G/DL (ref 3.5–5)
ALP SERPL-CCNC: 83 U/L (ref 46–116)
ALT SERPL W P-5'-P-CCNC: 48 U/L (ref 12–78)
ANION GAP SERPL CALCULATED.3IONS-SCNC: 2 MMOL/L (ref 4–13)
AST SERPL W P-5'-P-CCNC: 34 U/L (ref 5–45)
BILIRUB SERPL-MCNC: 0.55 MG/DL (ref 0.2–1)
BUN SERPL-MCNC: 25 MG/DL (ref 5–25)
CALCIUM SERPL-MCNC: 9.7 MG/DL (ref 8.3–10.1)
CHLORIDE SERPL-SCNC: 107 MMOL/L (ref 100–108)
CO2 SERPL-SCNC: 30 MMOL/L (ref 21–32)
CREAT SERPL-MCNC: 0.73 MG/DL (ref 0.6–1.3)
GFR SERPL CREATININE-BSD FRML MDRD: 81 ML/MIN/1.73SQ M
GLUCOSE P FAST SERPL-MCNC: 84 MG/DL (ref 65–99)
POTASSIUM SERPL-SCNC: 4.5 MMOL/L (ref 3.5–5.3)
PROT SERPL-MCNC: 7.3 G/DL (ref 6.4–8.2)
SODIUM SERPL-SCNC: 139 MMOL/L (ref 136–145)

## 2021-04-14 PROCEDURE — 80053 COMPREHEN METABOLIC PANEL: CPT

## 2021-04-14 PROCEDURE — 97140 MANUAL THERAPY 1/> REGIONS: CPT

## 2021-04-14 PROCEDURE — 36415 COLL VENOUS BLD VENIPUNCTURE: CPT

## 2021-04-14 PROCEDURE — 97110 THERAPEUTIC EXERCISES: CPT

## 2021-04-14 PROCEDURE — 97112 NEUROMUSCULAR REEDUCATION: CPT

## 2021-04-21 ENCOUNTER — OFFICE VISIT (OUTPATIENT)
Dept: PHYSICAL THERAPY | Facility: CLINIC | Age: 76
End: 2021-04-21
Payer: MEDICARE

## 2021-04-21 DIAGNOSIS — M25.512 ACUTE PAIN OF LEFT SHOULDER: Primary | ICD-10-CM

## 2021-04-21 DIAGNOSIS — M54.31 SCIATICA OF RIGHT SIDE: ICD-10-CM

## 2021-04-21 PROCEDURE — 97140 MANUAL THERAPY 1/> REGIONS: CPT

## 2021-04-21 PROCEDURE — 97112 NEUROMUSCULAR REEDUCATION: CPT

## 2021-04-21 PROCEDURE — 97110 THERAPEUTIC EXERCISES: CPT

## 2021-04-21 NOTE — PROGRESS NOTES
Daily Note     Today's date: 2021  Patient name: Rosalinda Hughes  : 1945  MRN: [id number]  Referring provider: Keri Bernardo DO  Dx:   Encounter Diagnosis     ICD-10-CM    1  Acute pain of left shoulder  M25 512    2  Sciatica of right side  M54 31                   Subjective: Pt is feeling overall better but still achey, feels like it could be from more outside work  Reports back has not had any issues and no radiating pain, aside from yoga with qped ext into abd, causes pain but no radiating sxs  Objective: See treatment diary below      Assessment: Tightness from T6 to T9/10 noted by DELPHINE Sanchez PT with t/s mobs  Jerri tender in subscap/ pec with STM  Tolerated treatment well, no increase in sxs  Patient demonstrated fatigue post treatment, exhibited good technique with therapeutic exercises and would benefit from continued PT  Added standing serratus punches to HEP  Plan: Continue per plan of care      Patient treated by ROBIN James under my direct supervision          Precautions: hx of guillan-barre      Manuals 4/7 4/14 4/21      3/17 3/31   Left subscap, and infra stm PM/DL PM/DL PM/DL      DB DL   Left pec pin and stretch PM/DL PM/DL PM/DL      DB DL   T/s mobs  Trial nv RN          Shoulder mobs  Trial nv           Right  Gluteal stm PM/DL PM/DL PM/DL       DL   Neuro Re-Ed                          Supine open book             Bent over  MT 2x10 2x10 2x10 #1      2x10    Prone Y- bent over 2x10 2x10 2x10       Prone Y 2x10    Bent over ext             Serratus punch into flexion   2x10 BTB          Serratus ABC   2x10 2#          Ther Ex             pec doorway low hand             Bicep stretch (elbow bent)             Sciatic sliders supine x10 x10 np       sciatic sliders x10   Piriformis  15"x5 15"x5 ea np       pirifrmis 15"x5   Stand TB serratus punch   2x10 GTB          shld IR             Supine H abd             Supine pnf             Ther Activity             Sit to stand Green 2x10 Green 2x10 nv       gtb 2x10                Gait Training                                       Modalities

## 2021-04-28 ENCOUNTER — OFFICE VISIT (OUTPATIENT)
Dept: PHYSICAL THERAPY | Facility: CLINIC | Age: 76
End: 2021-04-28
Payer: MEDICARE

## 2021-04-28 DIAGNOSIS — M25.512 ACUTE PAIN OF LEFT SHOULDER: Primary | ICD-10-CM

## 2021-04-28 DIAGNOSIS — M54.31 SCIATICA OF RIGHT SIDE: ICD-10-CM

## 2021-04-28 PROCEDURE — 97112 NEUROMUSCULAR REEDUCATION: CPT

## 2021-04-28 PROCEDURE — 97110 THERAPEUTIC EXERCISES: CPT

## 2021-04-28 PROCEDURE — 97140 MANUAL THERAPY 1/> REGIONS: CPT

## 2021-04-28 NOTE — PROGRESS NOTES
Daily Note     Today's date: 2021  Patient name: Les Hernandez  : 1945  MRN: [id number]  Referring provider: Dayana Barbour DO  Dx:   Encounter Diagnosis     ICD-10-CM    1  Acute pain of left shoulder  M25 512    2  Sciatica of right side  M54 31                   Subjective: Patient noted soreness/muscle spasm around L rhomboids area pre treatment  Patient noted massaging area a little and it seemed to help a little bit  Objective: See treatment diary below      Assessment: Tolerate treatment fair  Patient noted that after therapist performed STM to area of muscle spasm/ soreness felt a little bit better prior to performing serratus exercises and sit to stands patient noted some increase of soreness in area after performing exercises  Discussed with patient that she could be having increased soreness since performing exercises to specifically target specific muscle groups and that soreness is normal if there is muscle weakness part of strengthening the muscles  Also to apply CP if soreness persists if needed  Plan: Continue per plan of care           Precautions: hx of guillan-barre      Manuals 4/7 4/14 4/21 4/28     3/17 3/31   Left subscap, and infra stm PM/DL PM/DL PM/DL AF     DB DL   Left pec pin and stretch PM/DL PM/DL PM/DL AF  STM     DB DL   T/s mobs  Trial nv RN np         Shoulder mobs  Trial nv           Right  Gluteal stm PM/DL PM/DL PM/DL AF      DL   Neuro Re-Ed                          Supine open book             Bent over  MT 2x10 2x10 2x10 #1 2x10     2x10    Prone Y- bent over 2x10 2x10 2x10  2x10     Prone Y 2x10    Bent over ext             Serratus punch into flexion   2x10 BTB          Serratus ABC   2x10 2# 2 x10 2# NV         Ther Ex             pec doorway low hand             Bicep stretch (elbow bent)             Sciatic sliders supine x10 x10 np       sciatic sliders x10   Piriformis  15"x5 15"x5 ea np       pirifrmis 15"x5   Stand TB serratus punch   2x10 GTB 2x10 GTB         shld IR             Supine H abd             Supine pnf             Ther Activity             Sit to stand Green 2x10 Green 2x10 nv Green 2x10      gtb 2x10                Gait Training                                       Modalities

## 2021-04-29 ENCOUNTER — OFFICE VISIT (OUTPATIENT)
Dept: FAMILY MEDICINE CLINIC | Facility: HOSPITAL | Age: 76
End: 2021-04-29
Payer: MEDICARE

## 2021-04-29 ENCOUNTER — HOSPITAL ENCOUNTER (OUTPATIENT)
Dept: RADIOLOGY | Facility: HOSPITAL | Age: 76
Discharge: HOME | End: 2021-04-29
Payer: MEDICARE

## 2021-04-29 VITALS
WEIGHT: 120.8 LBS | HEIGHT: 63 IN | TEMPERATURE: 97.5 F | DIASTOLIC BLOOD PRESSURE: 82 MMHG | BODY MASS INDEX: 21.4 KG/M2 | HEART RATE: 84 BPM | SYSTOLIC BLOOD PRESSURE: 126 MMHG

## 2021-04-29 DIAGNOSIS — M12.9 ACUTE ARTHROPATHY: Primary | ICD-10-CM

## 2021-04-29 DIAGNOSIS — M12.9 ACUTE ARTHROPATHY: ICD-10-CM

## 2021-04-29 PROCEDURE — 73630 X-RAY EXAM OF FOOT: CPT

## 2021-04-29 PROCEDURE — 99214 OFFICE O/P EST MOD 30 MIN: CPT | Performed by: INTERNAL MEDICINE

## 2021-04-29 RX ORDER — MELOXICAM 7.5 MG/1
7.5 TABLET ORAL DAILY
Qty: 20 TABLET | Refills: 0 | Status: SHIPPED | OUTPATIENT
Start: 2021-04-29 | End: 2022-02-22

## 2021-04-29 NOTE — PROGRESS NOTES
Assessment/Plan:         Diagnoses and all orders for this visit:    Acute arthropathy  Comments:  Pt denies known trauma/injury, did have previous surgery at that area making DJD more likely, will check Xray and try trial of Mobic - SE reviewed, if not better she was told to call next week and would rx a Prednisone taper, advised rest/ice/elevate, call with any new/worse symptoms/F/C  Orders:  -     XR foot 3+ vw right; Future  -     meloxicam (MOBIC) 7 5 mg tablet; Take 1 tablet (7 5 mg total) by mouth daily          Subjective:      Patient ID: Suresh Delatorre is a 78 y o  female  HPI Pt here with c/o R foot pain    Pt states about 6 mos ago noted swelling at top of R foot at base of 2 - 4th toes  Recently the past 2 wks the area is now "achy" intermittently after walking long distances  The pain is now almost constant the past couple of days  She is not aware of any fall/injury/trauma provoking the symptoms  She did have surgery on her R foot years ago d/t an injury and poss Hernandez's neuroma she does have some some lateral deviation of 3rd toe since the surgery and has a plastic insert she uses btw the toes to help this  She notes no numbness/tingling/weakness/F/C  She has tried prn Ibuprofen but has not used it recently  Review of Systems   Constitutional: Negative for chills and fever  Respiratory: Negative for cough and shortness of breath  Cardiovascular: Negative for chest pain and palpitations  Gastrointestinal: Negative for abdominal pain, diarrhea, nausea and vomiting  Musculoskeletal: Positive for arthralgias and joint swelling  Skin: Negative for rash and wound  Neurological: Negative for weakness and numbness  Objective:    /82   Pulse 84   Temp 97 5 °F (36 4 °C) (Temporal)   Ht 5' 3" (1 6 m)   Wt 54 8 kg (120 lb 12 8 oz)   LMP  (LMP Unknown)   BMI 21 40 kg/m²      Physical Exam  Vitals signs and nursing note reviewed     Constitutional:       General: She is not in acute distress  Appearance: She is well-developed  She is not ill-appearing  HENT:      Head: Normocephalic and atraumatic  Eyes:      General:         Right eye: No discharge  Left eye: No discharge  Conjunctiva/sclera: Conjunctivae normal    Neck:      Musculoskeletal: Neck supple  Trachea: No tracheal deviation  Cardiovascular:      Rate and Rhythm: Normal rate and regular rhythm  Heart sounds: Normal heart sounds  No murmur  No friction rub  Pulmonary:      Effort: Pulmonary effort is normal  No respiratory distress  Breath sounds: Normal breath sounds  No wheezing, rhonchi or rales  Musculoskeletal:      Comments: R foot: mild swelling at base of 2nd-4th toes, + tenderness/warmth and erythema at this region   Skin:     General: Skin is warm  Coloration: Skin is not pale  Findings: Erythema present  No bruising or rash  Neurological:      Mental Status: She is alert  Motor: No abnormal muscle tone  Gait: Gait normal    Psychiatric:         Mood and Affect: Mood normal          Behavior: Behavior normal          Thought Content:  Thought content normal          Judgment: Judgment normal

## 2021-05-05 ENCOUNTER — OFFICE VISIT (OUTPATIENT)
Dept: PHYSICAL THERAPY | Facility: CLINIC | Age: 76
End: 2021-05-05
Payer: MEDICARE

## 2021-05-05 DIAGNOSIS — M25.512 ACUTE PAIN OF LEFT SHOULDER: Primary | ICD-10-CM

## 2021-05-05 DIAGNOSIS — M54.31 SCIATICA OF RIGHT SIDE: ICD-10-CM

## 2021-05-05 PROCEDURE — 97110 THERAPEUTIC EXERCISES: CPT | Performed by: PHYSICAL THERAPIST

## 2021-05-05 PROCEDURE — 97112 NEUROMUSCULAR REEDUCATION: CPT | Performed by: PHYSICAL THERAPIST

## 2021-05-05 PROCEDURE — 97140 MANUAL THERAPY 1/> REGIONS: CPT | Performed by: PHYSICAL THERAPIST

## 2021-05-05 NOTE — PROGRESS NOTES
Daily Note     Today's date: 2021  Patient name: Su Mckinley  : 1945  MRN: [id number]  Referring provider: Sharron Esparza DO  Dx:   Encounter Diagnosis     ICD-10-CM    1  Acute pain of left shoulder  M25 512    2  Sciatica of right side  M54 31                   Subjective: Ayaz Lynn explains that she is starting to feel her shoulder improve, she notices less discomfort when reaching behind her back and she is encouraged by these improvements  Objective: See treatment diary below      Assessment: Tolerated treatment well  Patient demonstrated fatigue post treatment  Cueing to correct elbow position and scapular engagement during bent over row  Ayaz Lynn responded well to all manuals, restrictions throughout pectoral musculature with some tingling traveling into left UE while completing stretch, sx abolished once pressure was released  Plan: Continue per plan of care           Precautions: hx of guillan-barre      Manuals 4/7 4/14 4/21 4/28 5/5    3/17 3/31   Left subscap, and infra stm PM/DL PM/DL PM/DL AF RN    DB DL   Left pec pin and stretch PM/DL PM/DL PM/DL AF  STM RN    DB DL   T/s mobs  Trial nv RN np         Shoulder mobs  Trial nv           Right  Gluteal stm PM/DL PM/DL PM/DL AF RN     DL   Neuro Re-Ed                          Supine open book             Bent over  MT 2x10 2x10 2x10 #1 2x10 2x10    2x10    Prone Y- bent over 2x10 2x10 2x10  2x10 2x10    Prone Y 2x10    Bent over ext     2x10, 2#        Bent over row     2x10, 2#        Serratus punch into flexion   2x10 BTB  2x10 BTB        Serratus ABC   2x10 2# 2 x10 2# NV 2x10, 2#        Ther Ex             pec doorway low hand             Bicep stretch (elbow bent)             Sciatic sliders supine x10 x10 np       sciatic sliders x10   Piriformis  15"x5 15"x5 ea np       pirifrmis 15"x5   Stand TB serratus punch   2x10 GTB 2x10 GTB 2x10 GTB        shld IR             Supine H abd             Supine pnf             Ther Activity Sit to stand Green 2x10 Green 2x10 nv Green 2x10 Green 2x10     gtb 2x10                Gait Training                                       Modalities

## 2021-05-06 ENCOUNTER — TELEPHONE (OUTPATIENT)
Dept: FAMILY MEDICINE CLINIC | Facility: HOSPITAL | Age: 76
End: 2021-05-06

## 2021-05-12 ENCOUNTER — OFFICE VISIT (OUTPATIENT)
Dept: PHYSICAL THERAPY | Facility: CLINIC | Age: 76
End: 2021-05-12
Payer: MEDICARE

## 2021-05-12 DIAGNOSIS — M25.512 ACUTE PAIN OF LEFT SHOULDER: Primary | ICD-10-CM

## 2021-05-12 DIAGNOSIS — M54.31 SCIATICA OF RIGHT SIDE: ICD-10-CM

## 2021-05-12 PROCEDURE — 97112 NEUROMUSCULAR REEDUCATION: CPT

## 2021-05-12 PROCEDURE — 97140 MANUAL THERAPY 1/> REGIONS: CPT

## 2021-05-12 PROCEDURE — 97110 THERAPEUTIC EXERCISES: CPT

## 2021-05-12 NOTE — PROGRESS NOTES
Daily Note     Today's date: 2021  Patient name: Joshua Zhao  : 1945  MRN: [id number]  Referring provider: Rangel Finley DO  Dx:   Encounter Diagnosis     ICD-10-CM    1  Acute pain of left shoulder  M25 512    2  Sciatica of right side  M54 31                   Subjective: pt notes that she is really starting to feel better with less pain in shoulder and sciatic  Objective: See treatment diary below      Assessment: Tolerated treatment with VC for positioning of ER to hold scap in place while performing and with bent over rows and ext to activate as the motion is being performed   In standing patient shoulders are less rounded and more equally to each other with no activation needed       Plan: Continue per plan of care           Precautions: hx of guillan-barre      Manuals        Left subscap, and infra stm PM/DL PM/DL PM/DL AF RN DL       Left pec pin and stretch PM/DL PM/DL PM/DL AF  STM RN DL       T/s mobs  Trial nv RN np         Shoulder mobs  Trial nv           Right  Gluteal stm PM/DL PM/DL PM/DL AF RN DL       Neuro Re-Ed                          Supine open book             Bent over  MT 2x10 2x10 2x10 #1 2x10 2x10        Prone Y- bent over 2x10 2x10 2x10  2x10 2x10        Bent over ext     2x10, 2# 2#  2x10       Bent over row     2x10, 2# 2#  2x10       Serratus punch into flexion   2x10 BTB  2x10 BTB Blue 2x10       Serratus ABC   2x10 2# 2 x10 2# NV 2x10, 2#        Ther Ex             pec doorway low hand             Bicep stretch (elbow bent)             Sciatic sliders supine x10 x10 np          Piriformis  15"x5 15"x5 ea np          Stand TB serratus punch   2x10 GTB 2x10 GTB 2x10 GTB        shld IR      S/l ER 2# x10       Supine H abd             Supine pnf             Ther Activity             Sit to stand Green 2x10 Green 2x10 nv Green 2x10 Green 2x10                     Gait Training                                       Modalities

## 2021-05-19 ENCOUNTER — OFFICE VISIT (OUTPATIENT)
Dept: PHYSICAL THERAPY | Facility: CLINIC | Age: 76
End: 2021-05-19
Payer: MEDICARE

## 2021-05-19 DIAGNOSIS — M25.512 ACUTE PAIN OF LEFT SHOULDER: Primary | ICD-10-CM

## 2021-05-19 DIAGNOSIS — M54.31 SCIATICA OF RIGHT SIDE: ICD-10-CM

## 2021-05-19 PROCEDURE — 97140 MANUAL THERAPY 1/> REGIONS: CPT

## 2021-05-19 PROCEDURE — 97110 THERAPEUTIC EXERCISES: CPT

## 2021-05-19 PROCEDURE — 97112 NEUROMUSCULAR REEDUCATION: CPT

## 2021-05-19 NOTE — PROGRESS NOTES
Daily Note     Today's date: 2021  Patient name: Rosa Tang  : 1945  MRN: [id number]  Referring provider: Radha Greco DO  Dx:   Encounter Diagnosis     ICD-10-CM    1  Acute pain of left shoulder  M25 512    2  Sciatica of right side  M54 31                   Subjective: Pt reports overall her L shldr and R sciatic has seen much improvement since starting therapy, and is feeling good today  Notes her R shldr has started to also bother her in the same areas  Thinks it may be from her positioning whensleeping  Objective: See treatment diary below      Assessment: Tolerated treatment well  Continued with program as outlined below  Occasional verbal cues for proper setting of L scapula with bent over exercises and sidelying ER, but was able to self correct once cueing provided  Is making good progress toward long term goals  Patient would benefit from continued PT in effort to further reduce frequency of symptoms  Plan: Continue per plan of care  Trial of decreased visits to once every other week          Precautions: hx of guillan-barre      Manuals       Left subscap, and infra stm PM/DL PM/DL PM/DL AF RN DL AFB      Left pec pin and stretch PM/DL PM/DL PM/DL AF  STM RN DL AFB      T/s mobs  Trial nv RN np         Shoulder mobs  Trial nv           Right  Gluteal stm PM/DL PM/DL PM/DL AF RN DL AFB      Neuro Re-Ed                          Supine open book             Bent over  MT 2x10 2x10 2x10 #1 2x10 2x10  2x10      Prone Y- bent over 2x10 2x10 2x10  2x10 2x10  2x10      Bent over ext     2x10, 2# 2#  2x10 2#  2x10      Bent over row     2x10, 2# 2#  2x10 2#  2x10      Serratus punch into flexion   2x10 BTB  2x10 BTB Blue 2x10 Blue 2x10      Serratus ABC   2x10 2# 2 x10 2# NV 2x10, 2#  2#  x1      Ther Ex             pec doorway low hand             Bicep stretch (elbow bent)             Sciatic sliders supine x10 x10 np          Piriformis  15"x5 15"x5 ea np          Stand TB serratus punch   2x10 GTB 2x10 GTB 2x10 GTB        shld IR      S/l ER 2# x10 S/l ER 2# x15      U/L No Money       L only RTB  x15      Supine H abd             Supine pnf             Ther Activity             Sit to stand Green 2x10 Green 2x10 nv Green 2x10 Green 2x10                     Gait Training                                       Modalities

## 2021-06-02 ENCOUNTER — OFFICE VISIT (OUTPATIENT)
Dept: PHYSICAL THERAPY | Facility: CLINIC | Age: 76
End: 2021-06-02
Payer: MEDICARE

## 2021-06-02 DIAGNOSIS — M54.31 SCIATICA OF RIGHT SIDE: ICD-10-CM

## 2021-06-02 DIAGNOSIS — M25.511 ACUTE PAIN OF RIGHT SHOULDER: Primary | ICD-10-CM

## 2021-06-02 DIAGNOSIS — M25.512 ACUTE PAIN OF LEFT SHOULDER: ICD-10-CM

## 2021-06-02 PROCEDURE — 97164 PT RE-EVAL EST PLAN CARE: CPT | Performed by: PHYSICAL THERAPIST

## 2021-06-02 NOTE — PROGRESS NOTES
PT Evaluation     Today's date: 2021  Patient name: Howard Bernal  : 1945  MRN: [id number]  Referring provider: Ton Felipe DO  Dx:   Encounter Diagnosis     ICD-10-CM    1  Acute pain of left shoulder  M25 512    2  Sciatica of right side  M54 31                   Assessment  Assessment details: Hollie Mcnamara has made great improvements with left shoulder and l/s rom and strength as well as decreased pain levels  Pt still has some pain with prolonged sitting in the right gluteal region  We issued updated exericses to performed during prolonged sitting times  Will d/c left shoulder and l/s exercises at this time  Pt today also is having acute increase in right shoulder pain after performing yard work over the weekend  Pt is having an identical pain to what she was having on the left side  Will begin PT on the right shoulder to improve strength and decrease pain to return her to a more functional premorbid condition  Impairments: abnormal muscle firing, abnormal or restricted ROM, activity intolerance, impaired physical strength, lacks appropriate home exercise program and pain with function  Understanding of Dx/Px/POC: good   Prognosis: good    Goals  All goals met for left shoulder and gluteals will now be for right shoulder  Impairment Goals  - Decrease pain to <1/10 partially met  - Improve AROM equal to contralateral side partially met  - Increase strength equal to contralateral side partially  met    Functional Goals  - Patient will be independent with comprehensive HEP  - pt will be able to sleep with out pain met  - Patient will be able to return to all yoga poses with out pain  Partially met          Plan  Patient would benefit from: skilled PT  Planned modality interventions: cryotherapy, electrical stimulation/Russian stimulation and thermotherapy: hydrocollator packs  Planned therapy interventions: joint mobilization, manual therapy, patient education, postural training, activity modification, abdominal trunk stabilization, body mechanics training, flexibility, functional ROM exercises, graded exercise, home exercise program, neuromuscular re-education, strengthening, stretching, therapeutic activities, therapeutic exercise, motor coordination training, muscle pump exercises, gait training, balance/weight bearing training, ADL training and breathing training  Frequency: 1x week  Duration in weeks: 8  Plan of Care beginning date: 2021  Plan of Care expiration date: 2021  Treatment plan discussed with: patient        Subjective Evaluation    History of Present Illness  Mechanism of injury: Pt notes that glutes on right still have pain with prolonged sitting (>2 hrs) but no functional pain  Pt notes that her left shoulder feels very good  She does not an increase in her right shoulder pain that was always at a lower level since she was doing some yard work over the weekend  the pain she is getting in the right is the same pain she was getting in the left a few months back  Quality of life: good    Pain  Current pain ratin  At best pain ratin  At worst pain ratin  Location: right  ant shoulder  Quality: dull ache and discomfort    Patient Goals  Patient goals for therapy: decreased pain, increased motion, independence with ADLs/IADLs, increased strength and return to sport/leisure activities          Objective     Concurrent Complaints  Negative for night pain, disturbed sleep, bladder dysfunction, bowel dysfunction, saddle (S4) numbness, cardiac problem, kidney problem, gallbladder problem, stomach problem, ulcer, appendix problem, spleen problem, pancreas problem, history of cancer, history of trauma and infection    Static Posture   General Observations  Symmetrical weight bearing  Shoulders  Asymmetric shoulders  Scapulae  Right anteriorly tipped and right downwardly rotated      Postural Observations  Seated posture: fair  Standing posture: fair        Observations   Left Shoulder   Negative for atrophy  Palpation   Left   No palpable tenderness to the biceps, infraspinatus, pectoralis major, subscapularis and supraspinatus  Right   No palpable tenderness to the erector spinae, external abdominal oblique, iliacus, iliopsoas, lumbar paraspinals and quadratus lumborum  Tenderness of the biceps, infraspinatus and pectoralis major  Trigger point to quadratus lumborum  Neurological Testing     Sensation     Lumbar   Left   Intact: light touch    Right   Intact: light touch    Active Range of Motion   Left Shoulder   Flexion: 160 degrees   Abduction: 160 degrees   External rotation 90°: 90 degrees     Right Shoulder   Flexion: 155 degrees with pain  Abduction: 155 degrees with pain  External rotation 90°: 90 degreeswith pain    Lumbar   Flexion:  Restriction level: minimal  Extension:  WFL  Left lateral flexion:  WFL  Right lateral flexion:  [facility]  Left rotation:  [facility]    Joint Play   Left Shoulder  Joints within functional limits are the anterior capsule  Right Shoulder  Hypomobile in the posterior capsule  Strength/Myotome Testing     Left Shoulder     Planes of Motion   Flexion: 4+   Abduction: 4+   External rotation at 0°: 4+   External rotation at 90°: 4+   Internal rotation at 0°: 4-     Isolated Muscles   Lower trapezius: 4   Middle trapezius: 4     Right Shoulder     Planes of Motion   Flexion: 4+   Abduction: 4+   External rotation at 0°: 4   External rotation at 90°: 4-   Internal rotation at 0°: 4+     Isolated Muscles   Lower trapezius: 4   Middle trapezius: 4     Left Hip   Planes of Motion   Flexion: 4    Right Hip   Planes of Motion   Flexion: 4    Left Knee   Flexion: 4  Extension: 4+    Right Knee   Flexion: 4  Extension: 4+    Left Ankle/Foot   Dorsiflexion: 4+  Great toe extension: 4+    Right Ankle/Foot   Dorsiflexion: 4+  Great toe extension: 4+    Tests     Left Shoulder   Negative Hawkin's, Neer's, painful arc and passive horizontal adduction  Right Shoulder   Positive Hawkin's, Neer's and painful arc  Lumbar   Negative prone instability        Right   Negative slump test                            Precautions: hx of guillan-barre      Manuals 4/7 4/14 4/21 4/28 5/5 5/12 5/19 6/2     Left subscap, and infra stm PM/DL PM/DL PM/DL AF RN DL AFB Right subscap and pectoral DB     Left pec pin and stretch PM/DL PM/DL PM/DL AF  STM RN DL AFB nv on right     T/s mobs  Trial nv RN np         Shoulder mobs  Trial nv           Right  Gluteal stm PM/DL PM/DL PM/DL AF RN DL AFB D/c     Neuro Re-Ed                          Supine open book             Bent over  MT 2x10 2x10 2x10 #1 2x10 2x10  2x10 1# nv     Prone Y- bent over 2x10 2x10 2x10  2x10 2x10  2x10 1# nv     Bent over ext     2x10, 2# 2#  2x10 2#  2x10      Bent over row     2x10, 2# 2#  2x10 2#  2x10      Serratus punch into flexion   2x10 BTB  2x10 BTB Blue 2x10 Blue 2x10 hold     Serratus ABC   2x10 2# 2 x10 2# NV 2x10, 2#  2#  x1 nv     Ther Ex             shld rows 45 deg        Blue 2x10     Shoulder ext        nv     Foam roller migdalia wings        nv     Piriformis  15"x5 15"x5 ea np          Stand TB serratus punch   2x10 GTB 2x10 GTB 2x10 GTB        shld IR      S/l ER 2# x10 S/l ER 2# x15      U/L No Money       L only RTB  x15      Supine H abd             Supine pnf             Ther Activity             Sit to stand Green 2x10 Green 2x10 nv Green 2x10 Green 2x10                     Gait Training                                       Modalities

## 2021-06-16 ENCOUNTER — OFFICE VISIT (OUTPATIENT)
Dept: PHYSICAL THERAPY | Facility: CLINIC | Age: 76
End: 2021-06-16
Payer: MEDICARE

## 2021-06-16 DIAGNOSIS — M25.511 ACUTE PAIN OF RIGHT SHOULDER: Primary | ICD-10-CM

## 2021-06-16 PROCEDURE — 97110 THERAPEUTIC EXERCISES: CPT | Performed by: PHYSICAL THERAPIST

## 2021-06-16 PROCEDURE — 97140 MANUAL THERAPY 1/> REGIONS: CPT | Performed by: PHYSICAL THERAPIST

## 2021-07-07 ENCOUNTER — OFFICE VISIT (OUTPATIENT)
Dept: PHYSICAL THERAPY | Facility: CLINIC | Age: 76
End: 2021-07-07
Payer: MEDICARE

## 2021-07-07 DIAGNOSIS — M54.31 SCIATICA OF RIGHT SIDE: ICD-10-CM

## 2021-07-07 DIAGNOSIS — M25.512 ACUTE PAIN OF LEFT SHOULDER: ICD-10-CM

## 2021-07-07 DIAGNOSIS — M25.511 ACUTE PAIN OF RIGHT SHOULDER: Primary | ICD-10-CM

## 2021-07-07 PROCEDURE — 97110 THERAPEUTIC EXERCISES: CPT | Performed by: PHYSICAL THERAPIST

## 2021-07-07 NOTE — PROGRESS NOTES
Daily Note     Today's date: 2021  Patient name: Viki Muniz  : 1945  MRN: [id number]  Referring provider: Leobardo Lane DO  Dx:   Encounter Diagnosis     ICD-10-CM    1  Acute pain of right shoulder  M25 511    2  Acute pain of left shoulder  M25 512    3  Sciatica of right side  M54 31                   Subjective: Pt notes that since taking a 2 week rest and then returning to exercise her shoulders feel much better  She notes that she thinks she is now good to go to Hep only at this time  Objective: See treatment diary below      Assessment: pt presents with no pain, is independent with HEP d/c to HEP at this time  Plan: Continue per plan of care                   Precautions: hx of guillan-barre      Manuals  6   Left subscap, and infra stm PM/DL PM/DL PM/DL AF RN DL AFB Right subscap and pectoral DB DB    Left pec pin and stretch PM/DL PM/DL PM/DL AF  STM RN DL AFB nv on right DB    T/s mobs  Trial nv RN np         Shoulder mobs  Trial nv           Right  Gluteal stm PM/DL PM/DL PM/DL AF RN DL AFB D/c     Neuro Re-Ed                          Supine open book             Bent over  MT 2x10 2x10 2x10 #1 2x10 2x10  2x10 1# nv 1# 2x10    Prone Y- bent over 2x10 2x10 2x10  2x10 2x10  2x10 1# nv 1# 2x10     Bent over ext     2x10, 2# 2#  2x10 2#  2x10      Bent over row     2x10, 2# 2#  2x10 2#  2x10      Serratus punch into flexion   2x10 BTB  2x10 BTB Blue 2x10 Blue 2x10 hold     Serratus ABC   2x10 2# 2 x10 2# NV 2x10, 2#  2#  x1 nv     Ther Ex             shld rows 45 deg        Blue 2x10  Blue 2x10   Shoulder ext        nv     Foam roller migdalia wings        nv     Piriformis  15"x5 15"x5 ea np          Stand TB serratus punch   2x10 GTB 2x10 GTB 2x10 GTB        shld IR      S/l ER 2# x10 S/l ER 2# x15   Stand Er red 2x10 ea   U/L No Money       L only RTB  x15      Sleeper stretch         15''x5 15''x5   Mid back rot stretch         15''x5 ea 15''x5   H abd stretch         15''x5 ea 15''x5   Ther Activity             Sit to stand Green 2x10 Green 2x10 nv Green 2x10 Green 2x10                     Gait Training                                       Modalities

## 2021-07-14 ENCOUNTER — OFFICE VISIT (OUTPATIENT)
Dept: FAMILY MEDICINE CLINIC | Facility: HOSPITAL | Age: 76
End: 2021-07-14
Payer: MEDICARE

## 2021-07-14 ENCOUNTER — HOSPITAL ENCOUNTER (OUTPATIENT)
Dept: RADIOLOGY | Facility: HOSPITAL | Age: 76
Discharge: HOME | End: 2021-07-14
Payer: MEDICARE

## 2021-07-14 VITALS
OXYGEN SATURATION: 100 % | WEIGHT: 119.8 LBS | HEART RATE: 98 BPM | BODY MASS INDEX: 21.23 KG/M2 | DIASTOLIC BLOOD PRESSURE: 80 MMHG | SYSTOLIC BLOOD PRESSURE: 124 MMHG | TEMPERATURE: 97.6 F | HEIGHT: 63 IN

## 2021-07-14 DIAGNOSIS — S99.921A INJURY OF RIGHT FOOT, INITIAL ENCOUNTER: ICD-10-CM

## 2021-07-14 DIAGNOSIS — M79.671 RIGHT FOOT PAIN: Primary | ICD-10-CM

## 2021-07-14 DIAGNOSIS — M79.671 RIGHT FOOT PAIN: ICD-10-CM

## 2021-07-14 PROCEDURE — 99213 OFFICE O/P EST LOW 20 MIN: CPT | Performed by: NURSE PRACTITIONER

## 2021-07-14 PROCEDURE — 73630 X-RAY EXAM OF FOOT: CPT

## 2021-07-14 NOTE — PROGRESS NOTES
Assessment/Plan:     Given bony tenderness check xray  Ice and elevation otherwise  Diagnoses and all orders for this visit:    Right foot pain  -     XR foot 3+ vw right; Future    Injury of right foot, initial encounter  -     XR foot 3+ vw right; Future          Subjective:     Patient ID: Judy Rivera is a 78 y o  female  4 days ago jammed right last 2 toes on edge of door  Mild bruising that has gone away  Swelling  Able to bear weight  Has osteopenia  Had to stop calcium due to elevated serum levels  Review of Systems   Musculoskeletal: Positive for arthralgias (right foot) and joint swelling (right foot)  The following portions of the patient's history were reviewed and updated as appropriate: allergies, current medications, past family history, past medical history, past social history, past surgical history and problem list     Objective:  Vitals:    07/14/21 1021   BP: 124/80   Pulse: 98   Temp: 97 6 °F (36 4 °C)   SpO2: 100%      Physical Exam  Vitals reviewed  Constitutional:       Appearance: Normal appearance  Cardiovascular:      Rate and Rhythm: Normal rate and regular rhythm  Heart sounds: Normal heart sounds  Pulmonary:      Effort: Pulmonary effort is normal       Breath sounds: Normal breath sounds  Musculoskeletal:      Right foot: Normal capillary refill  Deformity (3rd toe), tenderness and bony tenderness (over 4th MTP joint and 5th metatarsal) present  Normal pulse  Neurological:      Mental Status: She is alert and oriented to person, place, and time  Psychiatric:         Mood and Affect: Mood normal          Behavior: Behavior normal          Thought Content:  Thought content normal          Judgment: Judgment normal

## 2021-09-24 ENCOUNTER — IMMUNIZATIONS (OUTPATIENT)
Dept: FAMILY MEDICINE CLINIC | Facility: HOSPITAL | Age: 76
End: 2021-09-24
Payer: MEDICARE

## 2021-09-24 DIAGNOSIS — Z23 ENCOUNTER FOR IMMUNIZATION: Primary | ICD-10-CM

## 2021-09-24 PROCEDURE — G0008 ADMIN INFLUENZA VIRUS VAC: HCPCS

## 2021-09-24 PROCEDURE — 90662 IIV NO PRSV INCREASED AG IM: CPT

## 2022-01-20 ENCOUNTER — TELEPHONE (OUTPATIENT)
Dept: OBGYN CLINIC | Facility: CLINIC | Age: 77
End: 2022-01-20

## 2022-01-28 NOTE — PROGRESS NOTES
Daily Note     Today's date: 2021  Patient name: Rubi Everett  : 1945  MRN: [id number]  Referring provider: Sandra Kimball DO  Dx:   Encounter Diagnosis     ICD-10-CM    1  Acute pain of left shoulder  M25 512                   Subjective: pt states that she is feeling better with less overall pain states that she still gets some pain anterior shoulder and bicep      Objective: See treatment diary below      Assessment: Tolerated treatment with less TTP over pec but did have more tightness in UT  Patient  Was given bent over rows and ext with VC to avoid UT compensation  Pt verbalizes understanding  Plan: Continue per plan of care        Precautions: hx of guillan-barre      Manuals 1/20 1/27 2/8 2/10 2/17 2/24       Left subscap, and infra stm DB DB + bicep DB DL DL DL       Left pec pin and stretch  DB DB DL DL DL                                 Neuro Re-Ed             S/l Er 1# 2x10            Supine open book x12 reviewed performance, and proper form 3'           Prone MT 2x10            Bent over row      x20       Bent over ext      x10                                 Ther Ex             pec doorway low hand 15''x3    15''x3     reviewed         Bicep stretch (elbow bent)  3''x10           Shoulder row  blk 2x10           shld ext  Blue 2x10           shld Er  Red 2x10 (to neutral) bilateral green 2x10 reviewed         shld IR  Red 2x10           Supine H abd   gtb 2x10 reviewed         Supine pnf   gtb 2x10 reviewed         Ther Activity                                       Gait Training                                       Modalities 1

## 2022-02-01 ENCOUNTER — HOSPITAL ENCOUNTER (OUTPATIENT)
Dept: MAMMOGRAPHY | Facility: CLINIC | Age: 77
Discharge: HOME | End: 2022-02-01
Payer: MEDICARE

## 2022-02-01 VITALS — BODY MASS INDEX: 21.26 KG/M2 | WEIGHT: 120 LBS | HEIGHT: 63 IN

## 2022-02-01 DIAGNOSIS — Z12.31 ENCOUNTER FOR SCREENING MAMMOGRAM FOR BREAST CANCER: ICD-10-CM

## 2022-02-01 PROCEDURE — 77063 BREAST TOMOSYNTHESIS BI: CPT

## 2022-02-01 PROCEDURE — 77067 SCR MAMMO BI INCL CAD: CPT

## 2022-02-07 ENCOUNTER — OFFICE VISIT (OUTPATIENT)
Dept: OBGYN CLINIC | Facility: CLINIC | Age: 77
End: 2022-02-07
Payer: MEDICARE

## 2022-02-07 VITALS
SYSTOLIC BLOOD PRESSURE: 122 MMHG | WEIGHT: 117.6 LBS | BODY MASS INDEX: 20.84 KG/M2 | DIASTOLIC BLOOD PRESSURE: 68 MMHG | HEIGHT: 63 IN

## 2022-02-07 DIAGNOSIS — M85.852 OSTEOPENIA OF NECK OF LEFT FEMUR: ICD-10-CM

## 2022-02-07 DIAGNOSIS — Z12.31 ENCOUNTER FOR SCREENING MAMMOGRAM FOR BREAST CANCER: ICD-10-CM

## 2022-02-07 DIAGNOSIS — N95.2 VAGINAL ATROPHY: Primary | ICD-10-CM

## 2022-02-07 PROCEDURE — 99213 OFFICE O/P EST LOW 20 MIN: CPT | Performed by: OBSTETRICS & GYNECOLOGY

## 2022-02-07 RX ORDER — ESTRADIOL 10 UG/1
1 INSERT VAGINAL 2 TIMES WEEKLY
Qty: 24 TABLET | Refills: 3 | Status: SHIPPED | OUTPATIENT
Start: 2022-02-07

## 2022-02-07 RX ORDER — LATANOPROST 50 UG/ML
SOLUTION/ DROPS OPHTHALMIC
COMMUNITY
Start: 2021-12-19

## 2022-02-07 NOTE — PROGRESS NOTES
Assessment/Plan:    She does not require a Pap    mammogram reviewed with her including breast density  RX given for next year     Discussed self breast exams    colon cancer screening-colonoscopy is up to date    Mild osteopenia- reviewed this in detail, discussed Ca and Vit D, weight bearing and muscle strengthening exercises  DEXA ordered    Difficulty achieving orgasm - this does not occur every time  Discussed the vaginal estrogen and lubricant  Stress may be exacerbating this  We discussed meeting with a sex therapist if she would like  Vaginal atrophy - RX vagifem, this is working well for her  Stable urethral caruncle, small    discussed preventive care, regular exercise and a healthy diet      No problem-specific Assessment & Plan notes found for this encounter  Diagnoses and all orders for this visit:    Vaginal atrophy  -     estradiol (Yuvafem) 10 MCG TABS vaginal tablet; Insert 1 tablet (10 mcg total) into the vagina 2 (two) times a week At bedtime    Encounter for screening mammogram for breast cancer  -     Mammo screening bilateral w 3d & cad; Future    Osteopenia of neck of left femur  -     DXA bone density spine hip and pelvis; Future    Other orders  -     latanoprost (XALATAN) 0 005 % ophthalmic solution; INSTILL 1 DROP INTO BOTH EYES AT BEDTIME          Subjective:      Patient ID: Sage Pope is a 78 y o  female  Pt here for follow up for vaginal estrogen  She is on Yuvafem  This is working well for her  At times, she has difficulty achieving orgasm  She does use a lubricant  She has been under a great deal of stress recently with her Religious  Normal 3D mammogram last week showed scattered fibroglandular densities and average risk  Mild osteopenia in the femoral neck noted in DEXA from January 2020         The following portions of the patient's history were reviewed and updated as appropriate: allergies, current medications, past family history, past medical history, past social history, past surgical history and problem list     Review of Systems   Constitutional: Negative  Gastrointestinal: Negative  Genitourinary: Negative  Difficulty with orgasm         Objective:      /68   Ht 5' 3" (1 6 m)   Wt 53 3 kg (117 lb 9 6 oz)   LMP  (LMP Unknown)   BMI 20 83 kg/m²          Physical Exam  Vitals reviewed  Constitutional:       Appearance: She is well-developed  Neck:      Thyroid: No thyromegaly  Trachea: No tracheal deviation  Cardiovascular:      Rate and Rhythm: Normal rate and regular rhythm  Pulmonary:      Effort: Pulmonary effort is normal       Breath sounds: Normal breath sounds  Chest:   Breasts: Breasts are symmetrical       Right: No inverted nipple, mass, nipple discharge, skin change or tenderness  Left: No inverted nipple, mass, nipple discharge, skin change or tenderness  Abdominal:      General: There is no distension  Palpations: Abdomen is soft  There is no mass  Tenderness: There is no abdominal tenderness  Genitourinary:     Labia:         Right: No rash, tenderness, lesion or injury  Left: No rash, tenderness, lesion or injury  Vagina: Normal       Cervix: No cervical motion tenderness, discharge or friability  Adnexa:         Right: No mass, tenderness or fullness  Left: No mass, tenderness or fullness          Rectum: Normal       Comments: Small urethral caruncle, unchanged

## 2022-02-14 ENCOUNTER — HOSPITAL ENCOUNTER (OUTPATIENT)
Dept: BONE DENSITY | Facility: CLINIC | Age: 77
Discharge: HOME | End: 2022-02-14
Payer: MEDICARE

## 2022-02-14 DIAGNOSIS — M85.852 OSTEOPENIA OF NECK OF LEFT FEMUR: ICD-10-CM

## 2022-02-14 PROCEDURE — 77080 DXA BONE DENSITY AXIAL: CPT

## 2022-02-21 ENCOUNTER — TELEPHONE (OUTPATIENT)
Dept: FAMILY MEDICINE CLINIC | Facility: HOSPITAL | Age: 77
End: 2022-02-21

## 2022-02-22 ENCOUNTER — OFFICE VISIT (OUTPATIENT)
Dept: FAMILY MEDICINE CLINIC | Facility: HOSPITAL | Age: 77
End: 2022-02-22
Payer: MEDICARE

## 2022-02-22 VITALS
TEMPERATURE: 97.7 F | WEIGHT: 118.6 LBS | SYSTOLIC BLOOD PRESSURE: 128 MMHG | HEART RATE: 94 BPM | HEIGHT: 63 IN | DIASTOLIC BLOOD PRESSURE: 84 MMHG | BODY MASS INDEX: 21.02 KG/M2

## 2022-02-22 DIAGNOSIS — R03.0 ELEVATED BLOOD PRESSURE READING IN OFFICE WITHOUT DIAGNOSIS OF HYPERTENSION: ICD-10-CM

## 2022-02-22 DIAGNOSIS — G47.00 INSOMNIA, UNSPECIFIED TYPE: ICD-10-CM

## 2022-02-22 DIAGNOSIS — Z00.00 MEDICARE ANNUAL WELLNESS VISIT, SUBSEQUENT: ICD-10-CM

## 2022-02-22 DIAGNOSIS — R21 RASH: Primary | ICD-10-CM

## 2022-02-22 DIAGNOSIS — F51.01 PRIMARY INSOMNIA: ICD-10-CM

## 2022-02-22 PROCEDURE — 99214 OFFICE O/P EST MOD 30 MIN: CPT | Performed by: INTERNAL MEDICINE

## 2022-02-22 PROCEDURE — G0439 PPPS, SUBSEQ VISIT: HCPCS | Performed by: INTERNAL MEDICINE

## 2022-02-22 RX ORDER — ZOLPIDEM TARTRATE 5 MG/1
TABLET ORAL
Qty: 90 TABLET | Refills: 0 | Status: SHIPPED | OUTPATIENT
Start: 2022-02-22

## 2022-02-22 NOTE — ASSESSMENT & PLAN NOTE
Improved, only prn use of Ambien, no SE when rx is used, PDMP Rx website reviewed and rx refilled upon request after no red flags were noted

## 2022-02-22 NOTE — PROGRESS NOTES
Assessment and Plan:     Problem List Items Addressed This Visit     None      Visit Diagnoses     Rash    -  Primary    Elevated blood pressure reading in office without diagnosis of hypertension        Medicare annual wellness visit, subsequent              Depression Screening and Follow-up Plan: Patient was screened for depression during today's encounter  They screened negative with a PHQ-2 score of 1  Preventive health issues were discussed with patient, and age appropriate screening tests were ordered as noted in patient's After Visit Summary  Personalized health advice and appropriate referrals for health education or preventive services given if needed, as noted in patient's After Visit Summary       History of Present Illness:     Patient presents for Medicare Annual Wellness visit    Patient Care Team:  Chadwick Andersen DO as PCP - General  DO Fatuma Oseguera DO (Dermatology)     Problem List:     Patient Active Problem List   Diagnosis    Atrophy of vagina    Cataracta    Osteopenia    Macrocytosis    Insomnia    Squamous cell skin cancer    Transaminitis      Past Medical and Surgical History:     Past Medical History:   Diagnosis Date    Breast asymmetry     Resolved: Nov 9, 2017    Causalgia of upper limb     last assessed: Jan 29, 2014    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[facility])     Varicose veins with pain, unspecified laterality     Resolved: June 6, 2017    Vasovagal syncope     last assessed: Dec 30, 2013     Past Surgical History:   Procedure Laterality Date    BREAST BIOPSY      unsure laterality or date of biopsy    CATARACT EXTRACTION, BILATERAL Bilateral 2018 january and april    COLONOSCOPY      Complete - [facility] 2/13/13 - Hemorrhoids, repeat 5 years d/t family h/o colon cancer     FOOT SURGERY      MOUTH SURGERY      Tooth Extraction - last assessed: Aug 19, 2014   Blayne Davila MOUTH SURGERY N/A 11/2017    SKIN BIOPSY Right 07/2018    right thigh    TONSILLECTOMY last assessed: Aug 19, 2014      Family History:     Family History   Problem Relation Age of Onset    Colon cancer Mother         diagnosed in her 45s and 66's    Endometrial cancer Mother         63's    Lung cancer Father 80    Colon cancer Brother 64    No Known Problems Sister     No Known Problems Maternal Grandmother     No Known Problems Maternal Grandfather     No Known Problems Paternal Grandmother     No Known Problems Paternal Grandfather     Colon cancer Maternal Aunt         unknown age of onset    No Known Problems Maternal Aunt     No Known Problems Paternal Aunt     No Known Problems Paternal Aunt     No Known Problems Paternal Aunt     No Known Problems Paternal Aunt     No Known Problems Paternal Aunt       Social History:     Social History     Socioeconomic History    Marital status: /Civil Union     Spouse name: None    Number of children: 2    Years of education: None    Highest education level: None   Occupational History    None   Tobacco Use    Smoking status: Never Smoker    Smokeless tobacco: Never Used   Vaping Use    Vaping Use: Never used   Substance and Sexual Activity    Alcohol use:  Yes     Alcohol/week: 5 0 - 6 0 standard drinks     Types: 5 - 6 Glasses of wine per week     Comment: 1 per day - social/ drinks wine     Drug use: No    Sexual activity: Yes     Partners: Male   Other Topics Concern    None   Social History Narrative    Always uses seat belt     Caffeine use     [facility]      Social Determinants of Health     Financial Resource Strain: Not on file   Food Insecurity: Not on file   Transportation Needs: Not on file   Physical Activity: Not on file   Stress: Not on file   Social Connections: Not on file   Intimate Partner Violence: Not on file   Housing Stability: Not on file      Medications and Allergies:     Current Outpatient Medications   Medication Sig Dispense Refill    betamethasone, augmented, (DIPROLENE) 0 05 % ointment APPLY TO RASH ON UPPER/ LOWER EXTREMITIES TWICE DAILY X10-14 DAYS (DO NOT USE ON FACE OR BODY FOLDS)  1    estradiol (Yuvafem) 10 MCG TABS vaginal tablet Insert 1 tablet (10 mcg total) into the vagina 2 (two) times a week At bedtime 24 tablet 3    latanoprost (XALATAN) 0 005 % ophthalmic solution INSTILL 1 DROP INTO BOTH EYES AT BEDTIME      zolpidem (AMBIEN) 5 mg tablet Take   5-1 tab as needed for sleep 90 tablet 0     No current facility-administered medications for this visit  No Known Allergies   Immunizations:     Immunization History   Administered Date(s) Administered    COVID-19 MODERNA VACC 0 5 ML IM 02/05/2021, 03/03/2021    H1N1, All Formulations 11/16/2009    INFLUENZA 11/17/2017, 11/12/2018    Influenza Split High Dose Preservative Free IM 11/17/2017    Influenza, high dose seasonal 0 7 mL 11/12/2018, 11/20/2019, 10/14/2020, 09/24/2021    Pneumococcal Conjugate 13-Valent 12/17/2019    Pneumococcal Polysaccharide PPV23 01/19/2021      Health Maintenance:         Topic Date Due    Colorectal Cancer Screening  01/22/2023    Breast Cancer Screening: Mammogram  02/01/2023    DXA SCAN  02/14/2024    Hepatitis C Screening  Completed         Topic Date Due    DTaP,Tdap,and Td Vaccines (1 - Tdap) Never done    COVID-19 Vaccine (3 - Booster for Moderna series) 08/03/2021      Medicare Health Risk Assessment:     /84   Pulse 94   Temp 97 7 °F (36 5 °C) (Tympanic)   Ht 5' 3" (1 6 m)   Wt 53 8 kg (118 lb 9 6 oz)   LMP  (LMP Unknown)   BMI 21 01 kg/m²      Jossy Rodgers is here for her Subsequent Wellness visit  Last Medicare Wellness visit information reviewed, patient interviewed and updates made to the record today  Health Risk Assessment:   Patient rates overall health as excellent  Patient feels that their physical health rating is same  Patient is very satisfied with their life  Eyesight was rated as same  Hearing was rated as slightly worse   Patient feels that their emotional and mental health rating is same  Patients states they are never, rarely angry  Patient states they are never, rarely unusually tired/fatigued  Pain experienced in the last 7 days has been some  Patient's pain rating has been 2/10  Patient states that she has experienced no weight loss or gain in last 6 months  Hearing worse - notes problems "catching words" every now and then, she does not feel it is significant enough to need further eval    Depression Screening:   PHQ-2 Score: 1      Fall Risk Screening: In the past year, patient has experienced: no history of falling in past year      Urinary Incontinence Screening:   Patient has leaked urine accidently in the last six months  Has some leakage of urine w/o awareness - very scant    Home Safety:  Patient does not have trouble with stairs inside or outside of their home  Patient has working smoke alarms and has no working carbon monoxide detector  Nutrition:   Current diet is Regular  Medications:   Patient is not currently taking any over-the-counter supplements  Patient is able to manage medications  Activities of Daily Living (ADLs)/Instrumental Activities of Daily Living (IADLs):   Walk and transfer into and out of bed and chair?: Yes  Dress and groom yourself?: Yes    Bathe or shower yourself?: Yes    Feed yourself? Yes  Do your laundry/housekeeping?: Yes  Manage your money, pay your bills and track your expenses?: Yes  Make your own meals?: Yes    Do your own shopping?: Yes    Previous Hospitalizations:   Any hospitalizations or ED visits within the last 12 months?: No      Advance Care Planning:   Living will: Yes    Durable POA for healthcare:  Yes    Advanced directive: Yes      Cognitive Screening:   Provider or family/friend/caregiver concerned regarding cognition?: No    PREVENTIVE SCREENINGS      Cardiovascular Screening:    General: Screening Current and Risks and Benefits Discussed      Diabetes Screening:     General: Screening Current and Risks and Benefits Discussed      Colorectal Cancer Screening:     General: Screening Current      Breast Cancer Screening:     General: Screening Current and Risks and Benefits Discussed      Cervical Cancer Screening:    General: Screening Not Indicated and Risks and Benefits Discussed      Osteoporosis Screening:    General: Screening Current and Risks and Benefits Discussed      Abdominal Aortic Aneurysm (AAA) Screening:        General: Risks and Benefits Discussed and Screening Not Indicated      Lung Cancer Screening:     General: Screening Not Indicated and Risks and Benefits Discussed      Hepatitis C Screening:    General: Screening Current and Risks and Benefits Discussed    Screening, Brief Intervention, and Referral to Treatment (SBIRT)    Screening  Typical number of drinks in a day: 1  Typical number of drinks in a week: 6  Interpretation: Low risk drinking behavior  Single Item Drug Screening:  How often have you used an illegal drug (including marijuana) or a prescription medication for non-medical reasons in the past year? never    Single Item Drug Screen Score: 0  Interpretation: Negative screen for possible drug use disorder    Other Counseling Topics:   Car/seat belt/driving safety and regular weightbearing exercise         Gaurav Vazquez, DO

## 2022-02-22 NOTE — PROGRESS NOTES
Assessment/Plan:    Insomnia  Improved, only prn use of Ambien, no SE when rx is used, PDMP Rx website reviewed and rx refilled upon request after no red flags were noted       Diagnoses and all orders for this visit:    Rash  Comments:  Looks like a unspecified dermatitis with surrounding dry skin and excoriations, reassured using her steroid cream bid x 10 days was fine, stressed importance of avoiding scratching and keeping skin hydrated with OTC moisturizing lotion, call if no better OR if rash spreads    Elevated blood pressure reading in office without diagnosis of hypertension  Comments:  BP better by end of appt, con't to monitor yearly off meds, call with any s/sx of elevated BP    Primary insomnia  -     zolpidem (AMBIEN) 5 mg tablet; Take   5-1 tab as needed for sleep    Insomnia, unspecified type    Medicare annual wellness visit, subsequent      Colonoscopy 1/18 - 5  Yrs    Mammo 2/22    Dexa 2/22 - osteopenia    BW 1/21 and 4/21  FLP 1/20    Subjective:      Patient ID: Burleigh Cabot is a 78 y o  female  HPI Pt here for rash and AWV    Rash started on L lateral abd wall about 2 mos ago  Rash started as a small isolated lesion that itched  And then yesterday she noted it was a bit uncomfortable at the area and seemed to have spread a bit from the isolated lesion  She notes no F/C/new medications/new soaps or lotions or detergents  She has tried Betamethasone cream she had at home yesterday but want wanted to make sure it was OK to use on the rash     Requesting refill on Ambien  Not using nightly and when uses is using a 1/2 tab or even less  No SE with the rx noted  Review of Systems   Constitutional: Negative for chills and fever  HENT: Positive for hearing loss  Negative for congestion  Eyes: Negative for pain and visual disturbance  Respiratory: Negative for cough and shortness of breath  Cardiovascular: Negative for chest pain, palpitations and leg swelling  Gastrointestinal: Negative for abdominal pain, blood in stool, constipation, diarrhea, nausea and vomiting  Genitourinary: Negative for difficulty urinating, dysuria, vaginal bleeding and vaginal pain  Musculoskeletal: Negative for back pain and neck pain  Skin: Negative for rash and wound  Neurological: Negative for dizziness, light-headedness and headaches  Hematological: Does not bruise/bleed easily  Psychiatric/Behavioral: Negative for behavioral problems, confusion, dysphoric mood and sleep disturbance  Objective:    /84   Pulse 94   Temp 97 7 °F (36 5 °C) (Tympanic)   Ht 5' 3" (1 6 m)   Wt 53 8 kg (118 lb 9 6 oz)   LMP  (LMP Unknown)   BMI 21 01 kg/m²      Physical Exam  Vitals and nursing note reviewed  Constitutional:       General: She is not in acute distress  Appearance: She is well-developed  She is not ill-appearing  HENT:      Head: Normocephalic and atraumatic  Right Ear: Tympanic membrane and external ear normal  There is no impacted cerumen  Left Ear: Tympanic membrane and external ear normal  There is no impacted cerumen  Eyes:      General:         Right eye: No discharge  Left eye: No discharge  Conjunctiva/sclera: Conjunctivae normal    Neck:      Vascular: No carotid bruit  Trachea: No tracheal deviation  Cardiovascular:      Rate and Rhythm: Normal rate and regular rhythm  Heart sounds: Normal heart sounds  No murmur heard  No friction rub  Pulmonary:      Effort: Pulmonary effort is normal  No respiratory distress  Breath sounds: Normal breath sounds  No wheezing, rhonchi or rales  Abdominal:      General: There is no distension  Palpations: Abdomen is soft  Tenderness: There is no abdominal tenderness  There is no guarding or rebound  Musculoskeletal:         General: No deformity or signs of injury  Cervical back: Neck supple  Skin:     General: Skin is warm        Coloration: Skin is not pale  Findings: Rash present  Comments: L mid lateral abd wall with ill defined scabbed light pink macules, no papules/pustules/drainage/warmth/tenderness noted, some excoriations and dry skin were present   Neurological:      General: No focal deficit present  Mental Status: She is alert  Mental status is at baseline  Motor: No abnormal muscle tone  Gait: Gait normal    Psychiatric:         Mood and Affect: Mood normal          Behavior: Behavior normal          Thought Content:  Thought content normal          Judgment: Judgment normal

## 2022-02-22 NOTE — PATIENT INSTRUCTIONS
Medicare Preventive Visit Patient Instructions  Thank you for completing your Welcome to Medicare Visit or Medicare Annual Wellness Visit today  Your next wellness visit will be due in one year (2/23/2023)  The screening/preventive services that you may require over the next 5-10 years are detailed below  Some tests may not apply to you based off risk factors and/or age  Screening tests ordered at today's visit but not completed yet may show as past due  Also, please note that scanned in results may not display below  Preventive Screenings:  Service Recommendations Previous Testing/Comments   Colorectal Cancer Screening  * Colonoscopy    * Fecal Occult Blood Test (FOBT)/Fecal Immunochemical Test (FIT)  * Fecal DNA/Cologuard Test  * Flexible Sigmoidoscopy Age: 54-65 years old   Colonoscopy: every 10 years (may be performed more frequently if at higher risk)  OR  FOBT/FIT: every 1 year  OR  Cologuard: every 3 years  OR  Sigmoidoscopy: every 5 years  Screening may be recommended earlier than age 48 if at higher risk for colorectal cancer  Also, an individualized decision between you and your healthcare provider will decide whether screening between the ages of 74-80 would be appropriate  Colonoscopy: 01/22/2018  FOBT/FIT: Not on file  Cologuard: Not on file  Sigmoidoscopy: Not on file    Screening Current     Breast Cancer Screening Age: 36 years old  Frequency: every 1-2 years  Not required if history of left and right mastectomy Mammogram: 02/01/2022    Screening Current   Cervical Cancer Screening Between the ages of 21-29, pap smear recommended once every 3 years  Between the ages of 33-67, can perform pap smear with HPV co-testing every 5 years     Recommendations may differ for women with a history of total hysterectomy, cervical cancer, or abnormal pap smears in past  Pap Smear: 01/18/2021    Screening Not Indicated   Hepatitis C Screening Once for adults born between 1945 and 1965  More frequently in patients at high risk for Hepatitis C Hep C Antibody: 01/07/2020    Screening Current   Diabetes Screening 1-2 times per year if you're at risk for diabetes or have pre-diabetes Fasting glucose: 84 mg/dL   A1C: No results in last 5 years    Screening Current   Cholesterol Screening Once every 5 years if you don't have a lipid disorder  May order more often based on risk factors  Lipid panel: 01/07/2020    Screening Current     Other Preventive Screenings Covered by Medicare:  1  Abdominal Aortic Aneurysm (AAA) Screening: covered once if your at risk  You're considered to be at risk if you have a family history of AAA  2  Lung Cancer Screening: covers low dose CT scan once per year if you meet all of the following conditions: (1) Age 50-69; (2) No signs or symptoms of lung cancer; (3) Current smoker or have quit smoking within the last 15 years; (4) You have a tobacco smoking history of at least 30 pack years (packs per day multiplied by number of years you smoked); (5) You get a written order from a healthcare provider  3  Glaucoma Screening: covered annually if you're considered high risk: (1) You have diabetes OR (2) Family history of glaucoma OR (3)  aged 48 and older OR (3)  American aged 72 and older  3  Osteoporosis Screening: covered every 2 years if you meet one of the following conditions: (1) You're estrogen deficient and at risk for osteoporosis based off medical history and other findings; (2) Have a vertebral abnormality; (3) On glucocorticoid therapy for more than 3 months; (4) Have primary hyperparathyroidism; (5) On osteoporosis medications and need to assess response to drug therapy  · Last bone density test (DXA Scan): 02/14/2022   5  HIV Screening: covered annually if you're between the age of 15-65  Also covered annually if you are younger than 13 and older than 72 with risk factors for HIV infection   For pregnant patients, it is covered up to 3 times per pregnancy  Immunizations:  Immunization Recommendations   Influenza Vaccine Annual influenza vaccination during flu season is recommended for all persons aged >= 6 months who do not have contraindications   Pneumococcal Vaccine (Prevnar and Pneumovax)  * Prevnar = PCV13  * Pneumovax = PPSV23   Adults 25-60 years old: 1-3 doses may be recommended based on certain risk factors  Adults 72 years old: Prevnar (PCV13) vaccine recommended followed by Pneumovax (PPSV23) vaccine  If already received PPSV23 since turning 65, then PCV13 recommended at least one year after PPSV23 dose  Hepatitis B Vaccine 3 dose series if at intermediate or high risk (ex: diabetes, end stage renal disease, liver disease)   Tetanus (Td) Vaccine - COST NOT COVERED BY MEDICARE PART B Following completion of primary series, a booster dose should be given every 10 years to maintain immunity against tetanus  Td may also be given as tetanus wound prophylaxis  Tdap Vaccine - COST NOT COVERED BY MEDICARE PART B Recommended at least once for all adults  For pregnant patients, recommended with each pregnancy  Shingles Vaccine (Shingrix) - COST NOT COVERED BY MEDICARE PART B  2 shot series recommended in those aged 48 and above     Health Maintenance Due:      Topic Date Due    Colorectal Cancer Screening  01/22/2023    Breast Cancer Screening: Mammogram  02/01/2023    DXA SCAN  02/14/2024    Hepatitis C Screening  Completed     Immunizations Due:      Topic Date Due    DTaP,Tdap,and Td Vaccines (1 - Tdap) Never done    COVID-19 Vaccine (3 - Booster for [place] series) 08/03/2021     Advance Directives   What are advance directives? Advance directives are legal documents that state your wishes and plans for medical care  These plans are made ahead of time in case you lose your ability to make decisions for yourself  Advance directives can apply to any medical decision, such as the treatments you want, and if you want to donate organs  What are the types of advance directives? There are many types of advance directives, and each state has rules about how to use them  You may choose a combination of any of the following:  · Living will: This is a written record of the treatment you want  You can also choose which treatments you do not want, which to limit, and which to stop at a certain time  This includes surgery, medicine, IV fluid, and tube feedings  · Durable power of  for healthcare [facility]): This is a written record that states who you want to make healthcare choices for you when you are unable to make them for yourself  This person, called a proxy, is usually a family member or a friend  You may choose more than 1 proxy  · Do not resuscitate (DNR) order:  A DNR order is used in case your heart stops beating or you stop breathing  It is a request not to have certain forms of treatment, such as CPR  A DNR order may be included in other types of advance directives  · Medical directive: This covers the care that you want if you are in a coma, near death, or unable to make decisions for yourself  You can list the treatments you want for each condition  Treatment may include pain medicine, surgery, blood transfusions, dialysis, IV or tube feedings, and a ventilator (breathing machine)  · Values history: This document has questions about your views, beliefs, and how you feel and think about life  This information can help others choose the care that you would choose  Why are advance directives important? An advance directive helps you control your care  Although spoken wishes may be used, it is better to have your wishes written down  Spoken wishes can be misunderstood, or not followed  Treatments may be given even if you do not want them  An advance directive may make it easier for your family to make difficult choices about your care        © Copyright [employer] 2018 Information is for End User's use only and may not be sold, redistributed or otherwise used for commercial purposes  All illustrations and images included in CareNotes® are the copyrighted property of [employer] , [employer]  or [facility] Preventive Visit Patient Instructions  Thank you for completing your Welcome to Medicare Visit or Medicare Annual Wellness Visit today  Your next wellness visit will be due in one year (2/23/2023)  The screening/preventive services that you may require over the next 5-10 years are detailed below  Some tests may not apply to you based off risk factors and/or age  Screening tests ordered at today's visit but not completed yet may show as past due  Also, please note that scanned in results may not display below  Preventive Screenings:  Service Recommendations Previous Testing/Comments   Colorectal Cancer Screening  * Colonoscopy    * Fecal Occult Blood Test (FOBT)/Fecal Immunochemical Test (FIT)  * Fecal DNA/Cologuard Test  * Flexible Sigmoidoscopy Age: 54-65 years old   Colonoscopy: every 10 years (may be performed more frequently if at higher risk)  OR  FOBT/FIT: every 1 year  OR  Cologuard: every 3 years  OR  Sigmoidoscopy: every 5 years  Screening may be recommended earlier than age 48 if at higher risk for colorectal cancer  Also, an individualized decision between you and your healthcare provider will decide whether screening between the ages of 74-80 would be appropriate  Colonoscopy: 01/22/2018  FOBT/FIT: Not on file  Cologuard: Not on file  Sigmoidoscopy: Not on file    Screening Current     Breast Cancer Screening Age: 36 years old  Frequency: every 1-2 years  Not required if history of left and right mastectomy Mammogram: 02/01/2022    Screening Current   Cervical Cancer Screening Between the ages of 21-29, pap smear recommended once every 3 years  Between the ages of 33-67, can perform pap smear with HPV co-testing every 5 years     Recommendations may differ for women with a history of total hysterectomy, cervical cancer, or abnormal pap smears in past  Pap Smear: 01/18/2021    Screening Not Indicated   Hepatitis C Screening Once for adults born between 1945 and 1965  More frequently in patients at high risk for Hepatitis C Hep C Antibody: 01/07/2020    Screening Current   Diabetes Screening 1-2 times per year if you're at risk for diabetes or have pre-diabetes Fasting glucose: 84 mg/dL   A1C: No results in last 5 years    Screening Current   Cholesterol Screening Once every 5 years if you don't have a lipid disorder  May order more often based on risk factors  Lipid panel: 01/07/2020    Screening Current     Other Preventive Screenings Covered by Medicare:  6  Abdominal Aortic Aneurysm (AAA) Screening: covered once if your at risk  You're considered to be at risk if you have a family history of AAA  7  Lung Cancer Screening: covers low dose CT scan once per year if you meet all of the following conditions: (1) Age 50-69; (2) No signs or symptoms of lung cancer; (3) Current smoker or have quit smoking within the last 15 years; (4) You have a tobacco smoking history of at least 30 pack years (packs per day multiplied by number of years you smoked); (5) You get a written order from a healthcare provider  8  Glaucoma Screening: covered annually if you're considered high risk: (1) You have diabetes OR (2) Family history of glaucoma OR (3)  aged 48 and older OR (3)  American aged 72 and older  5  Osteoporosis Screening: covered every 2 years if you meet one of the following conditions: (1) You're estrogen deficient and at risk for osteoporosis based off medical history and other findings; (2) Have a vertebral abnormality; (3) On glucocorticoid therapy for more than 3 months; (4) Have primary hyperparathyroidism; (5) On osteoporosis medications and need to assess response to drug therapy     · Last bone density test (DXA Scan): 02/14/2022   10  HIV Screening: covered annually if you're between the age of 12-76  Also covered annually if you are younger than 13 and older than 72 with risk factors for HIV infection  For pregnant patients, it is covered up to 3 times per pregnancy  Immunizations:  Immunization Recommendations   Influenza Vaccine Annual influenza vaccination during flu season is recommended for all persons aged >= 6 months who do not have contraindications   Pneumococcal Vaccine (Prevnar and Pneumovax)  * Prevnar = PCV13  * Pneumovax = PPSV23   Adults 25-60 years old: 1-3 doses may be recommended based on certain risk factors  Adults 72 years old: Prevnar (PCV13) vaccine recommended followed by Pneumovax (PPSV23) vaccine  If already received PPSV23 since turning 65, then PCV13 recommended at least one year after PPSV23 dose  Hepatitis B Vaccine 3 dose series if at intermediate or high risk (ex: diabetes, end stage renal disease, liver disease)   Tetanus (Td) Vaccine - COST NOT COVERED BY MEDICARE PART B Following completion of primary series, a booster dose should be given every 10 years to maintain immunity against tetanus  Td may also be given as tetanus wound prophylaxis  Tdap Vaccine - COST NOT COVERED BY MEDICARE PART B Recommended at least once for all adults  For pregnant patients, recommended with each pregnancy  Shingles Vaccine (Shingrix) - COST NOT COVERED BY MEDICARE PART B  2 shot series recommended in those aged 48 and above     Health Maintenance Due:      Topic Date Due    Colorectal Cancer Screening  01/22/2023    Breast Cancer Screening: Mammogram  02/01/2023    DXA SCAN  02/14/2024    Hepatitis C Screening  Completed     Immunizations Due:      Topic Date Due    DTaP,Tdap,and Td Vaccines (1 - Tdap) Never done    COVID-19 Vaccine (3 - Booster for [place] series) 08/03/2021     Advance Directives   What are advance directives? Advance directives are legal documents that state your wishes and plans for medical care   These plans are made ahead of time in case you lose your ability to make decisions for yourself  Advance directives can apply to any medical decision, such as the treatments you want, and if you want to donate organs  What are the types of advance directives? There are many types of advance directives, and each state has rules about how to use them  You may choose a combination of any of the following:  · Living will: This is a written record of the treatment you want  You can also choose which treatments you do not want, which to limit, and which to stop at a certain time  This includes surgery, medicine, IV fluid, and tube feedings  · Durable power of  for healthcare [facility]): This is a written record that states who you want to make healthcare choices for you when you are unable to make them for yourself  This person, called a proxy, is usually a family member or a friend  You may choose more than 1 proxy  · Do not resuscitate (DNR) order:  A DNR order is used in case your heart stops beating or you stop breathing  It is a request not to have certain forms of treatment, such as CPR  A DNR order may be included in other types of advance directives  · Medical directive: This covers the care that you want if you are in a coma, near death, or unable to make decisions for yourself  You can list the treatments you want for each condition  Treatment may include pain medicine, surgery, blood transfusions, dialysis, IV or tube feedings, and a ventilator (breathing machine)  · Values history: This document has questions about your views, beliefs, and how you feel and think about life  This information can help others choose the care that you would choose  Why are advance directives important? An advance directive helps you control your care  Although spoken wishes may be used, it is better to have your wishes written down  Spoken wishes can be misunderstood, or not followed  Treatments may be given even if you do not want them   An advance directive may make it easier for your family to make difficult choices about your care  © Copyright [employer] 2018 Information is for End User's use only and may not be sold, redistributed or otherwise used for commercial purposes   All illustrations and images included in CareNotes® are the copyrighted property of A D A M [employer]  or [address] [place], [place] [address]tristin [place]if[place]

## 2022-03-26 ENCOUNTER — HOSPITAL ENCOUNTER (EMERGENCY)
Facility: HOSPITAL | Age: 77
Discharge: HOME | End: 2022-03-26
Attending: EMERGENCY MEDICINE | Admitting: EMERGENCY MEDICINE
Payer: MEDICARE

## 2022-03-26 ENCOUNTER — NURSE TRIAGE (OUTPATIENT)
Dept: OTHER | Facility: OTHER | Age: 77
End: 2022-03-26

## 2022-03-26 VITALS
DIASTOLIC BLOOD PRESSURE: 79 MMHG | SYSTOLIC BLOOD PRESSURE: 171 MMHG | TEMPERATURE: 97.3 F | OXYGEN SATURATION: 100 % | BODY MASS INDEX: 21.01 KG/M2 | RESPIRATION RATE: 14 BRPM | WEIGHT: 118.61 LBS | HEART RATE: 80 BPM

## 2022-03-26 DIAGNOSIS — I10 HYPERTENSION: Primary | ICD-10-CM

## 2022-03-26 PROCEDURE — 99283 EMERGENCY DEPT VISIT LOW MDM: CPT

## 2022-03-26 PROCEDURE — 99282 EMERGENCY DEPT VISIT SF MDM: CPT | Performed by: EMERGENCY MEDICINE

## 2022-03-26 NOTE — TELEPHONE ENCOUNTER
Reason for Disposition   [7] Systolic BP  >= 016 OR Diastolic >= 178 AND [1] cardiac or neurologic symptoms (e g , chest pain, difficulty breathing, unsteady gait, blurred vision)    Answer Assessment - Initial Assessment Questions  1  BLOOD PRESSURE: "What is the blood pressure?" "Did you take at least two measurements 5 minutes apart?"      063/553; earlier systolic over 620  2  ONSET: "When did you take your blood pressure?"      About an hour ago  3  HOW: "How did you obtain the blood pressure?" (e g , visiting nurse, automatic home BP monitor)      Automatic and manual cuff  4  HISTORY: "Do you have a history of high blood pressure?"      Denies  5  MEDICATIONS: "Are you taking any medications for blood pressure?" "Have you missed any doses recently?"      Denies  6   OTHER SYMPTOMS: "Do you have any symptoms?" (e g , headache, chest pain, blurred vision, difficulty breathing, weakness)      Headache    Protocols used: BLOOD PRESSURE - HIGH-ADULT-

## 2022-03-26 NOTE — TELEPHONE ENCOUNTER
Regarding: High Blood Pressure 177 over 118  ----- Message from [facility] sent at 3/26/2022  3:35 PM EDT -----  "I just got a blood pressure reading of high blood pressure 177/118 and my normal is 106/158, feeling light headed"

## 2022-03-26 NOTE — ED PROVIDER NOTES
History  Chief Complaint   Patient presents with    Hypertension     To ED with c/o several high blood pressures x 3 days intermittantly  Denies any HA, CP or SOB     Patient is a 44-year-old female who presents with high blood pressure readings at home  Patient reports that on Thursday, she presented to [facility] in order to have a procedure, she had her blood pressure taken and it was elevated  She initially attributed it to being nervous at the doctor's office, but when she went to her mother's house on Friday evening, she used the mom's blood pressure machine, and it was systolic in the 699K  Patient then took her mother machine home and rechecked it again today,  She started to become worried, rechecked it and it went higher  She called her primary care office and they recommended she come to the emergency department for evaluation  Patient denies any headache, chest pain, lightheadedness, dizziness, changes in vision, shortness of breath, palpitations, numbness, tingling, weakness  Prior to Admission Medications   Prescriptions Last Dose Informant Patient Reported? Taking? betamethasone, augmented, (DIPROLENE) 0 05 % ointment   Yes No   Sig: APPLY TO RASH ON UPPER/ LOWER EXTREMITIES TWICE DAILY X10-14 DAYS (DO NOT USE ON FACE OR BODY FOLDS)   estradiol (Yuvafem) 10 MCG TABS vaginal tablet   No No   Sig: Insert 1 tablet (10 mcg total) into the vagina 2 (two) times a week At bedtime   latanoprost (XALATAN) 0 005 % ophthalmic solution   Yes No   Sig: INSTILL 1 DROP INTO BOTH EYES AT BEDTIME   zolpidem (AMBIEN) 5 mg tablet   No No   Sig: Take   5-1 tab as needed for sleep      Facility-Administered Medications: None       Past Medical History:   Diagnosis Date    Breast asymmetry     Resolved: Nov 9, 2017    Causalgia of upper limb     last assessed: Jan 29, 2014    [facility]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[facility])     Transaminitis     Last assessed: Mike 3, 2014    Varicose veins with pain, unspecified laterality Resolved: June 6, 2017    Vasovagal syncope     last assessed: Dec 30, 2013       Past Surgical History:   Procedure Laterality Date    BREAST BIOPSY      unsure laterality or date of biopsy    CATARACT EXTRACTION, BILATERAL Bilateral 2018 january and april    COLONOSCOPY      Complete - [place] 2/13/13 - Hemorrhoids, repeat 5 years d/t family h/o colon cancer     FOOT SURGERY      MOUTH SURGERY      Tooth Extraction - last assessed: Aug 19, 2014    MOUTH SURGERY N/A 11/2017    SKIN BIOPSY Right 07/2018    right thigh    TONSILLECTOMY      last assessed: Aug 19, 2014       Family History   Problem Relation Age of Onset    Colon cancer Mother         diagnosed in her 45s and 66's    Endometrial cancer Mother         63's    Lung cancer Father 80    Colon cancer Brother 64    No Known Problems Sister     No Known Problems Maternal Grandmother     No Known Problems Maternal Grandfather     No Known Problems Paternal Grandmother     No Known Problems Paternal Grandfather     Colon cancer Maternal Aunt         unknown age of onset    No Known Problems Maternal Aunt     No Known Problems Paternal Aunt     No Known Problems Paternal Aunt     No Known Problems Paternal Aunt     No Known Problems Paternal Aunt     No Known Problems Paternal Aunt      I have reviewed and agree with the history as documented  E-Cigarette/Vaping    E-Cigarette Use Never User      E-Cigarette/Vaping Substances    Nicotine No     THC No     CBD No     Flavoring No     Other No     Unknown No      Social History     Tobacco Use    Smoking status: Never Smoker    Smokeless tobacco: Never Used   Vaping Use    Vaping Use: Never used   Substance Use Topics    Alcohol use: Yes     Alcohol/week: 5 0 - 6 0 standard drinks     Types: 5 - 6 Glasses of wine per week     Comment: 1 per day - social/ drinks wine     Drug use: No       Review of Systems   Constitutional: Negative for chills and fever     Eyes: Negative for photophobia and visual disturbance  Respiratory: Negative for chest tightness and shortness of breath  Cardiovascular: Negative for chest pain and palpitations  Gastrointestinal: Negative for nausea and vomiting  Skin: Negative for color change and pallor  Neurological: Negative for dizziness, syncope, weakness, light-headedness, numbness and headaches  Physical Exam  Physical Exam  Vitals and nursing note reviewed  Constitutional:       General: She is not in acute distress  Appearance: Normal appearance  She is not ill-appearing, toxic-appearing or diaphoretic  HENT:      Head: Normocephalic and atraumatic  Mouth/Throat:      Mouth: Mucous membranes are moist    Eyes:      Conjunctiva/sclera: Conjunctivae normal       Pupils: Pupils are equal, round, and reactive to light  Cardiovascular:      Rate and Rhythm: Normal rate and regular rhythm  Pulses: Normal pulses  Heart sounds: Normal heart sounds  No murmur heard  Pulmonary:      Effort: Pulmonary effort is normal  No respiratory distress  Breath sounds: Normal breath sounds  No stridor  No wheezing, rhonchi or rales  Chest:      Chest wall: No tenderness  Abdominal:      General: Bowel sounds are normal  There is no distension  Palpations: Abdomen is soft  Tenderness: There is no abdominal tenderness  There is no guarding or rebound  Musculoskeletal:      Cervical back: Neck supple  Right lower leg: No edema  Left lower leg: No edema  Skin:     General: Skin is warm and dry  Neurological:      General: No focal deficit present  Mental Status: She is alert and oriented to person, place, and time  Mental status is at baseline  GCS: GCS eye subscore is 4  GCS verbal subscore is 5  GCS motor subscore is 6  Cranial Nerves: Cranial nerves are intact  No cranial nerve deficit, dysarthria or facial asymmetry  Sensory: Sensation is intact        Motor: Motor function is intact  No weakness or abnormal muscle tone  Coordination: Coordination is intact  Finger-Nose-Finger Test normal       Gait: Gait is intact  Psychiatric:         Mood and Affect: Mood normal          Behavior: Behavior normal          Vital Signs  ED Triage Vitals [03/26/22 1647]   Temperature Pulse Respirations Blood Pressure SpO2   (!) 97 3 °F (36 3 °C) 83 20 (!) 199/93 100 %      Temp Source Heart Rate Source Patient Position - Orthostatic VS BP Location FiO2 (%)   Tympanic Monitor Lying Right arm --      Pain Score       No Pain           Vitals:    03/26/22 1647 03/26/22 1658   BP: (!) 199/93 (!) 171/79   Pulse: 83 80   Patient Position - Orthostatic VS: Lying          Visual Acuity      ED Medications  Medications - No data to display    Diagnostic Studies  Results Reviewed     None                 No orders to display              Procedures  Procedures         ED Course                                             MDM  Number of Diagnoses or Management Options  Hypertension  Diagnosis management comments: Assessment plan:  81-year-old female presenting with asymptomatic hypertension  Patient states that she will be able to schedule a follow-up appointment with her primary care doctor on Monday  As such, will refrain from starting any medications in the emergency department  I counseled the patient regarding strict return precautions which she verbalized understanding of  Disposition  Final diagnoses:   Hypertension     Time reflects when diagnosis was documented in both MDM as applicable and the Disposition within this note     Time User Action Codes Description Comment    3/26/2022  4:59 PM Leyla Banerjee Add [I10] Hypertension       ED Disposition     ED Disposition Condition Date/Time Comment    Discharge Stable Sat Mar 26, 2022  4:59 PM Burleigh Cabot discharge to home/self care              Follow-up Information     Follow up With Specialties Details Why Contact Info Additional Alaina Newby 1696, DO Internal Medicine, Family Medicine Schedule an appointment as soon as possible for a visit on 3/28/2022 for re-evaluation Dahlia  [address]  [address] [facility] [address] [facility],[address]        [facility] [address] Emergency Department Emergency Medicine Go to  As needed, If symptoms worsen, for re-evaluation [address] [place], [id number]  [address] [facility] Emergency Department, [address] [place] [address], [facility] [address]          Patient's Medications   Discharge Prescriptions    No medications on file       No discharge procedures on file      PDMP Review       Value Time User    PDMP Reviewed  Yes 2/22/2022  2:35 PM Michael Benito DO          ED Provider  Electronically Signed by           Rogelio Goldman DO  03/26/22 1182

## 2022-03-28 ENCOUNTER — OFFICE VISIT (OUTPATIENT)
Dept: FAMILY MEDICINE CLINIC | Facility: HOSPITAL | Age: 77
End: 2022-03-28
Payer: MEDICARE

## 2022-03-28 VITALS
DIASTOLIC BLOOD PRESSURE: 76 MMHG | WEIGHT: 120 LBS | HEART RATE: 112 BPM | HEIGHT: 63 IN | TEMPERATURE: 97.5 F | BODY MASS INDEX: 21.26 KG/M2 | SYSTOLIC BLOOD PRESSURE: 144 MMHG

## 2022-03-28 DIAGNOSIS — R03.0 ELEVATED BLOOD PRESSURE READING WITHOUT DIAGNOSIS OF HYPERTENSION: Primary | ICD-10-CM

## 2022-03-28 DIAGNOSIS — R00.0 TACHYCARDIA: ICD-10-CM

## 2022-03-28 PROCEDURE — 99214 OFFICE O/P EST MOD 30 MIN: CPT | Performed by: INTERNAL MEDICINE

## 2022-03-28 PROCEDURE — 93000 ELECTROCARDIOGRAM COMPLETE: CPT | Performed by: INTERNAL MEDICINE

## 2022-03-28 NOTE — PROGRESS NOTES
Assessment/Plan:    No problem-specific Assessment & Plan notes found for this encounter  Diagnoses and all orders for this visit:    Elevated blood pressure reading without diagnosis of hypertension  Comments:  Def of nml vs pre-HTN vs HTN reviewed, low sodium diet/exercise/avoiding NSAIDs reviewed, ECG in office w/o any LVH or ischemia, will check BW and follow closely, urged to check BP at home bid and bring readings AND cuff to next appt, proper BP taking technique reviewed, urged to call if readings consistently > 160/100 OR if symptoms occur, to ED with stroke/TIA symptoms, will follow - if con't to be elevated will check secondary HTN w/u  Orders:  -     POCT ECG  -     CBC and differential  -     Comprehensive metabolic panel  -     TSH, 3rd generation with Free T4 reflex    Tachycardia  Comments:  Admittedly a bit anxious for today's appt d/t recent above events, ECG in office today w/o arrhythmia, will check BW and have pt monitor BP at home closely, re-eval in 2 wks  Orders:  -     POCT ECG  -     CBC and differential  -     Comprehensive metabolic panel  -     TSH, 3rd generation with Free T4 reflex      Colonoscopy 1/18 - 5  Yrs     Mammo 2/22     Dexa 2/22 - osteopenia     BW 1/21 and 4/21  FLP 1/20    Subjective:      Patient ID: Neda Alonso is a 78 y o  female  HPI Pt here for ED follow up     Pt was seen at [facility] ED on 3/26/22 - ED note reviewed and events are summarized below  Pt presented to [facility] on 3/26/22 with c/o elevated BP readings at home  First told BP was elevated at oral surgeon  She checked it at home as she check's hre mom's BP - was 180's/90's  Checked it a few more times and was 200's/100's  She "thinks I had a slight headache"  She had called the office answering service and upon hearing home BP readings she was referred to the ED  In the ED her BP was 199/93 but there rest of her vitals and exam were wnl  She was asymptomatic with the elevated BP readings   She was discharged home and told to f/u with her PCP  Last few OV notes reviewed and BP had been up and down but was usually at goal bye end of appt when rechecked  She has remained off BP meds up till this time  She has been checking her BP at home since Saturday  Had a 180/102 and 144/84 and then up again today 180/100's again  She notes no changes in diet  She notes no new supplements/vitamins  She notes no new stressors and feels relaxed  She is sleeping well  She notes no HA's/dizziness/double vision/blurry vision/CP/palp  She does add "some" salt to her diet but it has not changed  She does not feel she has a high intake of high sodium foods  She is doing Ti Chi and yoga and walks  She denies daily use of NSIADs  She notes no increase in caffeine  Review of Systems   Constitutional: Negative for chills and fever  HENT: Negative for congestion  Eyes: Negative for pain and visual disturbance  Respiratory: Negative for cough and shortness of breath  Cardiovascular: Negative for chest pain, palpitations and leg swelling  Gastrointestinal: Negative for abdominal pain, diarrhea, nausea and rectal pain  Genitourinary: Negative for difficulty urinating and dysuria  Musculoskeletal: Negative for neck pain  Skin: Negative for rash and wound  Neurological: Negative for dizziness, seizures, speech difficulty, weakness, light-headedness, numbness and headaches  Psychiatric/Behavioral: Negative for sleep disturbance  The patient is not nervous/anxious  Objective:    /76   Pulse (!) 112   Temp 97 5 °F (36 4 °C) (Tympanic)   Ht 5' 3" (1 6 m)   Wt 54 4 kg (120 lb)   LMP  (LMP Unknown)   BMI 21 26 kg/m²      Physical Exam  Vitals and nursing note reviewed  Constitutional:       General: She is not in acute distress  Appearance: She is well-developed  She is not ill-appearing  HENT:      Head: Normocephalic and atraumatic     Eyes:      General:         Right eye: No discharge  Left eye: No discharge  Conjunctiva/sclera: Conjunctivae normal    Neck:      Trachea: No tracheal deviation  Cardiovascular:      Rate and Rhythm: Regular rhythm  Tachycardia present  Heart sounds: Normal heart sounds  No murmur heard  No friction rub  Pulmonary:      Effort: Pulmonary effort is normal  No respiratory distress  Breath sounds: Normal breath sounds  No wheezing, rhonchi or rales  Abdominal:      General: There is no distension  Palpations: Abdomen is soft  Tenderness: There is no abdominal tenderness  There is no guarding or rebound  Musculoskeletal:         General: No deformity or signs of injury  Cervical back: Neck supple  Right lower leg: No edema  Left lower leg: No edema  Skin:     General: Skin is warm  Coloration: Skin is not pale  Findings: No rash  Neurological:      General: No focal deficit present  Mental Status: She is alert  Mental status is at baseline  Motor: No abnormal muscle tone  Gait: Gait normal    Psychiatric:         Mood and Affect: Mood normal          Behavior: Behavior normal          Thought Content:  Thought content normal          Judgment: Judgment normal

## 2022-03-30 ENCOUNTER — APPOINTMENT (OUTPATIENT)
Dept: LAB | Facility: CLINIC | Age: 77
End: 2022-03-30
Payer: MEDICARE

## 2022-03-30 LAB
ALBUMIN SERPL BCP-MCNC: 3.8 G/DL (ref 3.5–5)
ALP SERPL-CCNC: 73 U/L (ref 46–116)
ALT SERPL W P-5'-P-CCNC: 36 U/L (ref 12–78)
ANION GAP SERPL CALCULATED.3IONS-SCNC: 3 MMOL/L (ref 4–13)
AST SERPL W P-5'-P-CCNC: 26 U/L (ref 5–45)
BASOPHILS # BLD AUTO: 0.05 THOUSANDS/ΜL (ref 0–0.1)
BASOPHILS NFR BLD AUTO: 1 % (ref 0–1)
BILIRUB SERPL-MCNC: 0.63 MG/DL (ref 0.2–1)
BUN SERPL-MCNC: 25 MG/DL (ref 5–25)
CALCIUM SERPL-MCNC: 9.2 MG/DL (ref 8.3–10.1)
CHLORIDE SERPL-SCNC: 104 MMOL/L (ref 100–108)
CO2 SERPL-SCNC: 30 MMOL/L (ref 21–32)
CREAT SERPL-MCNC: 0.87 MG/DL (ref 0.6–1.3)
EOSINOPHIL # BLD AUTO: 0.18 THOUSAND/ΜL (ref 0–0.61)
EOSINOPHIL NFR BLD AUTO: 5 % (ref 0–6)
ERYTHROCYTE [DISTWIDTH] IN BLOOD BY AUTOMATED COUNT: 12.2 % (ref 11.6–15.1)
GFR SERPL CREATININE-BSD FRML MDRD: 65 ML/MIN/1.73SQ M
GLUCOSE P FAST SERPL-MCNC: 85 MG/DL (ref 65–99)
HCT VFR BLD AUTO: 44 % (ref 34.8–46.1)
HGB BLD-MCNC: 14.2 G/DL (ref 11.5–15.4)
IMM GRANULOCYTES # BLD AUTO: 0.01 THOUSAND/UL (ref 0–0.2)
IMM GRANULOCYTES NFR BLD AUTO: 0 % (ref 0–2)
LYMPHOCYTES # BLD AUTO: 0.67 THOUSANDS/ΜL (ref 0.6–4.47)
LYMPHOCYTES NFR BLD AUTO: 19 % (ref 14–44)
MCH RBC QN AUTO: 32.7 PG (ref 26.8–34.3)
MCHC RBC AUTO-ENTMCNC: 32.3 G/DL (ref 31.4–37.4)
MCV RBC AUTO: 101 FL (ref 82–98)
MONOCYTES # BLD AUTO: 0.33 THOUSAND/ΜL (ref 0.17–1.22)
MONOCYTES NFR BLD AUTO: 9 % (ref 4–12)
NEUTROPHILS # BLD AUTO: 2.28 THOUSANDS/ΜL (ref 1.85–7.62)
NEUTS SEG NFR BLD AUTO: 66 % (ref 43–75)
NRBC BLD AUTO-RTO: 0 /100 WBCS
PLATELET # BLD AUTO: 242 THOUSANDS/UL (ref 149–390)
PMV BLD AUTO: 10.6 FL (ref 8.9–12.7)
POTASSIUM SERPL-SCNC: 4.7 MMOL/L (ref 3.5–5.3)
PROT SERPL-MCNC: 6.6 G/DL (ref 6.4–8.2)
RBC # BLD AUTO: 4.34 MILLION/UL (ref 3.81–5.12)
SODIUM SERPL-SCNC: 137 MMOL/L (ref 136–145)
TSH SERPL DL<=0.05 MIU/L-ACNC: 2.07 UIU/ML (ref 0.36–3.74)
WBC # BLD AUTO: 3.52 THOUSAND/UL (ref 4.31–10.16)

## 2022-03-30 PROCEDURE — 36415 COLL VENOUS BLD VENIPUNCTURE: CPT | Performed by: INTERNAL MEDICINE

## 2022-03-30 PROCEDURE — 84443 ASSAY THYROID STIM HORMONE: CPT | Performed by: INTERNAL MEDICINE

## 2022-03-30 PROCEDURE — 80053 COMPREHEN METABOLIC PANEL: CPT | Performed by: INTERNAL MEDICINE

## 2022-03-30 PROCEDURE — 85025 COMPLETE CBC W/AUTO DIFF WBC: CPT | Performed by: INTERNAL MEDICINE

## 2022-04-11 ENCOUNTER — OFFICE VISIT (OUTPATIENT)
Dept: FAMILY MEDICINE CLINIC | Facility: HOSPITAL | Age: 77
End: 2022-04-11
Payer: MEDICARE

## 2022-04-11 VITALS
DIASTOLIC BLOOD PRESSURE: 80 MMHG | TEMPERATURE: 97.2 F | HEART RATE: 98 BPM | WEIGHT: 120.8 LBS | BODY MASS INDEX: 21.4 KG/M2 | HEIGHT: 63 IN | SYSTOLIC BLOOD PRESSURE: 156 MMHG

## 2022-04-11 DIAGNOSIS — I10 PRIMARY HYPERTENSION: Primary | ICD-10-CM

## 2022-04-11 DIAGNOSIS — D75.89 MACROCYTOSIS: ICD-10-CM

## 2022-04-11 PROCEDURE — 3008F BODY MASS INDEX DOCD: CPT | Performed by: INTERNAL MEDICINE

## 2022-04-11 PROCEDURE — 1160F RVW MEDS BY RX/DR IN RCRD: CPT | Performed by: INTERNAL MEDICINE

## 2022-04-11 PROCEDURE — 99214 OFFICE O/P EST MOD 30 MIN: CPT | Performed by: INTERNAL MEDICINE

## 2022-04-11 RX ORDER — AMLODIPINE BESYLATE 2.5 MG/1
2.5 TABLET ORAL DAILY
Qty: 30 TABLET | Refills: 1 | Status: SHIPPED | OUTPATIENT
Start: 2022-04-11 | End: 2022-04-26 | Stop reason: SDUPTHER

## 2022-04-11 NOTE — ASSESSMENT & PLAN NOTE
New dx of HTN - home cuff checked and correlates and is above goal, start Amlodipine 2 5 mg 1 tab PO q day, urged to con't checking BP at home and call if readings > 140/90 after 2 wks - will increase to 5 mg if still elevated, will check MIREYA d/t sudden abrupt onset - no s/sx of LUCIEN and labs not c/w hyperaldosteronism, will follow closely

## 2022-04-14 ENCOUNTER — HOSPITAL ENCOUNTER (OUTPATIENT)
Dept: NON INVASIVE DIAGNOSTICS | Facility: HOSPITAL | Age: 77
Discharge: HOME | End: 2022-04-14
Payer: MEDICARE

## 2022-04-14 DIAGNOSIS — I10 PRIMARY HYPERTENSION: ICD-10-CM

## 2022-04-14 PROCEDURE — 93975 VASCULAR STUDY: CPT | Performed by: SURGERY

## 2022-04-14 PROCEDURE — 93975 VASCULAR STUDY: CPT

## 2022-04-26 ENCOUNTER — TELEPHONE (OUTPATIENT)
Dept: FAMILY MEDICINE CLINIC | Facility: HOSPITAL | Age: 77
End: 2022-04-26

## 2022-04-26 NOTE — TELEPHONE ENCOUNTER
Home BP readings reviewed and are still above goal but some numbers are starting to come down- increase Amlodipine from 2 5 mg daily to 5 mg daily,  can take 2 5 mg 2 tab PO once daily and when runs out a rx for 5 mg 1 tab daily was sent to local pharmacy  Keep BP check appt

## 2022-04-26 NOTE — TELEPHONE ENCOUNTER
pls see bp log on your desk    Patient asking you to review and let her know if you have any med changes for her

## 2022-05-03 ENCOUNTER — OFFICE VISIT (OUTPATIENT)
Dept: FAMILY MEDICINE CLINIC | Facility: HOSPITAL | Age: 77
End: 2022-05-03
Payer: MEDICARE

## 2022-05-03 VITALS
HEIGHT: 63 IN | DIASTOLIC BLOOD PRESSURE: 62 MMHG | SYSTOLIC BLOOD PRESSURE: 126 MMHG | HEART RATE: 96 BPM | BODY MASS INDEX: 21.4 KG/M2 | TEMPERATURE: 98 F | WEIGHT: 120.8 LBS

## 2022-05-03 DIAGNOSIS — I10 PRIMARY HYPERTENSION: Primary | ICD-10-CM

## 2022-05-03 PROCEDURE — 99213 OFFICE O/P EST LOW 20 MIN: CPT | Performed by: INTERNAL MEDICINE

## 2022-05-03 PROCEDURE — 3008F BODY MASS INDEX DOCD: CPT | Performed by: INTERNAL MEDICINE

## 2022-05-03 NOTE — ASSESSMENT & PLAN NOTE
Bp improved with increase in Amlodipine, con't current regimen for now, con't checking BP at home and call if home readings consistently > 140/90, recheck in 3 mos and if still good go out to q 6 mos

## 2022-05-03 NOTE — PROGRESS NOTES
Assessment/Plan:    Primary hypertension  Bp improved with increase in Amlodipine, con't current regimen for now, con't checking BP at home and call if home readings consistently > 140/90, recheck in 3 mos and if still good go out to q 6 mos       Diagnoses and all orders for this visit:    Primary hypertension      Colonoscopy 1/18 - 5  Yrs     Mammo 2/22     Dexa 2/22 - osteopenia     BW 3/22  FLP 1/20    Questions about 2nd COVID booster answered and recommendations reviewed        Subjective:      Patient ID: Jared Walker is a 78 y o  female  HPI Pt here for follow up appt    Last visit in Feb pt was started on Amlodipine 2 5 mg q day for new HTN  She called 2 wks later with con't elevated BP readings and we increased further to 2 5 mg 2 tab a day  She is here today for a med/BP check  BP at goal today and meds were reviewed and med list is UTD  She denies missing doses of meds or SE with the meds  She does check her BP outside the office and home numbers were brought in and reviewed - average mid to upper 130's/low 80's  She notes no frequent HA's/dizziness/double vision/CP  Review of Systems   Constitutional: Negative for fatigue and unexpected weight change  Eyes: Negative for pain and visual disturbance  Cardiovascular: Negative for chest pain, palpitations and leg swelling  Gastrointestinal: Negative for diarrhea and nausea  Skin: Negative for rash and wound  Neurological: Negative for dizziness, light-headedness and headaches  Objective:    /62   Pulse 96   Temp 98 °F (36 7 °C) (Tympanic)   Ht 5' 3" (1 6 m)   Wt 54 8 kg (120 lb 12 8 oz)   LMP  (LMP Unknown)   BMI 21 40 kg/m²      Physical Exam  Vitals and nursing note reviewed  Constitutional:       General: She is not in acute distress  Appearance: She is not ill-appearing  HENT:      Head: Normocephalic and atraumatic  Eyes:      General:         Right eye: No discharge           Left eye: No discharge  Conjunctiva/sclera: Conjunctivae normal    Pulmonary:      Effort: Pulmonary effort is normal  No respiratory distress  Skin:     Coloration: Skin is not pale  Findings: No rash  Psychiatric:         Mood and Affect: Mood normal          Behavior: Behavior normal          Thought Content:  Thought content normal          Judgment: Judgment normal

## 2022-05-14 NOTE — PROGRESS NOTES
Daily Note     Today's date: 2021  Patient name: Bharathi Cancer  : 1945  MRN: [id number]  Referring provider: Tej Lindsey DO  Dx:   Encounter Diagnosis     ICD-10-CM    1  Acute pain of left shoulder  M25 512    2  Sciatica of right side  M54 31        Start Time: 0800          Subjective: Pt reports overall feeling the same, she feels like overall she has made progress but that  she feels like she has hit a plateau  She admits she has been more active at home and doing more yard work  Objective: See treatment diary below      Assessment: Tolerated treatment fair, PROM cause soreness and some pain in the anterior aspect of the shoulder  Talked to patient about holding shoulder retraction with Y and MT exercise to build endurance  Patient exhibited good technique with therapeutic exercises and would benefit from continued PT  Pt with continued forward left shoulder in sitting and winging of scapula in sitting compared to R side  Plan: Continue per plan of care      Patient treated by ROBIN Calixto under my direct supervision          Precautions: hx of guillan-barre      Manuals 4/7 4/14       3/17 3/31   Left subscap, and infra stm PM/DL PM/DL       DB DL   Left pec pin and stretch PM/DL PM/DL       DB DL   T/s mobs  Trial nv           Shoulder mobs  Trial nv           Right  Gluteal stm PM/DL PM/DL        DL   Neuro Re-Ed             S/l Er             Supine open book             Bent over  MT 2x10 2x10       2x10    Prone Y- bent over 2x10 2x10       Prone Y 2x10    Bent over ext             Serratus punch into flexion             Serratus ABC             Ther Ex             pec doorway low hand             Bicep stretch (elbow bent)             Sciatic sliders supine x10 x10        sciatic sliders x10   Piriformis  15"x5 15"x5 ea        pirifrmis 15"x5   shld Er             shld IR             Supine H abd             Supine pnf             Ther Activity             Sit to stand Green 2x10 Green 2x10        gtb 2x10                Gait Training                                       Modalities 14-May-2022 16:33

## 2022-05-19 DIAGNOSIS — I10 PRIMARY HYPERTENSION: ICD-10-CM

## 2022-05-19 RX ORDER — AMLODIPINE BESYLATE 5 MG/1
5 TABLET ORAL DAILY
Qty: 30 TABLET | Refills: 1 | Status: SHIPPED | OUTPATIENT
Start: 2022-05-19 | End: 2022-06-15

## 2022-06-15 DIAGNOSIS — I10 PRIMARY HYPERTENSION: ICD-10-CM

## 2022-06-15 RX ORDER — AMLODIPINE BESYLATE 5 MG/1
5 TABLET ORAL DAILY
Qty: 90 TABLET | Refills: 1 | Status: SHIPPED | OUTPATIENT
Start: 2022-06-15

## 2022-07-05 ENCOUNTER — TELEPHONE (OUTPATIENT)
Dept: FAMILY MEDICINE CLINIC | Facility: HOSPITAL | Age: 77
End: 2022-07-05

## 2022-07-05 NOTE — TELEPHONE ENCOUNTER
Patient is taking amlodipine for her BP  She has a 3 mo f/u in August but is currently experiencing posterior tibital edema in both feet in the morning and evening  Is this normal? Do you still want to wait until August to address this?

## 2022-07-05 NOTE — TELEPHONE ENCOUNTER
If swelling only mild and not bothersome then waiting till Aug is fine, if significant and bothersome then she should be seen

## 2022-08-04 ENCOUNTER — OFFICE VISIT (OUTPATIENT)
Dept: FAMILY MEDICINE CLINIC | Facility: HOSPITAL | Age: 77
End: 2022-08-04
Payer: MEDICARE

## 2022-08-04 VITALS
SYSTOLIC BLOOD PRESSURE: 128 MMHG | BODY MASS INDEX: 21.02 KG/M2 | TEMPERATURE: 97.1 F | HEART RATE: 84 BPM | WEIGHT: 118.6 LBS | HEIGHT: 63 IN | DIASTOLIC BLOOD PRESSURE: 72 MMHG

## 2022-08-04 DIAGNOSIS — R60.0 BILATERAL LEG EDEMA: ICD-10-CM

## 2022-08-04 DIAGNOSIS — I10 PRIMARY HYPERTENSION: Primary | ICD-10-CM

## 2022-08-04 PROCEDURE — 99214 OFFICE O/P EST MOD 30 MIN: CPT | Performed by: INTERNAL MEDICINE

## 2022-08-04 RX ORDER — OLMESARTAN MEDOXOMIL 5 MG/1
5 TABLET ORAL DAILY
Qty: 30 TABLET | Refills: 1 | Status: SHIPPED | OUTPATIENT
Start: 2022-08-04 | End: 2022-08-26

## 2022-08-04 NOTE — ASSESSMENT & PLAN NOTE
BP great but having bothersome LE edema presumbly made worse with CCB - urged to elevate LE at rest and to stop adding salt to diet, stop Amlodipine and will start Olmesartan 5 mg 1 tab PO q day, SE reviewed, BMP in 1 wk (order given), recheck BP in 4 wks - call if after 2 wks on new rx has consistently elevated BP readings > 140/90 and will increase ARB to 10 mg if needed

## 2022-08-04 NOTE — PROGRESS NOTES
Assessment/Plan:    Primary hypertension  BP great but having bothersome LE edema presumbly made worse with CCB - urged to elevate LE at rest and to stop adding salt to diet, stop Amlodipine and will start Olmesartan 5 mg 1 tab PO q day, SE reviewed, BMP in 1 wk (order given), recheck BP in 4 wks - call if after 2 wks on new rx has consistently elevated BP readings > 140/90 and will increase ARB to 10 mg if needed       Diagnoses and all orders for this visit:    Primary hypertension  -     olmesartan (BENICAR) 5 mg tablet; Take 1 tablet (5 mg total) by mouth daily  -     Basic metabolic panel; Future    Bilateral leg edema  Comments:  Likely multifactorial d/t salt in diet/heat and dependent edema, has no s/sx of HF - CCB could certainly be contributing to it as well - will stop CCB and try different agent, urged to stop adding salt to diet and elevate LE at rest, re-eval in 4 wks      Colonoscopy 1/18 - 5 yrs    Mammo 2/22    Dexa 2/22 - osteopenia    BW 3/22  FLP 1/20      Subjective:      Patient ID: Aletha Morgan is a 78 y o  female  HPI Pt here for follow up appt    BP at goal today and meds were reviewed and no changes have occurred  She denies missing doses of meds but she has had some SE  She is having a lot of LLE edema at the end of the day and better in the am  The swelling is uncomfortable at times  She does check her BP outside the office and readings were brought in and reviewed  Average home BP reading is mid 120's/70's  She notes no frequent HA's/dizziness/double vision  She denies CP/palp/orthopnea/PND/wgt gain  Review of Systems   Constitutional: Negative for chills, fever and unexpected weight change  Eyes: Negative for pain and visual disturbance  Respiratory: Negative for cough and shortness of breath  Cardiovascular: Positive for leg swelling  Negative for chest pain and palpitations  Gastrointestinal: Negative for abdominal pain, diarrhea and nausea     Skin: Negative for rash and wound  Neurological: Negative for dizziness and headaches  Psychiatric/Behavioral: Negative for dysphoric mood  Objective:    /72   Pulse 84   Temp (!) 97 1 °F (36 2 °C) (Tympanic)   Ht 5' 3" (1 6 m)   Wt 53 8 kg (118 lb 9 6 oz)   LMP  (LMP Unknown)   BMI 21 01 kg/m²      Physical Exam  Vitals and nursing note reviewed  Constitutional:       General: She is not in acute distress  Appearance: She is well-developed  She is not ill-appearing  HENT:      Head: Normocephalic and atraumatic  Eyes:      General:         Right eye: No discharge  Left eye: No discharge  Conjunctiva/sclera: Conjunctivae normal    Neck:      Trachea: No tracheal deviation  Cardiovascular:      Rate and Rhythm: Normal rate and regular rhythm  Heart sounds: Normal heart sounds  No murmur heard  No friction rub  Pulmonary:      Effort: Pulmonary effort is normal  No respiratory distress  Breath sounds: Normal breath sounds  No wheezing, rhonchi or rales  Musculoskeletal:      Cervical back: Neck supple  Right lower leg: No edema  Left lower leg: No edema  Skin:     General: Skin is warm  Coloration: Skin is not pale  Findings: No rash  Neurological:      General: No focal deficit present  Mental Status: She is alert  Mental status is at baseline  Motor: No abnormal muscle tone  Gait: Gait normal    Psychiatric:         Behavior: Behavior normal          Thought Content:  Thought content normal          Judgment: Judgment normal

## 2022-08-05 ENCOUNTER — OFFICE VISIT (OUTPATIENT)
Dept: PODIATRY | Facility: CLINIC | Age: 77
End: 2022-08-05
Payer: MEDICARE

## 2022-08-05 VITALS
SYSTOLIC BLOOD PRESSURE: 156 MMHG | HEART RATE: 84 BPM | DIASTOLIC BLOOD PRESSURE: 80 MMHG | HEIGHT: 63 IN | BODY MASS INDEX: 20.91 KG/M2 | WEIGHT: 118 LBS

## 2022-08-05 DIAGNOSIS — M20.41 HAMMERTOE OF RIGHT FOOT: ICD-10-CM

## 2022-08-05 DIAGNOSIS — S93.105S TOE DISLOCATION, LEFT, SEQUELA: Primary | ICD-10-CM

## 2022-08-05 PROCEDURE — 99203 OFFICE O/P NEW LOW 30 MIN: CPT | Performed by: PODIATRIST

## 2022-08-05 NOTE — PROGRESS NOTES
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 toe is chronically dislocated and disfigured  Her 3rd toe is contracted  We discussed conservative versus surgical options  Her options are as follows     Option 1: Budin toe splint  And silicone toe pads provided office today  If this reduces her pain and allows her to wear the shoes she desires this could easily be her definitive treatment    Option 2 amputation of the 2nd digit with arthrodesis of the 3rd digit the recovery for this procedure was discussed in detail option 3 plantar plate repair of the 2nd digit with arthrodesis of 2nd and 3rd digits  Patient would like to try conservative care 1st   Reappoint as needed  She was very pleased with our discussion  Subjective:      Patient ID: Van Mead is a 78 y o  female  Patient presents with foot pain  She had 1st interspace mortons neuroma removed over 30 years ago  Over the years the 2nd toe has gotten very crooked  The 3rd toe is now going the other way  The 2nd toe rubs on the first and causes pain  She has tried toe spacers and taping the toe but they aren't helping  The following portions of the patient's history were reviewed and updated as appropriate:   She  has a past medical history of Breast asymmetry, Causalgia of upper limb, 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[address] ), Transaminitis, Varicose veins with pain, unspecified laterality, and Vasovagal syncope    She   Patient Active Problem List    Diagnosis Date Noted    Primary hypertension 04/11/2022    Squamous cell skin cancer 11/12/2018    Cataracta 11/09/2017    Osteopenia 11/09/2017    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    Macrocytosis 01/03/2014    Insomnia 04/08/2013 She  has a past surgical history that includes Skin biopsy (Right, 07/2018); Colonoscopy; Foot surgery; Mouth surgery; Tonsillectomy; Cataract extraction, bilateral (Bilateral, 2018); Mouth surgery (N/A, 11/2017); and Breast biopsy  Her family history includes Colon cancer in her maternal aunt and mother; Colon cancer (age of onset: 64) in her brother; Endometrial cancer in her mother; Lung cancer (age of onset: 80) in her father; No Known Problems in her maternal aunt, maternal grandfather, maternal grandmother, paternal aunt, paternal aunt, paternal aunt, paternal aunt, paternal aunt, paternal grandfather, paternal grandmother, and sister  She  reports that she has never smoked  She has never used smokeless tobacco  She reports current alcohol use of about 5 0 - 6 0 standard drinks of alcohol per week  She reports that she does not use drugs  Current Outpatient Medications   Medication Sig Dispense Refill    betamethasone, augmented, (DIPROLENE) 0 05 % ointment APPLY TO RASH ON UPPER/ LOWER EXTREMITIES TWICE DAILY X10-14 DAYS (DO NOT USE ON FACE OR BODY FOLDS)  1    estradiol (Yuvafem) 10 MCG TABS vaginal tablet Insert 1 tablet (10 mcg total) into the vagina 2 (two) times a week At bedtime 24 tablet 3    latanoprost (XALATAN) 0 005 % ophthalmic solution INSTILL 1 DROP INTO BOTH EYES AT BEDTIME      olmesartan (BENICAR) 5 mg tablet Take 1 tablet (5 mg total) by mouth daily 30 tablet 1    zolpidem (AMBIEN) 5 mg tablet Take   5-1 tab as needed for sleep 90 tablet 0    amLODIPine (NORVASC) 5 mg tablet TAKE 1 TABLET (5 MG TOTAL) BY MOUTH DAILY  (Patient not taking: Reported on 8/5/2022) 90 tablet 1     No current facility-administered medications for this visit       Current Outpatient Medications on File Prior to Visit   Medication Sig    betamethasone, augmented, (DIPROLENE) 0 05 % ointment APPLY TO RASH ON UPPER/ LOWER EXTREMITIES TWICE DAILY X10-14 DAYS (DO NOT USE ON FACE OR BODY FOLDS)    estradiol (Yuvafem) 10 MCG TABS vaginal tablet Insert 1 tablet (10 mcg total) into the vagina 2 (two) times a week At bedtime    latanoprost (XALATAN) 0 005 % ophthalmic solution INSTILL 1 DROP INTO BOTH EYES AT BEDTIME    olmesartan (BENICAR) 5 mg tablet Take 1 tablet (5 mg total) by mouth daily    zolpidem (AMBIEN) 5 mg tablet Take   5-1 tab as needed for sleep    amLODIPine (NORVASC) 5 mg tablet TAKE 1 TABLET (5 MG TOTAL) BY MOUTH DAILY  (Patient not taking: Reported on 8/5/2022)     No current facility-administered medications on file prior to visit  She has No Known Allergies       Review of Systems    Constitutional: Negative  HENT: Negative for sinus pressure and sinus pain  Respiratory: Negative for cough and shortness of breath  Cardiovascular: Negative for chest pain and leg swelling  Gastrointestinal: Negative for diarrhea, nausea and vomiting  Musculoskeletal: crooked toes   Skin: Negative for color change  Negative for rash and wound  Neurological: Negative for weakness, numbness and headaches  Psychiatric/Behavioral: The patient is not nervous/anxious  Objective:      /80   Pulse 84   Ht 5' 3" (1 6 m)   Wt 53 5 kg (118 lb)   LMP  (LMP Unknown)   BMI 20 90 kg/m²          Physical Exam    Vitals reviewed  Constitutional:       Appearance: Patient is not ill-appearing or diaphoretic  Patient is healthy weight  HENT:      Nose: No congestion or rhinorrhea  Cardiovascular:      Rate and Rhythm: Normal rate  Pulses: Normal pulses  Dorsalis pedis pulses are 2+ on the right side and 2+ on the left side  Posterior tibial pulses are 2+ on the right side and 2+ on the left side  Pulmonary:      Effort: Pulmonary effort is normal  No respiratory distress  Musculoskeletal:      Right lower leg: No edema  Left lower leg: No edema  Right foot:      Second digits is contracted and unstable at the MTPJ   Positive Lachman test    Third digits MTPJ stable but rigidcontracture at PIPJ  Left foot:      Normal range of motion  No deformity, bunion or prominent metatarsal heads  Skin:     Capillary Refill: Capillary refill takes less than 2 seconds  Findings: No bruising, erythema, lesion or rash  Toenail Condition: Toenails are normal    Neurological:      Mental Status: Alert and oriented to person, place, and time  Motor: No weakness  Gait: Gait normal        Right Protective Sensation: 10 sites tested  10 sites sensed  Left  Protective Sensation: 10 sites tested  10 sites sensed     Psychiatric:         Mood and Affect: Mood normal

## 2022-08-11 ENCOUNTER — APPOINTMENT (OUTPATIENT)
Dept: LAB | Facility: CLINIC | Age: 77
End: 2022-08-11
Payer: MEDICARE

## 2022-08-11 DIAGNOSIS — I10 PRIMARY HYPERTENSION: ICD-10-CM

## 2022-08-11 LAB
ANION GAP SERPL CALCULATED.3IONS-SCNC: 2 MMOL/L (ref 4–13)
BUN SERPL-MCNC: 19 MG/DL (ref 5–25)
CALCIUM SERPL-MCNC: 9.5 MG/DL (ref 8.3–10.1)
CHLORIDE SERPL-SCNC: 104 MMOL/L (ref 96–108)
CO2 SERPL-SCNC: 30 MMOL/L (ref 21–32)
CREAT SERPL-MCNC: 0.78 MG/DL (ref 0.6–1.3)
GFR SERPL CREATININE-BSD FRML MDRD: 74 ML/MIN/1.73SQ M
GLUCOSE P FAST SERPL-MCNC: 85 MG/DL (ref 65–99)
POTASSIUM SERPL-SCNC: 4.6 MMOL/L (ref 3.5–5.3)
SODIUM SERPL-SCNC: 136 MMOL/L (ref 135–147)

## 2022-08-11 PROCEDURE — 80048 BASIC METABOLIC PNL TOTAL CA: CPT

## 2022-08-11 PROCEDURE — 36415 COLL VENOUS BLD VENIPUNCTURE: CPT

## 2022-08-26 DIAGNOSIS — I10 PRIMARY HYPERTENSION: ICD-10-CM

## 2022-08-26 RX ORDER — OLMESARTAN MEDOXOMIL 5 MG/1
5 TABLET ORAL DAILY
Qty: 90 TABLET | Refills: 1 | Status: SHIPPED | OUTPATIENT
Start: 2022-08-26

## 2022-09-01 ENCOUNTER — OFFICE VISIT (OUTPATIENT)
Dept: FAMILY MEDICINE CLINIC | Facility: HOSPITAL | Age: 77
End: 2022-09-01
Payer: MEDICARE

## 2022-09-01 VITALS
TEMPERATURE: 97.4 F | HEART RATE: 76 BPM | BODY MASS INDEX: 21.26 KG/M2 | SYSTOLIC BLOOD PRESSURE: 128 MMHG | WEIGHT: 120 LBS | HEIGHT: 63 IN | DIASTOLIC BLOOD PRESSURE: 76 MMHG

## 2022-09-01 DIAGNOSIS — I10 PRIMARY HYPERTENSION: Primary | ICD-10-CM

## 2022-09-01 DIAGNOSIS — S93.105D: ICD-10-CM

## 2022-09-01 PROCEDURE — 99214 OFFICE O/P EST MOD 30 MIN: CPT | Performed by: INTERNAL MEDICINE

## 2022-09-01 NOTE — PROGRESS NOTES
Assessment/Plan:    Primary hypertension  Bp great today with med switch to Olmesartan, LE edema resolved with D/C of CCB, BMP wnl, con't current ARB, con't checking BP at home and call with consistent readings > 140/90,  recheck in office in 4-6 mos       Diagnoses and all orders for this visit:    Primary hypertension    Dislocation of toe of left foot, subsequent encounter  Comments:  Saw podiatry, tried conservative tx with cusion w/o benefit, likely going to surgical removal in the fall, con't tx and f/u as per podiatry      Colonoscopy 1/18 - 5 yrs    Mammo 2/22     Dexa 2/22 - osteopenia     BW 3/22  FLP 1/20      Subjective:      Patient ID: Charu Teresa is a 78 y o  female  HPI Pt here for BP check    Last visit pt was noting edema with Amlodipine  We subsequently stopped the CCB and placed her on Olmesartan 5 mg 1 tab PO q day  She had a BMP done and results were nml  She is here today for a BP/med check  BP at goal today and meds were reviewed and med list is UTD  She denies missing doses of meds or SE with the meds  She does not check her BP outside the office  She notes no frequent Ha's/dizziness/double vision/CP  Pt [facility] podiat[facility] (Dr [facility] Poll[facility] and chronic dislocation of L toe - OV note reviewed  She wanted to do conservative tx first  She tried toe splint and had no benefit at all  She is going to consider surgical tx  Review of Systems   Constitutional: Negative for chills and fever  HENT: Negative for congestion and trouble swallowing  Eyes: Negative for pain and visual disturbance  Respiratory: Negative for cough and shortness of breath  Cardiovascular: Negative for chest pain, palpitations and leg swelling  Gastrointestinal: Negative for abdominal pain, diarrhea and nausea  Musculoskeletal: Positive for arthralgias  Negative for back pain and neck pain  Skin: Negative for rash and wound     Neurological: Negative for dizziness, light-headedness and headaches  Hematological: Does not bruise/bleed easily  Psychiatric/Behavioral: Negative for dysphoric mood  Objective:    /76   Pulse 76   Temp (!) 97 4 °F (36 3 °C) (Tympanic)   Ht 5' 3" (1 6 m)   Wt 54 4 kg (120 lb)   LMP  (LMP Unknown)   BMI 21 26 kg/m²      Physical Exam  Vitals and nursing note reviewed  Constitutional:       General: She is not in acute distress  Appearance: She is well-developed  She is not ill-appearing  HENT:      Head: Normocephalic and atraumatic  Eyes:      General:         Right eye: No discharge  Left eye: No discharge  Conjunctiva/sclera: Conjunctivae normal    Neck:      Trachea: No tracheal deviation  Cardiovascular:      Rate and Rhythm: Normal rate and regular rhythm  Heart sounds: Normal heart sounds  No murmur heard  No friction rub  Pulmonary:      Effort: Pulmonary effort is normal  No respiratory distress  Breath sounds: Normal breath sounds  No wheezing, rhonchi or rales  Musculoskeletal:      Cervical back: Neck supple  Right lower leg: No edema  Left lower leg: No edema  Skin:     General: Skin is warm  Coloration: Skin is not pale  Findings: No rash  Neurological:      General: No focal deficit present  Mental Status: She is alert  Motor: No abnormal muscle tone  Gait: Gait normal    Psychiatric:         Mood and Affect: Mood normal          Behavior: Behavior normal          Thought Content:  Thought content normal          Judgment: Judgment normal

## 2022-09-01 NOTE — ASSESSMENT & PLAN NOTE
Bp great today with med switch to Olmesartan, LE edema resolved with D/C of CCB, BMP wnl, con't current ARB, con't checking BP at home and call with consistent readings > 140/90,  recheck in office in 4-6 mos

## 2022-09-18 ENCOUNTER — NURSE TRIAGE (OUTPATIENT)
Dept: OTHER | Facility: OTHER | Age: 77
End: 2022-09-18

## 2022-09-19 ENCOUNTER — TELEMEDICINE (OUTPATIENT)
Dept: FAMILY MEDICINE CLINIC | Facility: HOSPITAL | Age: 77
End: 2022-09-19
Payer: MEDICARE

## 2022-09-19 DIAGNOSIS — U07.1 COVID-19: Primary | ICD-10-CM

## 2022-09-19 PROCEDURE — 99214 OFFICE O/P EST MOD 30 MIN: CPT | Performed by: INTERNAL MEDICINE

## 2022-09-19 RX ORDER — NIRMATRELVIR AND RITONAVIR 300-100 MG
3 KIT ORAL 2 TIMES DAILY
Qty: 30 TABLET | Refills: 0 | Status: SHIPPED | OUTPATIENT
Start: 2022-09-19 | End: 2022-09-24

## 2022-09-19 NOTE — TELEPHONE ENCOUNTER
Reason for Disposition   Health Information question, no triage required and triager able to answer question    Answer Assessment - Initial Assessment Questions  1   REASON FOR CALL or QUESTION: "What is your reason for calling today?" or "How can I best help you?" or "What question do you have that I can help answer       I tested neg for covid but my  is pos    Protocols used: INFORMATION ONLY CALL - NO TRIAGE-ADULT-

## 2022-09-19 NOTE — TELEPHONE ENCOUNTER
Regarding: covid concern (1 of 2)   ----- Message from Joby Vee sent at 9/18/2022  8:30 PM EDT -----  "I'm calling back because I wanted to make sure that my name was still on the queue "  ----- Message from Bright Quiroz sent at 9/18/2022  6:41 PM EDT -----  " I have a sore throat, cough and congestion   I took a covid test it says its negative but im not sure my  tested positive  "

## 2022-09-19 NOTE — PROGRESS NOTES
COVID-19 Outpatient Progress Note    Assessment/Plan:    Problem List Items Addressed This Visit    None     Visit Diagnoses     COVID-19    -  Primary    Relevant Medications    nirmatrelvir & ritonavir (Paxlovid, 300/100,) tablet therapy pack         Disposition:     Patient has asymptomatic or mild COVID-19 infection  Based off CDC guidelines, they were recommended to isolate for 5 days  If they are asymptomatic or symptoms are improving with no fevers in the past 24 hours, isolation may be ended followed by 5 days of wearing a mask when around othes to minimize risk of infecting others  If still have a fever or other symptoms have not improved, continue to isolate until they improve  Regardless of when they end isolation, avoid being around people who are more likely to get very sick from COVID-19 until at least day 11  Discussed symptom directed medication options with patient  Discussed vitamin D, vitamin C, and/or zinc supplementation with patient  She was advised to self isolate and to avoid all public places/transportation/stores for 5 +5 = 10 days  She was encouraged to wear a mask when around other household contacts that are not positive  She was advised to check temp and O2 sat 2-3 x's a day and to call with any temp > 100 3 OR with O2 sat <90% OR with any new/worse symptoms      Patient meets criteria for PAXLOVID and they have been counseled appropriately according to EUA documentation released by the FDA  After discussion, patient agrees to treatment  Nayana Kinsey is an investigational medicine used to treat mild-to-moderate COVID-19 in adults and children (15years of age and older weighing at least 80 pounds (40 kg)) with positive results of direct SARS-CoV-2 viral testing, and who are at high risk for progression to severe COVID-19, including hospitalization or death  PAXLOVID is investigational because it is still being studied   There is limited information about the safety and effectiveness of using PAXLOVID to treat people with mild-to-moderate COVID-19  The FDA has authorized the emergency use of PAXLOVID for the treatment of mild-tomoderate COVID-19 in adults and children (15years of age and older weighing at least 80 pounds (40 kg)) with a positive test for the virus that causes COVID-19, and who are at high risk for progression to severe COVID-19, including hospitalization or death, under an EUA  What should I tell my healthcare provider before I take PAXLOVID? Tell your healthcare provider if you:  - Have any allergies  - Have liver or kidney disease  - Are pregnant or plan to become pregnant  - Are breastfeeding a child  - Have any serious illnesses    Tell your healthcare provider about all the medicines you take, including prescription and over-the-counter medicines, vitamins, and herbal supplements  Some medicines may interact with PAXLOVID and may cause serious side effects  Keep a list of your medicines to show your healthcare provider and pharmacist when you get a new medicine  You can ask your healthcare provider or pharmacist for a list of medicines that interact with PAXLOVID  Do not start taking a new medicine without telling your healthcare provider  Your healthcare provider can tell you if it is safe to take PAXLOVID with other medicines  Tell your healthcare provider if you are taking combined hormonal contraceptive  PAXLOVID may affect how your birth control pills work  Females who are able to become pregnant should use another effective alternative form of contraception or an additional barrier method of contraception  Talk to your healthcare provider if you have any questions about contraceptive methods that might be right for you  How do I take PAXLOVID? PAXLOVID consists of 2 medicines: nirmatrelvir and ritonavir    - Take 2 pink tablets of nirmatrelvir with 1 white tablet of ritonavir by mouth 2 times each day (in the morning and in the evening) for 5 days  For each dose, take all 3 tablets at the same time  - If you have kidney disease, talk to your healthcare provider  You may need a different dose  - Swallow the tablets whole  Do not chew, break, or crush the tablets  - Take PAXLOVID with or without food  - Do not stop taking PAXLOVID without talking to your healthcare provider, even if you feel better  - If you miss a dose of PAXLOVID within 8 hours of the time it is usually taken, take it as soon as you remember  If you miss a dose by more than 8 hours, skip the missed dose and take the next dose at your regular time  Do not take 2 doses of PAXLOVID at the same time  - If you take too much PAXLOVID, call your healthcare provider or go to the nearest hospital emergency room right away  - If you are taking a ritonavir- or cobicistat-containing medicine to treat hepatitis C or Human Immunodeficiency Virus (HIV), you should continue to take your medicine as prescribed by your healthcare provider   - Talk to your healthcare provider if you do not feel better or if you feel worse after 5 days  Who should generally not take PAXLOVID? Do not take PAXLOVID if:  You are allergic to nirmatrelvir, ritonavir, or any of the ingredients in PAXLOVID  You are taking any of the following medicines:  - Alfuzosin  - Pethidine, piroxicam, propoxyphene  - Ranolazine  - Amiodarone, dronedarone, flecainide, propafenone, quinidine  - Colchicine  - Lurasidone, pimozide, clozapine  - Dihydroergotamine, ergotamine, methylergonovine  - Lovastatin, simvastatin  - Sildenafil (Revatio®) for pulmonary arterial hypertension (PAH)  - Triazolam, oral midazolam  - Apalutamide  - Carbamazepine, phenobarbital, phenytoin  - Rifampin  - St  Brocks Wort (hypericum perforatum)    What are the important possible side effects of PAXLOVID? Possible side effects of PAXLOVID are:  - Liver Problems   Tell your healthcare provider right away if you have any of these signs and symptoms of liver problems: loss of appetite, yellowing of your skin and the whites of eyes (jaundice), dark-colored urine, pale colored stools and itchy skin, stomach area (abdominal) pain  - Resistance to HIV Medicines  If you have untreated HIV infection, PAXLOVID may lead to some HIV medicines not working as well in the future  - Other possible side effects include: altered sense of taste, diarrhea, high blood pressure, or muscle aches    These are not all the possible side effects of PAXLOVID  Not many people have taken PAXLOVID  Serious and unexpected side effects may happen  Elizabeth Anthony is still being studied, so it is possible that all of the risks are not known at this time  What other treatment choices are there? Like [facility]el Lincoln may allow for the emergency use of other medicines to treat people with COVID-19  Go to https://Kamelio/ for information on the emergency use of other medicines that are authorized by FDA to treat people with COVID-19  Your healthcare provider may talk with you about clinical trials for which you may be eligible  It is your choice to be treated or not to be treated with PAXLOVID  Should you decide not to receive it or for your child not to receive it, it will not change your standard medical care  What if I am pregnant or breastfeeding? There is no experience treating pregnant women or breastfeeding mothers with PAXLOVID  For a mother and unborn baby, the benefit of taking PAXLOVID may be greater than the risk from the treatment  If you are pregnant, discuss your options and specific situation with your healthcare provider  It is recommended that you use effective barrier contraception or do not have sexual activity while taking PAXLOVID  If you are breastfeeding, discuss your options and specific situation with your healthcare provider       How do I report side effects with PAXLOVID? Contact your healthcare provider if you have any side effects that bother you or do not go away  Report side effects to FDA MedWatch at www fda gov/medwatch or call 8-910-HSU7511 or you can report side effects to [facility]  at the contact information provided below  Website Fax number Telephone number   [employer] [phone]     How should I store Nayana Kinsey? Store PAXLOVID tablets at room temperature between 68°F to 77°F (20°C to 25°C)  Full fact sheet for patients, parents, and caregivers can be found at: http://Idibon/    I have spent 15 minutes directly with the patient  Greater than 50% of this time was spent in counseling/coordination of care regarding: diagnostic results, prognosis, risks and benefits of treatment options, instructions for management, patient and family education, importance of treatment compliance and impressions  Encounter provider: Estefany Scott DO     Provider located at: [facility] [facility] [address] [facility] [address]     Recent Visits  No visits were found meeting these conditions  Showing recent visits within past 7 days and meeting all other requirements  Today's Visits  Date Type Provider Dept   09/19/22 Telemedicine Estefany Scott DO [facility] 0   Showing today's visits and meeting all other requirements  Future Appointments  No visits were found meeting these conditions  Showing future appointments within next 150 days and meeting all other requirements     This virtual check-in was done via [employer] and patient was informed that this is a secure, HIPAA-compliant platform  She agrees to proceed      Patient agrees to participate in a virtual check in via telephone or video visit instead of presenting to the office to address urgent/immediate medical needs  Patient is aware this is a billable service  She acknowledged consent and understanding of privacy and security of the video platform  The patient has agreed to participate and understands they can discontinue the visit at any time  After connecting through [facility], the patient was identified by name and date of birth  Liana Estrada was informed that this was a telemedicine visit and that the exam was being conducted confidentially over secure lines  My office door was closed  No one else was in the room  Liana Estrada acknowledged consent and understanding of privacy and security of the telemedicine visit  I informed the patient that I have reviewed her record in Epic and presented the opportunity for her to ask any questions regarding the visit today  The patient agreed to participate  Verification of patient location:  Patient is located in the following state in which I hold an active license: [place]    Subjective:   Liana Estrada is a 78 y o  female who has been screened for COVID-19  Symptom change since last report: unchanged  Patient's symptoms include fatigue, nasal congestion, rhinorrhea, sore throat, cough, myalgias and headache  Patient denies fever, chills, shortness of breath, chest tightness, abdominal pain, nausea, vomiting and diarrhea  - Date of symptom onset: 9/18/2022  - Date of exposure: 9/14/2022  - Date of positive COVID-19 test: 9/19/2022  Type of test: Home antigen  Patient with typical symptoms of COVID-19 and they attest that they were positive on home rapid antigen testing  Image of positive result is not able to be uploaded into their chart  COVID-19 vaccination status: Fully vaccinated (primary series)    Christiano Ware has been staying home and has isolated themselves in her home  She is taking care to not share personal items and is cleaning all surfaces that are touched often, like counters, tabletops, and doorknobs using household cleaning sprays or wipes   She is wearing a mask when she leaves her room  No results found for: Radha Basilio, [address] [facility], CORONAVIRUSR, [place], [address] [facility]    Review of Systems   Constitutional: Positive for fatigue  Negative for chills and fever  HENT: Positive for congestion, rhinorrhea and sore throat  Respiratory: Positive for cough  Negative for chest tightness and shortness of breath  Gastrointestinal: Negative for abdominal pain, diarrhea, nausea and vomiting  Musculoskeletal: Positive for myalgias  Neurological: Positive for headaches  Current Outpatient Medications on File Prior to Visit   Medication Sig    betamethasone, augmented, (DIPROLENE) 0 05 % ointment APPLY TO RASH ON UPPER/ LOWER EXTREMITIES TWICE DAILY X10-14 DAYS (DO NOT USE ON FACE OR BODY FOLDS)    estradiol (Yuvafem) 10 MCG TABS vaginal tablet Insert 1 tablet (10 mcg total) into the vagina 2 (two) times a week At bedtime    latanoprost (XALATAN) 0 005 % ophthalmic solution INSTILL 1 DROP INTO BOTH EYES AT BEDTIME    olmesartan (BENICAR) 5 mg tablet TAKE 1 TABLET (5 MG TOTAL) BY MOUTH DAILY   zolpidem (AMBIEN) 5 mg tablet Take   5-1 tab as needed for sleep       Objective:    LMP  (LMP Unknown)      Physical Exam  Vitals and nursing note reviewed  Constitutional:       General: She is not in acute distress  Appearance: She is not ill-appearing  HENT:      Head: Normocephalic and atraumatic  Eyes:      General:         Right eye: No discharge  Left eye: No discharge  Conjunctiva/sclera: Conjunctivae normal    Pulmonary:      Effort: Pulmonary effort is normal  No respiratory distress  Skin:     Coloration: Skin is not pale  Findings: No rash  Psychiatric:         Behavior: Behavior normal          Thought Content:  Thought content normal          Judgment: Judgment normal        Henry Linn DO

## 2022-09-28 ENCOUNTER — TELEPHONE (OUTPATIENT)
Dept: FAMILY MEDICINE CLINIC | Facility: HOSPITAL | Age: 77
End: 2022-09-28

## 2022-09-28 NOTE — TELEPHONE ENCOUNTER
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

## 2022-10-04 ENCOUNTER — OFFICE VISIT (OUTPATIENT)
Dept: PODIATRY | Facility: CLINIC | Age: 77
End: 2022-10-04
Payer: MEDICARE

## 2022-10-04 VITALS — WEIGHT: 122.2 LBS | BODY MASS INDEX: 21.65 KG/M2 | HEIGHT: 63 IN

## 2022-10-04 DIAGNOSIS — M20.41 HAMMERTOE OF RIGHT FOOT: Primary | ICD-10-CM

## 2022-10-04 DIAGNOSIS — S93.104A TOE DISLOCATION, RIGHT, INITIAL ENCOUNTER: ICD-10-CM

## 2022-10-04 PROCEDURE — 99214 OFFICE O/P EST MOD 30 MIN: CPT | Performed by: PODIATRIST

## 2022-10-04 NOTE — PROGRESS NOTES
Assessment/Plan:         Diagnoses and all orders for this visit:    Hammertoe of right foot  -     Case request operating room: AMPUTATION TOE Right Second; Standing  -     Case request operating room: AMPUTATION TOE Right Second    Toe dislocation, right, initial encounter  -     Case request operating room: AMPUTATION TOE Right Second; Standing  -     Case request operating room: AMPUTATION TOE Right Second    Other orders  -     Incentive spirometry; Standing  -     Insert and maintain IV line; Standing  -     Void On-Call to O R ; Standing  -     Place sequential compression device; Standing      Diagnosis and options discussed with patient  Patient agreeable to the plan as stated below      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  She does have contracted hammertoes of the 3rd 4th and 5th but they are asymptomatic  At her initial visit a few months ago we did discuss various options of reconstruction  At this point her only symptomatic digit is the dislocated 2nd toe and she would like to proceed with amputation of the digit  The reconstruction would be quite extensive and high degree of complication and at this point given the toe is nonfunctional she feels she can simply have it removed and get on with her day-to-day life  This is a reasonable option for this type of deformity although we did discuss multiple conservative and reconstructive options as well  Consent was signed for right 2nd toe amputation    Appropriate postop medication discussed, educated patient on Percocet and its risks  Patient will be sent for preoperative testing and clearance    Surgical procedure and recovery discussed as can best be predicted  Alternatives, risks, complications covered with the patient  Patient doen not need DVT prophylaxis for this procedure      Subjective:      Patient ID: Bharathi Cancer is a 78 y o  female  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    An initial consult we discussed some conservative things such as toe splints and padding to try to remove her discomfort  These are not working and the extra padding and splinting in the shoe is just causing more discomfort  We discussed reconstruction of the digit but there is a lengthy postop recovery period of nonweightbearing and in addition very high chance of complication or failure to improve  At this point patient is considered options and is requesting to proceed with the suggestion of amputation of the digit  The following portions of the patient's history were reviewed and updated as appropriate: allergies, current medications, past family history, past medical history, past social history, past surgical history and problem list     Review of Systems    Constitutional: Negative  HENT: Negative for sinus pressure and sinus pain  Respiratory: Negative for cough and shortness of breath  Cardiovascular: Negative for chest pain and leg swelling  Gastrointestinal: Negative for diarrhea, nausea and vomiting  Musculoskeletal: dislocated 2nd toe right foot   Skin: Negative for rash or wound  Neurological: Negative for weakness, numbness and headaches  Psychiatric/Behavioral: The patient is not nervous/anxious  Objective:      Ht 5' 3" (1 6 m)   Wt 55 4 kg (122 lb 3 2 oz)   LMP  (LMP Unknown)   BMI 21 65 kg/m²          Physical Exam  Vitals reviewed  Constitutional:       General: She is not in acute distress  Appearance: She is normal weight  She is not toxic-appearing  HENT:      Nose: No congestion or rhinorrhea  Cardiovascular:      Rate and Rhythm: Normal rate  Pulses: Normal pulses  Dorsalis pedis pulses are 2+ on the right side  Posterior tibial pulses are 2+ on the right side  Pulmonary:      Effort: Pulmonary effort is normal  No respiratory distress  Musculoskeletal:      Right foot: Decreased range of motion   Deformity (  The 2nd digit is unstable and chronically dorsal medially dislocated at the metatarsophalangeal joint  The 3rd 4th 5th digits are contracted at the proximal interphalangeal joints) present  No bunion or foot drop  Feet:      Right foot:      Protective Sensation: 10 sites tested  10 sites sensed  Skin integrity: Skin integrity normal    Skin:     Capillary Refill: Capillary refill takes less than 2 seconds  Findings: No erythema, lesion or rash  Neurological:      Mental Status: She is alert and oriented to person, place, and time  Sensory: No sensory deficit  Motor: No weakness        Gait: Gait normal    Psychiatric:         Mood and Affect: Mood normal

## 2022-10-06 ENCOUNTER — TELEPHONE (OUTPATIENT)
Dept: FAMILY MEDICINE CLINIC | Facility: HOSPITAL | Age: 77
End: 2022-10-06

## 2022-10-06 NOTE — TELEPHONE ENCOUNTER
Flu vaccine anytime they are off isolation and no longer with fever    COVID booster is not as clear cut - can technically get when out of isolation as well but some suggest waiting 3-6 mos d/t natural immunity to COVID after just having the virus itself - I'd say it is reasonable to get flu vaccine now and the COVID vaccine in Jan-Feb

## 2022-10-06 NOTE — TELEPHONE ENCOUNTER
Patient and  Sloan Libman - [birth year]) recently covid+  Now feeling better      Asking when they should get their flu and bi-valent covid shots?    pcb

## 2022-10-10 ENCOUNTER — OFFICE VISIT (OUTPATIENT)
Dept: FAMILY MEDICINE CLINIC | Facility: HOSPITAL | Age: 77
End: 2022-10-10
Payer: MEDICARE

## 2022-10-10 VITALS
HEIGHT: 63 IN | DIASTOLIC BLOOD PRESSURE: 86 MMHG | BODY MASS INDEX: 21.76 KG/M2 | WEIGHT: 122.8 LBS | HEART RATE: 92 BPM | SYSTOLIC BLOOD PRESSURE: 126 MMHG | TEMPERATURE: 97 F

## 2022-10-10 DIAGNOSIS — Z01.818 PREOP EXAMINATION: Primary | ICD-10-CM

## 2022-10-10 DIAGNOSIS — I10 PRIMARY HYPERTENSION: ICD-10-CM

## 2022-10-10 DIAGNOSIS — M20.41 HAMMERTOE OF SECOND TOE OF RIGHT FOOT: ICD-10-CM

## 2022-10-10 PROCEDURE — 99214 OFFICE O/P EST MOD 30 MIN: CPT | Performed by: NURSE PRACTITIONER

## 2022-10-10 NOTE — PROGRESS NOTES
[facility] PRE-OPERATIVE EVALUATION  [facility] PHYSICIAN GROUP - [facility] PRIMARY CARE [address]     NAME: Terrie Friedman  AGE: 78 y o  SEX: female  : 1945     DATE: 10/10/2022    [facility] Pre-Operative Evaluation      Chief Complaint: Pre-operative Evaluation     Surgery: right 2nd toe amputation  Anticipated Date of Surgery: 10/28/2022  Referring Provider: No ref  provider found       History of Present Illness:     Terrie Friedman is a 78 y o  female who presents to the office today for a preoperative consultation at the request of surgeon, Dr Curtis Hogue, who plans on performing right 2nd toe amputation on 10/28/2022  Planned anesthesia is IV sedation  Patient has a bleeding risk of: no recent abnormal bleeding, no remote history of abnormal bleeding and no use of Ca-channel blockers  Patient does not have objections to receiving blood products if needed  Current anti-platelet/anti-coagulation medications that the patient is prescribed includes: n/a  Assessment of Chronic Conditions:   - Hypertension: well controlled on olmesartan     Assessment of Cardiac Risk:  · Denies unstable or severe angina or MI in the last 6 weeks or history of stent placement in the last year   · Denies decompensated heart failure (e g  New onset heart failure, NYHA functional class IV heart failure, or worsening existing heart failure)  · Denies significant arrhythmias such as high grade AV block, symptomatic ventricular arrhythmia, newly recognized ventricular tachycardia, supraventricular tachycardia with resting heart rate >100, or symptomatic bradycardia  · Denies severe heart valve disease including aortic stenosis or symptomatic mitral stenosis     Exercise Capacity:  · Able to walk 4 blocks without symptoms?: Yes  · Able to walk 2 flights without symptoms?: Yes    Prior Anesthesia Reactions: No     Personal history of venous thromboembolic disease?  No    History of steroid use for >2 weeks within last year? No      Review of Systems:     Review of Systems   Constitutional: Negative  Respiratory: Negative  Cardiovascular: Negative  Gastrointestinal: Negative  Genitourinary: Negative  Musculoskeletal: Positive for arthralgias (right 2nd toe)  Neurological: Negative  Current Problem List:     Patient Active Problem List   Diagnosis   • Atrophy of vagina   • Cataracta   • Osteopenia   • Macrocytosis   • Insomnia   • Squamous cell skin cancer   • Primary hypertension       Allergies:     No Known Allergies    Current Medications:       Current Outpatient Medications:   •  betamethasone, augmented, (DIPROLENE) 0 05 % ointment, APPLY TO RASH ON UPPER/ LOWER EXTREMITIES TWICE DAILY X10-14 DAYS (DO NOT USE ON FACE OR BODY FOLDS), Disp: , Rfl: 1  •  estradiol (Yuvafem) 10 MCG TABS vaginal tablet, Insert 1 tablet (10 mcg total) into the vagina 2 (two) times a week At bedtime, Disp: 24 tablet, Rfl: 3  •  latanoprost (XALATAN) 0 005 % ophthalmic solution, INSTILL 1 DROP INTO BOTH EYES AT BEDTIME, Disp: , Rfl:   •  olmesartan (BENICAR) 5 mg tablet, TAKE 1 TABLET (5 MG TOTAL) BY MOUTH DAILY  , Disp: 90 tablet, Rfl: 1  •  zolpidem (AMBIEN) 5 mg tablet, Take   5-1 tab as needed for sleep, Disp: 90 tablet, Rfl: 0    Past Medical History:       Past Medical History:   Diagnosis Date   • Breast asymmetry     Resolved: Nov 9, 2017   • Causalgia of upper limb     last assessed: Jan 29, 2014   • 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[facility])    • Transaminitis     Last assessed: Mike 3, 2014   • Varicose veins with pain, unspecified laterality     Resolved: June 6, 2017   • Vasovagal syncope     last assessed: Dec 30, 2013        Past Surgical History:   Procedure Laterality Date   • BREAST BIOPSY      unsure laterality or date of biopsy   • CATARACT EXTRACTION, BILATERAL Bilateral 2018 january and april   • COLONOSCOPY      Complete - [place] 2/13/13 - Hemorrhoids, repeat 5 years d/t family h/o colon cancer    • FOOT SURGERY     • MOUTH SURGERY      Tooth Extraction - last assessed: Aug 19, 2014   • MOUTH SURGERY N/A 11/2017   • SKIN BIOPSY Right 07/2018    right thigh   • TONSILLECTOMY      last assessed: Aug 19, 2014        Family History   Problem Relation Age of Onset   • Colon cancer Mother         diagnosed in her 45s and 66's   • Endometrial cancer Mother         63's   • Lung cancer Father 80   • Colon cancer Brother 64   • No Known Problems Sister    • No Known Problems Maternal Grandmother    • No Known Problems Maternal Grandfather    • No Known Problems Paternal Grandmother    • No Known Problems Paternal Grandfather    • Colon cancer Maternal Aunt         unknown age of onset   • No Known Problems Maternal Aunt    • No Known Problems Paternal Aunt    • No Known Problems Paternal Aunt    • No Known Problems Paternal Aunt    • No Known Problems Paternal Aunt    • No Known Problems Paternal Aunt         Social History     Socioeconomic History   • Marital status: /Civil Union     Spouse name: Not on file   • Number of children: 2   • Years of education: Not on file   • Highest education level: Not on file   Occupational History   • Not on file   Tobacco Use   • Smoking status: Never Smoker   • Smokeless tobacco: Never Used   Vaping Use   • Vaping Use: Never used   Substance and Sexual Activity   • Alcohol use:  Yes     Alcohol/week: 5 0 - 6 0 standard drinks     Types: 5 - 6 Glasses of wine per week     Comment: 1 per day - social/ drinks wine    • Drug use: No   • Sexual activity: Yes     Partners: Male   Other Topics Concern   • Not on file   Social History Narrative    Always uses seat belt     Caffeine use     [facility]      Social Determinants of Health     Financial Resource Strain: Not on file   Food Insecurity: Not on file   Transportation Needs: Not on file   Physical Activity: Not on file   Stress: Not on file   Social Connections: Not on file   Intimate Partner Violence: Not on file Housing Stability: Not on file        Physical Exam:     /86   Pulse 92   Temp (!) 97 °F (36 1 °C) (Tympanic)   Ht 5' 3" (1 6 m)   Wt 55 7 kg (122 lb 12 8 oz)   LMP  (LMP Unknown)   BMI 21 75 kg/m²     Physical Exam  Vitals reviewed  Constitutional:       General: She is not in acute distress  Appearance: Normal appearance  She is normal weight  HENT:      Head: Normocephalic  Right Ear: Tympanic membrane normal       Left Ear: Tympanic membrane normal       Nose: Nose normal       Mouth/Throat:      Mouth: Mucous membranes are moist       Pharynx: Oropharynx is clear  Eyes:      General: No scleral icterus  Neck:      Thyroid: No thyromegaly  Cardiovascular:      Rate and Rhythm: Normal rate and regular rhythm  Heart sounds: No murmur heard  Pulmonary:      Effort: Pulmonary effort is normal  No respiratory distress  Breath sounds: Normal breath sounds  Abdominal:      General: Abdomen is flat  Bowel sounds are normal       Palpations: Abdomen is soft  Tenderness: There is no abdominal tenderness  There is no guarding or rebound  Musculoskeletal:      Cervical back: Normal range of motion  Right lower leg: No edema  Left lower leg: No edema  Lymphadenopathy:      Cervical: No cervical adenopathy  Skin:     General: Skin is warm and dry  Neurological:      General: No focal deficit present  Mental Status: She is alert and oriented to person, place, and time  Psychiatric:         Mood and Affect: Mood normal          Behavior: Behavior normal        Data:     Pre-operative work-up    Laboratory Results: n/a     EKG: n/a    Chest x-ray: n/a    Previous cardiopulmonary studies within the past year:  · Echocardiogram: n/a  · Cardiac Catheterization: n/a  · Stress Test: n/a  · Pulmonary Function Testing: n/a      Assessment & Recommendations:     1  Preop examination      preop H&P completed and medical clearance issued   2   Hammertoe of second toe of right foot     3  Primary hypertension      Well controlled on daily olmesartan  Maintain f/u with Dr Jose Stoll as planned       Pre-Op Evaluation Assessment  78 y o  female with planned surgery: right 2nd toe amputation  Known risk factors for perioperative complications: None  Current medications which may produce withdrawal symptoms if withheld perioperatively: none  Pre-Op Evaluation Plan  1  Further preoperative workup as follows:   - None; no further preoperative work-up is required    2  Medication Management/Recommendations:   - None, continue medication regimen including morning of surgery, with sip of water    3  Prophylaxis for cardiac events with perioperative beta-blockers: not indicated  4  Patient requires further consultation with: None    Clearance  Patient is CLEARED for surgery without any additional cardiac testing       MADY Wang  [facility] PRIMARY CARE [address] [address]  Phone#  [phone]  Fax#  [phone]

## 2022-10-10 NOTE — H&P (VIEW-ONLY)
[facility] PRE-OPERATIVE EVALUATION  [facility] PHYSICIAN GROUP - [facility] [address] PRIMARY CARE [address]     NAME: Lisa Lauren  AGE: 78 y o  SEX: female  : 1945     DATE: 10/10/2022    [facility] Pre-Operative Evaluation      Chief Complaint: Pre-operative Evaluation     Surgery: right 2nd toe amputation  Anticipated Date of Surgery: 10/28/2022  Referring Provider: No ref  provider found       History of Present Illness:     Lisa Lauren is a 78 y o  female who presents to the office today for a preoperative consultation at the request of surgeon, Dr Wiliam Wood, who plans on performing right 2nd toe amputation on 10/28/2022  Planned anesthesia is IV sedation  Patient has a bleeding risk of: no recent abnormal bleeding, no remote history of abnormal bleeding and no use of Ca-channel blockers  Patient does not have objections to receiving blood products if needed  Current anti-platelet/anti-coagulation medications that the patient is prescribed includes: n/a  Assessment of Chronic Conditions:   - Hypertension: well controlled on olmesartan     Assessment of Cardiac Risk:  · Denies unstable or severe angina or MI in the last 6 weeks or history of stent placement in the last year   · Denies decompensated heart failure (e g  New onset heart failure, NYHA functional class IV heart failure, or worsening existing heart failure)  · Denies significant arrhythmias such as high grade AV block, symptomatic ventricular arrhythmia, newly recognized ventricular tachycardia, supraventricular tachycardia with resting heart rate >100, or symptomatic bradycardia  · Denies severe heart valve disease including aortic stenosis or symptomatic mitral stenosis     Exercise Capacity:  · Able to walk 4 blocks without symptoms?: Yes  · Able to walk 2 flights without symptoms?: Yes    Prior Anesthesia Reactions: No     Personal history of venous thromboembolic disease?  No    History of steroid use for >2 weeks within last year? No      Review of Systems:     Review of Systems   Constitutional: Negative  Respiratory: Negative  Cardiovascular: Negative  Gastrointestinal: Negative  Genitourinary: Negative  Musculoskeletal: Positive for arthralgias (right 2nd toe)  Neurological: Negative  Current Problem List:     Patient Active Problem List   Diagnosis   • Atrophy of vagina   • Cataracta   • Osteopenia   • Macrocytosis   • Insomnia   • Squamous cell skin cancer   • Primary hypertension       Allergies:     No Known Allergies    Current Medications:       Current Outpatient Medications:   •  betamethasone, augmented, (DIPROLENE) 0 05 % ointment, APPLY TO RASH ON UPPER/ LOWER EXTREMITIES TWICE DAILY X10-14 DAYS (DO NOT USE ON FACE OR BODY FOLDS), Disp: , Rfl: 1  •  estradiol (Yuvafem) 10 MCG TABS vaginal tablet, Insert 1 tablet (10 mcg total) into the vagina 2 (two) times a week At bedtime, Disp: 24 tablet, Rfl: 3  •  latanoprost (XALATAN) 0 005 % ophthalmic solution, INSTILL 1 DROP INTO BOTH EYES AT BEDTIME, Disp: , Rfl:   •  olmesartan (BENICAR) 5 mg tablet, TAKE 1 TABLET (5 MG TOTAL) BY MOUTH DAILY  , Disp: 90 tablet, Rfl: 1  •  zolpidem (AMBIEN) 5 mg tablet, Take   5-1 tab as needed for sleep, Disp: 90 tablet, Rfl: 0    Past Medical History:       Past Medical History:   Diagnosis Date   • Breast asymmetry     Resolved: Nov 9, 2017   • Causalgia of upper limb     last assessed: Jan 29, 2014   • 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[facility])    • Transaminitis     Last assessed: Mike 3, 2014   • Varicose veins with pain, unspecified laterality     Resolved: June 6, 2017   • Vasovagal syncope     last assessed: Dec 30, 2013        Past Surgical History:   Procedure Laterality Date   • BREAST BIOPSY      unsure laterality or date of biopsy   • CATARACT EXTRACTION, BILATERAL Bilateral 2018 january and april   • COLONOSCOPY      Complete - [place] 2/13/13 - Hemorrhoids, repeat 5 years d/t family h/o colon cancer    • FOOT SURGERY     • MOUTH SURGERY      Tooth Extraction - last assessed: Aug 19, 2014   • MOUTH SURGERY N/A 11/2017   • SKIN BIOPSY Right 07/2018    right thigh   • TONSILLECTOMY      last assessed: Aug 19, 2014        Family History   Problem Relation Age of Onset   • Colon cancer Mother         diagnosed in her 45s and 66's   • Endometrial cancer Mother         63's   • Lung cancer Father 80   • Colon cancer Brother 64   • No Known Problems Sister    • No Known Problems Maternal Grandmother    • No Known Problems Maternal Grandfather    • No Known Problems Paternal Grandmother    • No Known Problems Paternal Grandfather    • Colon cancer Maternal Aunt         unknown age of onset   • No Known Problems Maternal Aunt    • No Known Problems Paternal Aunt    • No Known Problems Paternal Aunt    • No Known Problems Paternal Aunt    • No Known Problems Paternal Aunt    • No Known Problems Paternal Aunt         Social History     Socioeconomic History   • Marital status: /Civil Union     Spouse name: Not on file   • Number of children: 2   • Years of education: Not on file   • Highest education level: Not on file   Occupational History   • Not on file   Tobacco Use   • Smoking status: Never Smoker   • Smokeless tobacco: Never Used   Vaping Use   • Vaping Use: Never used   Substance and Sexual Activity   • Alcohol use:  Yes     Alcohol/week: 5 0 - 6 0 standard drinks     Types: 5 - 6 Glasses of wine per week     Comment: 1 per day - social/ drinks wine    • Drug use: No   • Sexual activity: Yes     Partners: Male   Other Topics Concern   • Not on file   Social History Narrative    Always uses seat belt     Caffeine use     [facility]      Social Determinants of Health     Financial Resource Strain: Not on file   Food Insecurity: Not on file   Transportation Needs: Not on file   Physical Activity: Not on file   Stress: Not on file   Social Connections: Not on file   Intimate Partner Violence: Not on file Housing Stability: Not on file        Physical Exam:     /86   Pulse 92   Temp (!) 97 °F (36 1 °C) (Tympanic)   Ht 5' 3" (1 6 m)   Wt 55 7 kg (122 lb 12 8 oz)   LMP  (LMP Unknown)   BMI 21 75 kg/m²     Physical Exam  Vitals reviewed  Constitutional:       General: She is not in acute distress  Appearance: Normal appearance  She is normal weight  HENT:      Head: Normocephalic  Right Ear: Tympanic membrane normal       Left Ear: Tympanic membrane normal       Nose: Nose normal       Mouth/Throat:      Mouth: Mucous membranes are moist       Pharynx: Oropharynx is clear  Eyes:      General: No scleral icterus  Neck:      Thyroid: No thyromegaly  Cardiovascular:      Rate and Rhythm: Normal rate and regular rhythm  Heart sounds: No murmur heard  Pulmonary:      Effort: Pulmonary effort is normal  No respiratory distress  Breath sounds: Normal breath sounds  Abdominal:      General: Abdomen is flat  Bowel sounds are normal       Palpations: Abdomen is soft  Tenderness: There is no abdominal tenderness  There is no guarding or rebound  Musculoskeletal:      Cervical back: Normal range of motion  Right lower leg: No edema  Left lower leg: No edema  Lymphadenopathy:      Cervical: No cervical adenopathy  Skin:     General: Skin is warm and dry  Neurological:      General: No focal deficit present  Mental Status: She is alert and oriented to person, place, and time  Psychiatric:         Mood and Affect: Mood normal          Behavior: Behavior normal        Data:     Pre-operative work-up    Laboratory Results: n/a     EKG: n/a    Chest x-ray: n/a    Previous cardiopulmonary studies within the past year:  · Echocardiogram: n/a  · Cardiac Catheterization: n/a  · Stress Test: n/a  · Pulmonary Function Testing: n/a      Assessment & Recommendations:     1  Preop examination      preop H&P completed and medical clearance issued   2   Hammertoe of second toe of right foot     3  Primary hypertension      Well controlled on daily olmesartan  Maintain f/u with Dr Sandra Mccarthy as planned       Pre-Op Evaluation Assessment  78 y o  female with planned surgery: right 2nd toe amputation  Known risk factors for perioperative complications: None  Current medications which may produce withdrawal symptoms if withheld perioperatively: none  Pre-Op Evaluation Plan  1  Further preoperative workup as follows:   - None; no further preoperative work-up is required    2  Medication Management/Recommendations:   - None, continue medication regimen including morning of surgery, with sip of water    3  Prophylaxis for cardiac events with perioperative beta-blockers: not indicated  4  Patient requires further consultation with: None    Clearance  Patient is CLEARED for surgery without any additional cardiac testing       MADY Foster  [facility] PRIMARY CARE [address] [facility] [address]  Phone#  [phone]  Fax#  [phone]

## 2022-10-25 NOTE — PRE-PROCEDURE INSTRUCTIONS
Pre-Surgery Instructions:   Medication Instructions   • estradiol (Yuvafem) 10 MCG TABS vaginal tablet Uses PRN- DO NOT take day of surgery   • latanoprost (XALATAN) 0 005 % ophthalmic solution Take night before surgery   • olmesartan (BENICAR) 5 mg tablet Hold day of surgery  • zolpidem (AMBIEN) 5 mg tablet Uses PRN- OK to take day of surgery   Pre procedure instructions reviewed verbalizes understanding  NPO after MN  Bathing reviewed  Morning meds with water  No NSAIDS

## 2022-10-26 ENCOUNTER — ANESTHESIA EVENT (OUTPATIENT)
Dept: PERIOP | Facility: HOSPITAL | Age: 77
End: 2022-10-26
Payer: MEDICARE

## 2022-10-28 ENCOUNTER — HOSPITAL ENCOUNTER (OUTPATIENT)
Facility: HOSPITAL | Age: 77
Setting detail: OUTPATIENT SURGERY
Discharge: HOME | End: 2022-10-28
Attending: PODIATRIST | Admitting: PODIATRIST
Payer: MEDICARE

## 2022-10-28 ENCOUNTER — ANESTHESIA (OUTPATIENT)
Dept: PERIOP | Facility: HOSPITAL | Age: 77
End: 2022-10-28
Payer: MEDICARE

## 2022-10-28 ENCOUNTER — APPOINTMENT (OUTPATIENT)
Dept: RADIOLOGY | Facility: HOSPITAL | Age: 77
End: 2022-10-28
Payer: MEDICARE

## 2022-10-28 VITALS
BODY MASS INDEX: 20.55 KG/M2 | SYSTOLIC BLOOD PRESSURE: 120 MMHG | DIASTOLIC BLOOD PRESSURE: 59 MMHG | HEIGHT: 63 IN | RESPIRATION RATE: 18 BRPM | HEART RATE: 72 BPM | OXYGEN SATURATION: 99 % | TEMPERATURE: 97.4 F | WEIGHT: 116 LBS

## 2022-10-28 DIAGNOSIS — Z98.890 POST-OPERATIVE STATE: Primary | ICD-10-CM

## 2022-10-28 PROBLEM — F41.9 ANXIETY: Status: ACTIVE | Noted: 2022-10-28

## 2022-10-28 PROCEDURE — 73630 X-RAY EXAM OF FOOT: CPT

## 2022-10-28 RX ORDER — OXYCODONE HYDROCHLORIDE AND ACETAMINOPHEN 5; 325 MG/1; MG/1
1 TABLET ORAL EVERY 6 HOURS PRN
Qty: 12 TABLET | Refills: 0 | Status: SHIPPED | OUTPATIENT
Start: 2022-10-28 | End: 2022-11-07

## 2022-10-28 RX ORDER — FENTANYL CITRATE 50 UG/ML
INJECTION, SOLUTION INTRAMUSCULAR; INTRAVENOUS AS NEEDED
Status: DISCONTINUED | OUTPATIENT
Start: 2022-10-28 | End: 2022-10-28

## 2022-10-28 RX ORDER — ONDANSETRON 2 MG/ML
INJECTION INTRAMUSCULAR; INTRAVENOUS AS NEEDED
Status: DISCONTINUED | OUTPATIENT
Start: 2022-10-28 | End: 2022-10-28

## 2022-10-28 RX ORDER — ONDANSETRON 2 MG/ML
4 INJECTION INTRAMUSCULAR; INTRAVENOUS ONCE AS NEEDED
Status: DISCONTINUED | OUTPATIENT
Start: 2022-10-28 | End: 2022-10-28 | Stop reason: HOSPADM

## 2022-10-28 RX ORDER — LIDOCAINE HYDROCHLORIDE 10 MG/ML
INJECTION, SOLUTION EPIDURAL; INFILTRATION; INTRACAUDAL; PERINEURAL AS NEEDED
Status: DISCONTINUED | OUTPATIENT
Start: 2022-10-28 | End: 2022-10-28

## 2022-10-28 RX ORDER — DEXAMETHASONE SODIUM PHOSPHATE 10 MG/ML
INJECTION, SOLUTION INTRAMUSCULAR; INTRAVENOUS AS NEEDED
Status: DISCONTINUED | OUTPATIENT
Start: 2022-10-28 | End: 2022-10-28

## 2022-10-28 RX ORDER — CEFAZOLIN SODIUM 1 G/50ML
1000 SOLUTION INTRAVENOUS ONCE
Status: COMPLETED | OUTPATIENT
Start: 2022-10-28 | End: 2022-10-28

## 2022-10-28 RX ORDER — OXYCODONE HYDROCHLORIDE AND ACETAMINOPHEN 5; 325 MG/1; MG/1
1 TABLET ORAL EVERY 4 HOURS PRN
Status: DISCONTINUED | OUTPATIENT
Start: 2022-10-28 | End: 2022-10-28 | Stop reason: HOSPADM

## 2022-10-28 RX ORDER — MAGNESIUM HYDROXIDE 1200 MG/15ML
LIQUID ORAL AS NEEDED
Status: DISCONTINUED | OUTPATIENT
Start: 2022-10-28 | End: 2022-10-28 | Stop reason: HOSPADM

## 2022-10-28 RX ORDER — HYDROMORPHONE HCL IN WATER/PF 6 MG/30 ML
0.2 PATIENT CONTROLLED ANALGESIA SYRINGE INTRAVENOUS
Status: DISCONTINUED | OUTPATIENT
Start: 2022-10-28 | End: 2022-10-28 | Stop reason: HOSPADM

## 2022-10-28 RX ORDER — PROPOFOL 10 MG/ML
INJECTION, EMULSION INTRAVENOUS CONTINUOUS PRN
Status: DISCONTINUED | OUTPATIENT
Start: 2022-10-28 | End: 2022-10-28

## 2022-10-28 RX ORDER — FENTANYL CITRATE/PF 50 MCG/ML
25 SYRINGE (ML) INJECTION
Status: DISCONTINUED | OUTPATIENT
Start: 2022-10-28 | End: 2022-10-28 | Stop reason: HOSPADM

## 2022-10-28 RX ORDER — PROPOFOL 10 MG/ML
INJECTION, EMULSION INTRAVENOUS AS NEEDED
Status: DISCONTINUED | OUTPATIENT
Start: 2022-10-28 | End: 2022-10-28

## 2022-10-28 RX ORDER — SODIUM CHLORIDE, SODIUM LACTATE, POTASSIUM CHLORIDE, CALCIUM CHLORIDE 600; 310; 30; 20 MG/100ML; MG/100ML; MG/100ML; MG/100ML
50 INJECTION, SOLUTION INTRAVENOUS CONTINUOUS
Status: DISCONTINUED | OUTPATIENT
Start: 2022-10-28 | End: 2022-10-28 | Stop reason: HOSPADM

## 2022-10-28 RX ADMIN — PROPOFOL 50 MG: 10 INJECTION, EMULSION INTRAVENOUS at 09:42

## 2022-10-28 RX ADMIN — CEFAZOLIN SODIUM 1000 MG: 1 SOLUTION INTRAVENOUS at 09:34

## 2022-10-28 RX ADMIN — FENTANYL CITRATE 50 MCG: 50 INJECTION, SOLUTION INTRAMUSCULAR; INTRAVENOUS at 09:41

## 2022-10-28 RX ADMIN — SODIUM CHLORIDE, SODIUM LACTATE, POTASSIUM CHLORIDE, AND CALCIUM CHLORIDE: .6; .31; .03; .02 INJECTION, SOLUTION INTRAVENOUS at 09:22

## 2022-10-28 RX ADMIN — FENTANYL CITRATE 25 MCG: 50 INJECTION, SOLUTION INTRAMUSCULAR; INTRAVENOUS at 09:45

## 2022-10-28 RX ADMIN — ONDANSETRON 4 MG: 2 INJECTION INTRAMUSCULAR; INTRAVENOUS at 10:01

## 2022-10-28 RX ADMIN — DEXAMETHASONE SODIUM PHOSPHATE 10 MG: 10 INJECTION, SOLUTION INTRAMUSCULAR; INTRAVENOUS at 09:45

## 2022-10-28 RX ADMIN — FENTANYL CITRATE 25 MCG: 50 INJECTION, SOLUTION INTRAMUSCULAR; INTRAVENOUS at 09:43

## 2022-10-28 RX ADMIN — LIDOCAINE HYDROCHLORIDE 50 MG: 10 INJECTION, SOLUTION EPIDURAL; INFILTRATION; INTRACAUDAL at 09:42

## 2022-10-28 RX ADMIN — PROPOFOL 40 MCG/KG/MIN: 10 INJECTION, EMULSION INTRAVENOUS at 09:43

## 2022-10-28 NOTE — OP NOTE
OPERATIVE REPORT - Podiatry  PATIENT NAME: Shawn Velez    :  1945  MRN: [id number]  Pt Location:  OR ROOM 11    SURGERY DATE: 10/28/2022    Surgeon(s) and Role:     * Baldemar Jones DPM - Primary     * Trudy Lynn DPM - Assisting    Pre-op Diagnosis:  Toe dislocation, right, initial encounter [S93 104A]  Hammertoe of right foot [M20 41]    Post-Op Diagnosis Codes:     * Toe dislocation, right, initial encounter [S93 104A]     * Hammertoe of right foot [M20 41]    Procedure(s) (LRB):  AMPUTATION TOE Right Second (Right)    Specimen(s):  * No specimens in log *    Estimated Blood Loss:   0 mL    Drains:  * No LDAs found *    Anesthesia Type:   IV Sedation with Anesthesia with 10 ml of 1% Lidocaine and 0 5% Bupivacaine in a 1:1 mixture prior to incision  Hemostasis:  Anatomic dissection, electrocautery    Materials:  Nylon    Operative Findings:  Consistent with diagnosis    Complications:   None    Procedure and Technique:     Under mild sedation, the patient was brought into the operating room and placed on the operating room table in the supine position  IV sedation was achieved by anesthesia team and a universal timeout was performed where all parties are in agreement of correct patient, correct procedure and correct site  A local block was performed consisting of 10 ml of 1% Lidocaine and 0 5% Bupivacaine in a 1:1 mixture  The foot was then prepped and draped in the usual aseptic manner  Attention was directed to the right, 2nd toe where a fishmouth type of incision was made  Utilizing a sterile 15 blade, this incision was carried deep straight to bone  Soft tissue structures were then reflected off the 2nd MTPJ  Utilizing a sterile 15 blade, all capsular structures were severed and the toe was then disarticulated at the level of the 2nd MTPJ and then placed on the back table  It was noted that all tissue margins were bleeding and viable      The surgical incision was irrigated with copious amounts of normal sterile saline  Skin edges were reapproximated and closure was obtained utilizing 4-0  Nylon  The foot was then cleansed and dried  The incision site was dressed with Xeroform and a dry sterile dressing  The patient tolerated the procedure and anesthesia well and was transported to the PACU with vital signs stable  Dr Nanci Rossi was present during the entire procedure and participated in all key aspects  SIGNATURE: Gege Mcnulty  DATE: October 28, 2022  TIME: 10:09 AM      Portions of the record may have been created with voice recognition software  Occasional wrong word or "sound a like" substitutions may have occurred due to the inherent limitations of voice recognition software  Read the chart carefully and recognize, using context, where substitutions have occurred

## 2022-10-28 NOTE — INTERVAL H&P NOTE
H&P reviewed  After examining the patient I find no changes in the patients condition since the H&P had been written      Vitals:    10/28/22 0653   BP: 139/62   Pulse: 79   Resp: 18   Temp: (!) 97 2 °F (36 2 °C)   SpO2: 97%

## 2022-10-28 NOTE — ANESTHESIA POSTPROCEDURE EVALUATION
Post-Op Assessment Note    CV Status:  Stable  Pain Score: 0    Pain management: adequate     Mental Status:  Alert and awake   Hydration Status:  Euvolemic   PONV Controlled:  Controlled   Airway Patency:  Patent      Post Op Vitals Reviewed: Yes      Staff: CRNA         No complications documented      BP   125/61   Temp  98 3   Pulse  85   Resp   16   SpO2   100%

## 2022-10-28 NOTE — ANESTHESIA PREPROCEDURE EVALUATION
Procedure:  AMPUTATION TOE Right Second (Right Foot)    Relevant Problems   CARDIO   (+) Primary hypertension      ENDO (within normal limits)      GI/HEPATIC (within normal limits)      /RENAL (within normal limits)      MUSCULOSKELETAL (within normal limits)      NEURO/PSYCH   (+) Anxiety      PULMONARY (within normal limits)   (-) Asthma   (-) Sleep apnea   (-) Smoking      Other   (+) Insomnia        Physical Exam    Airway    Mallampati score: II  TM Distance: >3 FB  Neck ROM: full     Dental       Cardiovascular  Cardiovascular exam normal    Pulmonary  Pulmonary exam normal     Other Findings        Anesthesia Plan  ASA Score- 2     Anesthesia Type- IV sedation with anesthesia with ASA Monitors  Additional Monitors:   Airway Plan:           Plan Factors-Exercise tolerance (METS): >4 METS  Chart reviewed  EKG reviewed  Existing labs reviewed  Patient summary reviewed  Patient is not a current smoker  Patient not instructed to abstain from smoking on day of procedure  Patient did not smoke on day of surgery  Induction-     Postoperative Plan- Plan for postoperative opioid use  Informed Consent- Anesthetic plan and risks discussed with patient and spouse  I personally reviewed this patient with the CRNA  Discussed and agreed on the Anesthesia Plan with the CRNA               Lab Results   Component Value Date    HGBA1C 5 3 06/08/2016       Lab Results   Component Value Date     01/02/2014    K 4 6 08/11/2022     08/11/2022    CO2 30 08/11/2022    ANIONGAP 2 (L) 01/02/2014    BUN 19 08/11/2022    CREATININE 0 78 08/11/2022    GLUCOSE 76 01/02/2014    GLUF 85 08/11/2022    CALCIUM 9 5 08/11/2022    AST 26 03/30/2022    ALT 36 03/30/2022    ALKPHOS 73 03/30/2022    PROT 6 9 05/29/2014    BILITOT 0 6 05/29/2014    EGFR 74 08/11/2022       Lab Results   Component Value Date    WBC 3 52 (L) 03/30/2022    HGB 14 2 03/30/2022    HCT 44 0 03/30/2022     (H) 03/30/2022    PLT 242 03/30/2022   Sinus tachycardia t 107 bpm, nml axis, nml [place] and Qtc borderline nml at 456, no acute ischemia or arrhythmia noted

## 2022-10-28 NOTE — DISCHARGE INSTRUCTIONS
Minna Lara  Post-Operative Instructions    Pain / Swelling  There is expected to be some discomfort, swelling and bruising of the foot  You might see some blood on the bandage  This is not a cause for alarm  However, if there is active or persistent bleeding (blood running out of the bandage while at rest) - call the office at once  Apply an ice bag to right ankle for 30 minutes for each waking hour, for the first 72 hours  This should be discontinued when sleeping  This will also work through your cast if you have one  Ice must not leak and wet the dressings  Also, using the ice inappropriately can cause permanent nerve damage and frostbite  Your foot should be elevated as much as possible for the first 72 hours  The foot should be above heart level  If your foot is below heart level, throbbing and pain will increase  When sleeping, elevation can be accomplished by putting a small hard suitcase between the box spring and mattress at the foot of the bed  Walking and standing will increase pain, throbbing and bleeding  Persistent pain despite elevation and your pain meds can many times be relieved by removing the tight brown compression layer (called the ACE wrap) that is over the white gauze dressing  If you are elevating and taking your pain meds and pain is still severe, remove this brown stretchy layer but leave the gauze intact  Wait 30 minutes  If the pain subsides, reapply the ACE so it’s not so tight  If pain doesn’t get better, call your doctor  Dressings / Casts  Do not remove your surgical dressings - they will be changed at your doctor appointment  Do not allow surgical dressings to get wet  Sponge baths should be used until the sutures are removed  Do not try to keep the foot dry using a garbage bag and tape - this rarely works  If you get your dressings or cast wet - call your doctor immediately    If your cast or dressings feel tight - elevate your foot for 30 minutes  If this doesn’t help and you feel tingling or see toe discoloration - call your doctor  Do not put things in your cast such as powder, coat hangers to scratch, etc  This can cause skin damage and infections  Infection  If you have a fever at or above 100 degrees, chills, sweats, or notice red streaks rising above the dressing or smell odor / see pus (creamy white drainage), call your doctor immediately  Constipation  If you have severe constipation after surgery, this can be due to the pain medication  Notify your doctor and special medication will be prescribed to deal with this  Numbness  It is normal for your foot to be numb until about dinner time  If you’ve had a popliteal block procedure, you might be numb until the following day  When you start to feel pins and needles in the foot - this means the block is wearing off  That is the appropriate time to take your pain medication  Pain Medication  Take your post-operative pain medication as directed by your surgeon  Do not supplement your pain medication with over the counter drugs, old leftover pain medications, or extra Tylenol  You must discuss any additional medications with your doctor prior to taking them for pain  Driving  No driving is allowed without discussion with the doctor  Ambulation  Weight bear as tolerated to surgical foot  If given a flat, stiff shoe / darco wedge shoe, Do not walk at all without it  Use a device (cane, walker, crutches) to take some weight off of the foot when walking  If instructed not to put weight on the surgical foot, use the following:  Rolling Walker     Putting weight on the foot will lead to complications

## 2022-11-04 ENCOUNTER — OFFICE VISIT (OUTPATIENT)
Dept: PODIATRY | Facility: CLINIC | Age: 77
End: 2022-11-04

## 2022-11-04 VITALS
BODY MASS INDEX: 20.55 KG/M2 | HEART RATE: 68 BPM | WEIGHT: 116 LBS | SYSTOLIC BLOOD PRESSURE: 115 MMHG | DIASTOLIC BLOOD PRESSURE: 67 MMHG | HEIGHT: 63 IN

## 2022-11-04 DIAGNOSIS — M20.41 HAMMERTOE OF RIGHT FOOT: ICD-10-CM

## 2022-11-04 DIAGNOSIS — S93.104A TOE DISLOCATION, RIGHT, INITIAL ENCOUNTER: Primary | ICD-10-CM

## 2022-11-04 NOTE — PROGRESS NOTES
Assessment/Plan:      Diagnoses and all orders for this visit:    Toe dislocation, right, initial encounter    Hammertoe of right foot      Patient is stable postop 1 weeks    Incision: stable, sutures intact    Dressing instructions given to patient  Rest the foot as much as possible and elevate/ice  if swollen  Ambulatory status: WB surgical shoe    Images reviewed today: none    RTC: 1 week      Subjective:     Patient ID: Jen Riley is a 78 y o  female  DOS: 10/28/2022     Procedure: right 2nd toe amputation     Condition: Dressing C/D/I  Minimal pain  Walking on heel in surgical shoe  She feels well  The toe was amputated due to severe deformity  ,         Review of Systems      Objective:     Physical Exam  Vitals reviewed  Cardiovascular:      Pulses: Normal pulses  Musculoskeletal:         General: Tenderness and deformity (Second toe is amputated, incision stable, no sign of infection or dehiscence) present  Skin:     Capillary Refill: Capillary refill takes less than 2 seconds  Findings: No erythema or rash

## 2022-11-07 DIAGNOSIS — F51.01 PRIMARY INSOMNIA: ICD-10-CM

## 2022-11-07 RX ORDER — ZOLPIDEM TARTRATE 5 MG/1
TABLET ORAL
Qty: 15 TABLET | Refills: 0 | Status: SHIPPED | OUTPATIENT
Start: 2022-11-07 | End: 2023-08-16

## 2022-11-10 ENCOUNTER — OFFICE VISIT (OUTPATIENT)
Dept: PODIATRY | Facility: CLINIC | Age: 77
End: 2022-11-10

## 2022-11-10 VITALS
HEIGHT: 63 IN | HEART RATE: 69 BPM | WEIGHT: 116 LBS | SYSTOLIC BLOOD PRESSURE: 115 MMHG | BODY MASS INDEX: 20.55 KG/M2 | DIASTOLIC BLOOD PRESSURE: 69 MMHG

## 2022-11-10 DIAGNOSIS — S93.104A TOE DISLOCATION, RIGHT, INITIAL ENCOUNTER: Primary | ICD-10-CM

## 2022-11-10 DIAGNOSIS — N95.2 VAGINAL ATROPHY: ICD-10-CM

## 2022-11-10 DIAGNOSIS — M20.41 HAMMERTOE OF RIGHT FOOT: ICD-10-CM

## 2022-11-10 RX ORDER — ESTRADIOL 10 UG/1
1 INSERT VAGINAL 2 TIMES WEEKLY
Qty: 24 TABLET | Refills: 3 | Status: SHIPPED | OUTPATIENT
Start: 2022-11-10 | End: 2023-01-09 | Stop reason: SDUPTHER

## 2022-11-10 NOTE — PROGRESS NOTES
Assessment/Plan:      Diagnoses and all orders for this visit:    Toe dislocation, right, initial encounter    Hammertoe of right foot      Patient is stable postop 2 weeks    Incision: healed sutures removed    Dressing instructions given to patient  Rest the foot as much as possible and elevate/ice  if swollen  Ambulatory status: advance as tolerated    Images reviewed today: none    RTC: as needed  Subjective:     Patient ID: Jeane Hinkle is a 78 y o  female  DOS: 11/28/2022     Procedure: right 2nd toe amputation     Condition: Dressing C/D/I  No pain, no drainage  Review of Systems      Objective:     Physical Exam  Vitals reviewed  Cardiovascular:      Pulses: Normal pulses  Musculoskeletal:         General: Deformity (right 2nd toe amputation is healed, no pain with WB) present  Neurological:      Mental Status: She is alert

## 2022-11-23 DIAGNOSIS — I10 PRIMARY HYPERTENSION: Primary | ICD-10-CM

## 2022-11-23 RX ORDER — METOPROLOL SUCCINATE 25 MG/1
25 TABLET, EXTENDED RELEASE ORAL DAILY
Qty: 30 TABLET | Refills: 2 | Status: SHIPPED | OUTPATIENT
Start: 2022-11-23 | End: 2022-12-15

## 2022-12-15 DIAGNOSIS — I10 PRIMARY HYPERTENSION: ICD-10-CM

## 2022-12-15 RX ORDER — METOPROLOL SUCCINATE 25 MG/1
25 TABLET, EXTENDED RELEASE ORAL DAILY
Qty: 90 TABLET | Refills: 1 | Status: SHIPPED | OUTPATIENT
Start: 2022-12-15

## 2023-01-09 DIAGNOSIS — N95.2 VAGINAL ATROPHY: ICD-10-CM

## 2023-01-12 RX ORDER — ESTRADIOL 10 UG/1
1 INSERT VAGINAL 2 TIMES WEEKLY
Qty: 24 TABLET | Refills: 0 | Status: SHIPPED | OUTPATIENT
Start: 2023-01-12

## 2023-01-30 ENCOUNTER — OFFICE VISIT (OUTPATIENT)
Dept: URGENT CARE | Facility: CLINIC | Age: 78
End: 2023-01-30

## 2023-01-30 VITALS
WEIGHT: 120 LBS | TEMPERATURE: 98.5 F | HEIGHT: 63 IN | DIASTOLIC BLOOD PRESSURE: 74 MMHG | HEART RATE: 52 BPM | BODY MASS INDEX: 21.26 KG/M2 | OXYGEN SATURATION: 100 % | RESPIRATION RATE: 16 BRPM | SYSTOLIC BLOOD PRESSURE: 122 MMHG

## 2023-01-30 DIAGNOSIS — S61.215A LACERATION OF LEFT RING FINGER WITHOUT FOREIGN BODY WITHOUT DAMAGE TO NAIL, INITIAL ENCOUNTER: Primary | ICD-10-CM

## 2023-01-30 NOTE — PATIENT INSTRUCTIONS
Patient was educated on left ring finger laceration  Patient was educated on keeping laceration clean and dry  Patient was educated on signs of infection  These include- redness, purulent discharge and high grade fever  Patient was educated to have sutures removed in 7-10 days  Tetanus was updated  Laceration   WHAT YOU NEED TO KNOW:   A laceration is an injury to the skin and the soft tissue underneath it  Lacerations can happen anywhere on the body  DISCHARGE INSTRUCTIONS:   Return to the emergency department if:   You have heavy bleeding or bleeding that does not stop after 10 minutes of holding firm, direct pressure over the wound  Your wound opens up  Call your doctor if:   You have a fever or chills  Your laceration is red, warm, or swollen  You have red streaks on your skin coming from your wound  You have white or yellow drainage from the wound that smells bad  You have pain that gets worse, even after treatment  You have questions or concerns about your condition or care  Medicines: You may need any of the following:  Prescription pain medicine  may be given  Ask your healthcare provider how to take this medicine safely  Some prescription pain medicines contain acetaminophen  Do not take other medicines that contain acetaminophen without talking to your healthcare provider  Too much acetaminophen may cause liver damage  Prescription pain medicine may cause constipation  Ask your healthcare provider how to prevent or treat constipation  Antibiotics  help treat or prevent a bacterial infection  Take your medicine as directed  Contact your healthcare provider if you think your medicine is not helping or if you have side effects  Tell him or her if you are allergic to any medicine  Keep a list of the medicines, vitamins, and herbs you take  Include the amounts, and when and why you take them  Bring the list or the pill bottles to follow-up visits   Carry your medicine list with you in case of an emergency  Care for your wound as directed:   Do not get your wound wet  until your healthcare provider says it is okay  Do not soak your wound in water  Do not go swimming until your healthcare provider says it is okay  Carefully wash the wound with soap and water  Gently pat the area dry or allow it to air dry  Change your bandages  when they get wet, dirty, or after washing  Apply new, clean bandages as directed  Do not apply elastic bandages or tape too tight  Do not put powders or lotions over your incision  Apply antibiotic ointment as directed  Your healthcare provider may give you antibiotic ointment to put over your wound if you have stitches  If you have strips of tape over your incision, let them dry up and fall off on their own  If they do not fall off within 14 days, gently remove them  If you have glue over your wound, do not remove or pick at it  If your glue comes off, do not replace it with glue that you have at home  Check your wound every day for signs of infection, such as swelling, redness, or pus  Self-care:   Apply ice  on your wound for 15 to 20 minutes every hour or as directed  Use an ice pack, or put crushed ice in a plastic bag  Cover it with a towel  Ice helps prevent tissue damage and decreases swelling and pain  Use a splint as directed  A splint will decrease movement and stress on your wound  It may help it heal faster  A splint may be used for lacerations over joints or areas of your body that bend  Ask your healthcare provider how to apply and remove a splint  Decrease scarring of your wound  by applying ointments as directed  Do not apply ointments until your healthcare provider says it is okay  You may need to wait until your wound is healed  Ask which ointment to buy and how often to use it  After your wound is healed, use sunscreen over the area when you are out in the sun   You should do this for at least 6 months to 1 year after your injury  Follow up with your doctor as directed: You may need to follow up in 24 to 48 hours to have your wound checked for infection  You will need to return in 3 to 14 days if you have stitches or staples so they can be removed  Care for your wound as directed to prevent infection and help it heal  Write down your questions so you remember to ask them during your visits  © Copyright [employer] 2022 Information is for End User's use only and may not be sold, redistributed or otherwise used for commercial purposes  All illustrations and images included in CareNotes® are the copyrighted property of A D A M , Inc  or [facility] Joyce Weathers   The above information is an  only  It is not intended as medical advice for individual conditions or treatments  Talk to your doctor, nurse or pharmacist before following any medical regimen to see if it is safe and effective for you

## 2023-01-30 NOTE — PROGRESS NOTES
[facility]        NAME: Charly Kohler is a 70 y o  female  : 1945    MRN: [id number]  DATE: 2023  TIME: 11:22 AM    Assessment and Plan   Laceration of left ring finger without foreign body without damage to nail, initial encounter [S61 215A]  1  Laceration of left ring finger without foreign body without damage to nail, initial encounter  Tdap Vaccine greater than or equal to 8yo    Laceration repair        Laceration repair    Date/Time: 2023 11:09 AM  Performed by: Chinmay Vargas PA-C  Authorized by: Chinmay Vargas PA-C   Consent: Verbal consent obtained  Risks and benefits: risks, benefits and alternatives were discussed  Consent given by: patient  Patient identity confirmed: verbally with patient  Body area: upper extremity  Location details: left ring finger  Wound length (cm): 1 cm by 1 cm  Tendon involvement: none  Anesthesia: local infiltration    Anesthesia:  Local Anesthetic: lidocaine 1% without epinephrine  Anesthetic total (ml): 1ml  Sedation:  Patient sedated: no        Procedure Details:  Irrigation solution: saline  Skin closure: 3-0 nylon  Number of sutures: 3  Technique: simple  Approximation: close  Dressing: non adherant padding  Patient tolerance: patient tolerated the procedure well with no immediate complications          Patient Instructions     Patient was educated on left ring finger laceration  Patient was educated on keeping laceration clean and dry  Patient was educated on signs of infection  These include- redness, purulent discharge and high grade fever  Patient was educated to have sutures removed in 7-10 days  Tetanus was updated  Chief Complaint     Chief Complaint   Patient presents with   • Laceration     Pt reports left hand fourth digit laceration resulting from a knife while cutting carrots this morning  Managing with pressure dressing            History of Present Illness       Patient is here today reporting she was cutting carrots and her left ring finger was cut  Patient reports she does not know when her last Tetanus was  Denies any allergies to medications  Review of Systems   Review of Systems   Constitutional: Negative  Respiratory: Negative  Cardiovascular: Negative  Skin:        1 cm by 1 cm laceration on left ring finger   Psychiatric/Behavioral: Negative  Current Medications       Current Outpatient Medications:   •  betamethasone, augmented, (DIPROLENE) 0 05 % ointment, APPLY TO RASH ON UPPER/ LOWER EXTREMITIES TWICE DAILY X10-14 DAYS (DO NOT USE ON FACE OR BODY FOLDS), Disp: , Rfl: 1  •  estradiol (Yuvafem) 10 MCG TABS vaginal tablet, Insert 1 tablet (10 mcg total) into the vagina 2 (two) times a week At bedtime, Disp: 24 tablet, Rfl: 0  •  latanoprost (XALATAN) 0 005 % ophthalmic solution, INSTILL 1 DROP INTO BOTH EYES AT BEDTIME, Disp: , Rfl:   •  metoprolol succinate (TOPROL-XL) 25 mg 24 hr tablet, TAKE 1 TABLET (25 MG TOTAL) BY MOUTH DAILY  , Disp: 90 tablet, Rfl: 1  •  zolpidem (AMBIEN) 5 mg tablet, Take   5-1 tab as needed for sleep, Disp: 15 tablet, Rfl: 0    Current Allergies     Allergies as of 01/30/2023   • (No Known Allergies)            The following portions of the patient's history were reviewed and updated as appropriate: allergies, current medications, past family history, past medical history, past social history, past surgical history and problem list      Past Medical History:   Diagnosis Date   • Breast asymmetry     Resolved: Nov 9, 2017   • 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[facility])    • Transaminitis     Last assessed: Mike 3, 2014   • Varicose veins with pain, unspecified laterality     Resolved: June 6, 2017   • Vasovagal syncope     last assessed: Dec 30, 2013       Past Surgical History:   Procedure Laterality Date   • BREAST BIOPSY      unsure laterality or date of biopsy   • CATARACT EXTRACTION, BILATERAL Bilateral 2018 january and april   • COLONOSCOPY Complete - [place] 2/13/13 - Hemorrhoids, repeat 5 years d/t family h/o colon cancer    • FOOT SURGERY      mortons neuroma   • MOUTH SURGERY      Tooth Extraction - last assessed: Aug 19, 2014   • MOUTH SURGERY N/A 11/2017   • [place] AMPUTATION TOE METATARSOPHALANGEAL JOINT Right 10/28/2022    Procedure: AMPUTATION TOE Right Second;  Surgeon: Krissy Parish DPM;  Location: [address] [place] [address] OR;  Service: Podiatry   • SKIN BIOPSY Right 07/2018    right thigh   • TONSILLECTOMY      last assessed: Aug 19, 2014       Family History   Problem Relation Age of Onset   • Colon cancer Mother         diagnosed in her 45s and 66's   • Endometrial cancer Mother         63's   • Lung cancer Father 80   • Colon cancer Brother 64   • No Known Problems Sister    • No Known Problems Maternal Grandmother    • No Known Problems Maternal Grandfather    • No Known Problems Paternal Grandmother    • No Known Problems Paternal Grandfather    • Colon cancer Maternal Aunt         unknown age of onset   • No Known Problems Maternal Aunt    • No Known Problems Paternal Aunt    • No Known Problems Paternal Aunt    • No Known Problems Paternal Aunt    • No Known Problems Paternal Aunt    • No Known Problems Paternal Aunt          Medications have been verified  Objective   /74   Pulse (!) 52   Temp 98 5 °F (36 9 °C)   Resp 16   Ht 5' 3" (1 6 m)   Wt 54 4 kg (120 lb)   LMP  (LMP Unknown)   SpO2 100%   BMI 21 26 kg/m²   No LMP recorded (lmp unknown)  Patient is postmenopausal        Physical Exam     Physical Exam  Vitals and nursing note reviewed  Constitutional:       Appearance: Normal appearance  HENT:      Head: Normocephalic  Cardiovascular:      Rate and Rhythm: Normal rate and regular rhythm  Heart sounds: Normal heart sounds  Pulmonary:      Breath sounds: Normal breath sounds  Musculoskeletal:      Comments: 1 cm by 1 cm laceration on dorsal aspect of left ring finger   Left ring finger flexion and extension intact against resistance  Sensation intact in left ring finger  Neurological:      General: No focal deficit present  Mental Status: She is alert and oriented to person, place, and time     Psychiatric:         Mood and Affect: Mood normal          Behavior: Behavior normal

## 2023-02-03 ENCOUNTER — HOSPITAL ENCOUNTER (OUTPATIENT)
Dept: MAMMOGRAPHY | Facility: CLINIC | Age: 78
Discharge: HOME | End: 2023-02-03

## 2023-02-03 VITALS — BODY MASS INDEX: 21.09 KG/M2 | WEIGHT: 119.05 LBS | HEIGHT: 63 IN

## 2023-02-03 DIAGNOSIS — Z12.31 VISIT FOR SCREENING MAMMOGRAM: ICD-10-CM

## 2023-02-03 DIAGNOSIS — Z12.31 ENCOUNTER FOR SCREENING MAMMOGRAM FOR BREAST CANCER: ICD-10-CM

## 2023-02-11 ENCOUNTER — OFFICE VISIT (OUTPATIENT)
Dept: URGENT CARE | Facility: CLINIC | Age: 78
End: 2023-02-11

## 2023-02-11 VITALS
TEMPERATURE: 98 F | SYSTOLIC BLOOD PRESSURE: 118 MMHG | OXYGEN SATURATION: 98 % | RESPIRATION RATE: 16 BRPM | DIASTOLIC BLOOD PRESSURE: 76 MMHG | HEART RATE: 74 BPM

## 2023-02-11 DIAGNOSIS — S61.215A LACERATION OF LEFT RING FINGER WITHOUT FOREIGN BODY WITHOUT DAMAGE TO NAIL, INITIAL ENCOUNTER: Primary | ICD-10-CM

## 2023-02-11 NOTE — PROGRESS NOTES
[facility]        NAME: Lissette Heard is a 70 y o  female  : 1945    MRN: [id number]  DATE: 2023  TIME: 3:17 PM    Assessment and Plan   Laceration of left ring finger without foreign body without damage to nail, initial encounter [S61 215A]  1  Laceration of left ring finger without foreign body without damage to nail, initial encounter              Patient Instructions       Follow up with PCP in 3-5 days  Proceed to  ER if symptoms worsen  Chief Complaint     Chief Complaint   Patient presents with   • Hand Pain     Pt had sutures removed from left ring finger two days ago  She reports the finger still hurts  History of Present Illness       29-year-old female presenting for wound check of her left ring finger  She states that 2 days ago her sutures were removed and does note some ongoing discomfort at the tip of her finger  Review of Systems   Review of Systems   Constitutional: Negative  HENT: Negative  Eyes: Negative  Respiratory: Negative  Cardiovascular: Negative  Gastrointestinal: Negative  Endocrine: Negative  Genitourinary: Negative  Musculoskeletal: Negative  Skin: Positive for wound  Allergic/Immunologic: Negative  Neurological: Negative  Hematological: Negative  Psychiatric/Behavioral: Negative  Current Medications       Current Outpatient Medications:   •  betamethasone, augmented, (DIPROLENE) 0 05 % ointment, APPLY TO RASH ON UPPER/ LOWER EXTREMITIES TWICE DAILY X10-14 DAYS (DO NOT USE ON FACE OR BODY FOLDS), Disp: , Rfl: 1  •  estradiol (Yuvafem) 10 MCG TABS vaginal tablet, Insert 1 tablet (10 mcg total) into the vagina 2 (two) times a week At bedtime, Disp: 24 tablet, Rfl: 0  •  latanoprost (XALATAN) 0 005 % ophthalmic solution, INSTILL 1 DROP INTO BOTH EYES AT BEDTIME, Disp: , Rfl:   •  metoprolol succinate (TOPROL-XL) 25 mg 24 hr tablet, TAKE 1 TABLET (25 MG TOTAL) BY MOUTH DAILY  , Disp: 90 tablet, Rfl: 1  •  zolpidem (AMBIEN) 5 mg tablet, Take   5-1 tab as needed for sleep, Disp: 15 tablet, Rfl: 0    Current Allergies     Allergies as of 02/11/2023   • (No Known Allergies)            The following portions of the patient's history were reviewed and updated as appropriate: allergies, current medications, past family history, past medical history, past social history, past surgical history and problem list      Past Medical History:   Diagnosis Date   • Breast asymmetry     Resolved: Nov 9, 2017   • 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[facility])    • Transaminitis     Last assessed: Mike 3, 2014   • Varicose veins with pain, unspecified laterality     Resolved: June 6, 2017   • Vasovagal syncope     last assessed: Dec 30, 2013       Past Surgical History:   Procedure Laterality Date   • BREAST BIOPSY      unsure laterality or date of biopsy   • CATARACT EXTRACTION, BILATERAL Bilateral 2018 january and april   • COLONOSCOPY      Complete - [place] 2/13/13 - Hemorrhoids, repeat 5 years d/t family h/o colon cancer    • FOOT SURGERY      mortons neuroma   • MOUTH SURGERY      Tooth Extraction - last assessed: Aug 19, 2014   • MOUTH SURGERY N/A 11/2017   • [place] AMPUTATION TOE METATARSOPHALANGEAL JOINT Right 10/28/2022    Procedure: AMPUTATION TOE Right Second;  Surgeon: Leticia Trujillo DPM;  Location: [address] [place] [address];  Service: Podiatry   • SKIN BIOPSY Right 07/2018    right thigh   • TONSILLECTOMY      last assessed: Aug 19, 2014       Family History   Problem Relation Age of Onset   • Colon cancer Mother         diagnosed in her 45s and 66's   • Endometrial cancer Mother         63's   • Lung cancer Father 80   • Colon cancer Brother 64   • No Known Problems Sister    • No Known Problems Maternal Grandmother    • No Known Problems Maternal Grandfather    • No Known Problems Paternal Grandmother    • No Known Problems Paternal Grandfather    • Colon cancer Maternal Aunt         unknown age of onset   • No Known Problems Maternal Aunt • No Known Problems Paternal Aunt    • No Known Problems Paternal Aunt    • No Known Problems Paternal Aunt    • No Known Problems Paternal Aunt    • No Known Problems Paternal Aunt          Medications have been verified  Objective   /76   Pulse 74   Temp 98 °F (36 7 °C)   Resp 16   LMP  (LMP Unknown)   SpO2 98%   No LMP recorded (lmp unknown)  Patient is postmenopausal        Physical Exam     Physical Exam  Vitals and nursing note reviewed  Constitutional:       Appearance: She is well-developed  HENT:      Head: Normocephalic  Eyes:      Pupils: Pupils are equal, round, and reactive to light  Pulmonary:      Effort: Pulmonary effort is normal    Musculoskeletal:         General: Normal range of motion  Skin:     General: Skin is warm and dry  Findings: Abrasion present  Neurological:      Mental Status: She is alert and oriented to person, place, and time

## 2023-02-13 ENCOUNTER — ANNUAL EXAM (OUTPATIENT)
Dept: GYNECOLOGY | Facility: CLINIC | Age: 78
End: 2023-02-13

## 2023-02-13 VITALS
WEIGHT: 122.4 LBS | BODY MASS INDEX: 21.69 KG/M2 | HEIGHT: 63 IN | SYSTOLIC BLOOD PRESSURE: 120 MMHG | DIASTOLIC BLOOD PRESSURE: 68 MMHG

## 2023-02-13 DIAGNOSIS — N95.2 VAGINAL ATROPHY: ICD-10-CM

## 2023-02-13 DIAGNOSIS — M85.852 OSTEOPENIA OF NECK OF LEFT FEMUR: Primary | ICD-10-CM

## 2023-02-13 DIAGNOSIS — Z12.31 ENCOUNTER FOR SCREENING MAMMOGRAM FOR BREAST CANCER: ICD-10-CM

## 2023-02-13 DIAGNOSIS — Z01.419 ENCOUNTER FOR ANNUAL ROUTINE GYNECOLOGICAL EXAMINATION: ICD-10-CM

## 2023-02-13 RX ORDER — ESTRADIOL 10 UG/1
1 INSERT VAGINAL 2 TIMES WEEKLY
Qty: 24 TABLET | Refills: 3 | Status: SHIPPED | OUTPATIENT
Start: 2023-02-13

## 2023-02-13 NOTE — PROGRESS NOTES
Assessment/Plan:    She does not require Pap    mammogram reviewed with her including breast density  RX given for next year  Discussed self breast exams    colon cancer screening-she has an appointment in March to set up her next colonoscopy which is due in May    Osteopenia-she exercises regularly  She is due next February and this was ordered so that she can schedule with her mammogram    Vaginal atrophy-prescription given for generic Vagifem    discussed preventive care, regular exercise and a healthy diet      No problem-specific Assessment & Plan notes found for this encounter  Diagnoses and all orders for this visit:    Osteopenia of neck of left femur  -     DXA bone density spine hip and pelvis; Future    Encounter for annual routine gynecological examination    Vaginal atrophy  -     estradiol (Yuvafem) 10 MCG TABS vaginal tablet; Insert 1 tablet (10 mcg total) into the vagina 2 (two) times a week At bedtime    Encounter for screening mammogram for breast cancer  -     Mammo screening bilateral w 3d & cad; Future          Subjective:      Patient ID: Xavier Wilkes is a 70 y o  female  Patient here for yearly  She has no GYN complaints  She has been busy taking care of her mother who is 80-4/1  She uses generic Vagifem and this is working well for her    She had a normal 3D mammogram earlier this month with average risk and scattered fibroglandular densities  DEXA in February 2022 showed mild osteopenia in her femoral neck  She did not meet treatment criteria        The following portions of the patient's history were reviewed and updated as appropriate: allergies, current medications, past family history, past medical history, past social history, past surgical history and problem list     Review of Systems   Constitutional: Negative  Gastrointestinal: Negative  Genitourinary: Negative            Objective:      /68 (BP Location: Left arm, Patient Position: Sitting, Cuff Size: Standard)   Ht 5' 3" (1 6 m)   Wt 55 5 kg (122 lb 6 4 oz)   LMP  (LMP Unknown)   BMI 21 68 kg/m²          Physical Exam  Vitals reviewed  Constitutional:       Appearance: She is well-developed  Neck:      Thyroid: No thyromegaly  Trachea: No tracheal deviation  Cardiovascular:      Rate and Rhythm: Normal rate and regular rhythm  Pulmonary:      Effort: Pulmonary effort is normal       Breath sounds: Normal breath sounds  Chest:   Breasts:     Breasts are symmetrical       Right: No inverted nipple, mass, nipple discharge, skin change or tenderness  Left: No inverted nipple, mass, nipple discharge, skin change or tenderness  Abdominal:      General: There is no distension  Palpations: Abdomen is soft  There is no mass  Tenderness: There is no abdominal tenderness  Genitourinary:     Labia:         Right: No rash, tenderness, lesion or injury  Left: No rash, tenderness, lesion or injury  Vagina: Normal       Cervix: No cervical motion tenderness, discharge or friability  Adnexa:         Right: No mass, tenderness or fullness  Left: No mass, tenderness or fullness          Rectum: Normal

## 2023-02-23 ENCOUNTER — OFFICE VISIT (OUTPATIENT)
Dept: FAMILY MEDICINE CLINIC | Facility: HOSPITAL | Age: 78
End: 2023-02-23

## 2023-02-23 VITALS
HEART RATE: 86 BPM | TEMPERATURE: 97.8 F | BODY MASS INDEX: 21.83 KG/M2 | DIASTOLIC BLOOD PRESSURE: 70 MMHG | HEIGHT: 63 IN | SYSTOLIC BLOOD PRESSURE: 106 MMHG | WEIGHT: 123.2 LBS

## 2023-02-23 DIAGNOSIS — Z00.00 MEDICARE ANNUAL WELLNESS VISIT, SUBSEQUENT: ICD-10-CM

## 2023-02-23 DIAGNOSIS — I10 PRIMARY HYPERTENSION: Primary | ICD-10-CM

## 2023-02-23 DIAGNOSIS — H91.90 HEARING LOSS, UNSPECIFIED HEARING LOSS TYPE, UNSPECIFIED LATERALITY: ICD-10-CM

## 2023-02-23 DIAGNOSIS — G47.62 NOCTURNAL LEG CRAMPS: ICD-10-CM

## 2023-02-23 DIAGNOSIS — N39.41 URINARY INCONTINENCE, URGE: ICD-10-CM

## 2023-02-23 DIAGNOSIS — R09.89 CHRONIC THROAT CLEARING: ICD-10-CM

## 2023-02-23 DIAGNOSIS — G47.00 INSOMNIA, UNSPECIFIED TYPE: ICD-10-CM

## 2023-02-23 PROBLEM — F41.9 ANXIETY: Status: RESOLVED | Noted: 2022-10-28 | Resolved: 2023-02-23

## 2023-02-23 NOTE — ASSESSMENT & PLAN NOTE
Using rare Ambien prn qhs w/benefit, call with new/worse symptoms, PDMP Rx website reviewed and no red flag use noted

## 2023-02-23 NOTE — PROGRESS NOTES
Assessment and Plan:     Problem List Items Addressed This Visit        Cardiovascular and Mediastinum    Primary hypertension - Primary     Bp controlled, con't current Metoprolol, will follow and re-eval in 6 mos, con't checking BP at home and call if consistently > 140/90         Relevant Orders    CBC    Comprehensive metabolic panel    Lipid panel    TSH, 3rd generation with Free T4 reflex       Other    Insomnia     Using rare Ambien prn qhs w/benefit, call with new/worse symptoms, PDMP Rx website reviewed and no red flag use noted         Relevant Orders    CBC    Comprehensive metabolic panel    Lipid panel    TSH, 3rd generation with Free T4 reflex   Other Visit Diagnoses     Nocturnal leg cramps        Reassured likely multifactoral with dehydration/poss electrolyte abnormalities, encouraged stretching and keep hydrated, check electrolytes with labs in April - BW order given, call with persistent/new/worse symptoms in the interm     Relevant Orders    CBC    Comprehensive metabolic panel    Lipid panel    TSH, 3rd generation with Free T4 reflex    Chronic throat clearing        Advised OTC Zyrtec OR Claritin and Flonase 2 sprays each  nostril once daily, if not better would recommend Omeprazole 20 mg 1 tab PO q am, call with persistent/new/worse symptoms    Relevant Orders    CBC    Comprehensive metabolic panel    Lipid panel    TSH, 3rd generation with Free T4 reflex    Medicare annual wellness visit, subsequent        Hearing loss, unspecified hearing loss type, unspecified laterality        referral to ENT placed    Relevant Orders    Ambulatory Referral to Otolaryngology    CBC    Comprehensive metabolic panel    Lipid panel    TSH, 3rd generation with Free T4 reflex    Urinary incontinence, urge      Lifestyle and dietary changes to improve symptoms reviewed          Depression Screening and Follow-up Plan: Patient was screened for depression during today's encounter   They screened negative with a PHQ-2 score of 0  Urinary Incontinence Plan of Care: counseling topics discussed: practice Kegel (pelvic floor strengthening) exercises, use restroom every 2 hours, limit alcohol, caffeine, spicy foods, and acidic foods and limiting fluid intake 3-4 hours before bed  Preventive health issues were discussed with patient, and age appropriate screening tests were ordered as noted in patient's After Visit Summary  Colonoscopy  - 7 yrs    Mammo      Dexa  - osteopenia    BW 3/22  FLP     Mom just  2 days ago      Personalized health advice and appropriate referrals for health education or preventive services given if needed, as noted in patient's After Visit Summary  History of Present Illness:     Patient presents for a Medicare Wellness Visit    HPI Pt here for f/u appt and AWV    Pt had her amputation of R 2nd toe in Oct 22 as scheduled  She is happy she had the amputation and has no pain and is more comfortable wearing shoes  She is following with podiatry prn  BP at goal today and meds were reviewed and no changes have occurred  She denies missing doses of meds or SE with the meds  She does check her BP outside the office and readings were brought in and reviewed and she is ranging from 107/76 to 131/79 with an average of upper 110's to 120's/70's  She notes no frequent Ha's/dizziness/double vision/CP  Pt con't to use her Ambien qhs prn for insomnia w/benefit  She notes no SE with the rx and uses approx 1/3 of a tab when she takes it  Her mom passed 2 days ago on hospice  She is in the process of planning  and selling her house  She has had some nocturnal leg cramps B/L a few times a week  She has had no new activity  Her sister has noted she clears her throat a lot  She staes in the past she was told it was PND  She notes frequently clearing her throat but she is not bothered by it  She notes no PND/phlegm and denies reflux/heart burn    Symptoms worse in the am - saad after drinking milk  She notes no issues swallowing and denies food or pills getting stuck          Patient Care Team:  Roxana Hare DO as PCP - General  DO Glen Rao DO (Dermatology)     Review of Systems:     Review of Systems     Problem List:     Patient Active Problem List   Diagnosis   • Atrophy of vagina   • Cataracta   • Osteopenia   • Macrocytosis   • Insomnia   • Squamous cell skin cancer   • Primary hypertension      Past Medical and Surgical History:     Past Medical History:   Diagnosis Date   • Breast asymmetry     Resolved: Nov 9, 2017   • 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[facility])    • Transaminitis     Last assessed: Mike 3, 2014   • Varicose veins with pain, unspecified laterality     Resolved: June 6, 2017   • Vasovagal syncope     last assessed: Dec 30, 2013     Past Surgical History:   Procedure Laterality Date   • BREAST BIOPSY      unsure laterality or date of biopsy   • CATARACT EXTRACTION, BILATERAL Bilateral 2018 january and april   • COLONOSCOPY      Complete - [facility] 2/13/13 - Hemorrhoids, repeat 5 years d/t family h/o colon cancer    • FOOT SURGERY      mortons neuroma   • MOUTH SURGERY      Tooth Extraction - last assessed: Aug 19, 2014   • MOUTH SURGERY N/A 11/2017   • NV AMPUTATION TOE METATARSOPHALANGEAL JOINT Right 10/28/2022    Procedure: AMPUTATION TOE Right Second;  Surgeon: Maylin Black DPM;  Location: [address] [place] [address];  Service: Podiatry   • SKIN BIOPSY Right 07/2018    right thigh   • TONSILLECTOMY      last assessed: Aug 19, 2014      Family History:     Family History   Problem Relation Age of Onset   • Colon cancer Mother         diagnosed in her 45s and 66's   • Endometrial cancer Mother         63's   • Lung cancer Father 80   • Colon cancer Brother 64   • No Known Problems Sister    • No Known Problems Maternal Grandmother    • No Known Problems Maternal Grandfather    • No Known Problems Paternal Grandmother    • No Known Problems Paternal Grandfather    • Colon cancer Maternal Aunt         unknown age of onset   • No Known Problems Maternal Aunt    • No Known Problems Paternal Aunt    • No Known Problems Paternal Aunt    • No Known Problems Paternal Aunt    • No Known Problems Paternal Aunt    • No Known Problems Paternal Aunt       Social History:     Social History     Socioeconomic History   • Marital status: /Civil Union     Spouse name: None   • Number of children: 2   • Years of education: None   • Highest education level: None   Occupational History   • None   Tobacco Use   • Smoking status: Never   • Smokeless tobacco: Never   Vaping Use   • Vaping Use: Never used   Substance and Sexual Activity   • Alcohol use: Yes     Alcohol/week: 2 0 - 4 0 standard drinks     Types: 2 - 4 Glasses of wine per week     Comment: 1 per day - social/ drinks wine    • Drug use: No   • Sexual activity: Yes     Partners: Male   Other Topics Concern   • None   Social History Narrative    Always uses seat belt     Caffeine use     [facility]      Social Determinants of Health     Financial Resource Strain: Low Risk    • Difficulty of Paying Living Expenses: Not hard at all   Food Insecurity: Not on file   Transportation Needs: No Transportation Needs   • Lack of Transportation (Medical): No   • Lack of Transportation (Non-Medical):  No   Physical Activity: Not on file   Stress: Not on file   Social Connections: Not on file   Intimate Partner Violence: Not on file   Housing Stability: Not on file      Medications and Allergies:     Current Outpatient Medications   Medication Sig Dispense Refill   • betamethasone, augmented, (DIPROLENE) 0 05 % ointment APPLY TO RASH ON UPPER/ LOWER EXTREMITIES TWICE DAILY X10-14 DAYS (DO NOT USE ON FACE OR BODY FOLDS)  1   • estradiol (Yuvafem) 10 MCG TABS vaginal tablet Insert 1 tablet (10 mcg total) into the vagina 2 (two) times a week At bedtime 24 tablet 3   • latanoprost (XALATAN) 0 005 % ophthalmic solution INSTILL 1 DROP INTO BOTH EYES AT BEDTIME     • metoprolol succinate (TOPROL-XL) 25 mg 24 hr tablet TAKE 1 TABLET (25 MG TOTAL) BY MOUTH DAILY  90 tablet 1   • zolpidem (AMBIEN) 5 mg tablet Take   5-1 tab as needed for sleep 15 tablet 0     No current facility-administered medications for this visit  No Known Allergies   Immunizations:     Immunization History   Administered Date(s) Administered   • COVID-19 MODERNA VACC 0 5 ML IM 02/05/2021, 03/03/2021, 06/23/2022   • COVID-19, unspecified 06/23/2022   • H1N1, All Formulations 11/16/2009   • INFLUENZA 11/17/2017, 11/12/2018, 10/01/2022   • Influenza Split High Dose Preservative Free IM 11/17/2017   • Influenza, high dose seasonal 0 7 mL 11/12/2018, 11/20/2019, 10/14/2020, 09/24/2021   • Pneumococcal Conjugate 13-Valent 12/17/2019   • Pneumococcal Polysaccharide PPV23 01/19/2021   • Tdap 01/30/2023, 01/30/2023      Health Maintenance:         Topic Date Due   • Colorectal Cancer Screening  05/31/2023 (Originally 1/3/1990)   • Breast Cancer Screening: Mammogram  02/03/2024   • DXA SCAN  02/14/2024   • Hepatitis C Screening  Completed         Topic Date Due   • COVID-19 Vaccine (4 - Booster for Al Flatness series) 08/18/2022      Medicare Screening Tests and Risk Assessments:     Vincent Smart is here for her Subsequent Wellness visit  Last Medicare Wellness visit information reviewed, patient interviewed and updates made to the record today  Health Risk Assessment:   Patient rates overall health as very good  Patient feels that their physical health rating is same  Patient is very satisfied with their life  Eyesight was rated as same  Hearing was rated as slightly worse  Patient feels that their emotional and mental health rating is same  Patients states they are never, rarely angry  Patient states they are never, rarely unusually tired/fatigued  Pain experienced in the last 7 days has been some  Patient's pain rating has been 8/10   Patient states that she has experienced no weight loss or gain in last 6 months  Hearing slight worse - intermittent but is bothersome at times    Depression Screening:   PHQ-2 Score: 0      Fall Risk Screening: In the past year, patient has experienced: no history of falling in past year      Urinary Incontinence Screening:   Patient has leaked urine accidently in the last six months  Home Safety:  Patient does not have trouble with stairs inside or outside of their home  Patient has working smoke alarms and has working carbon monoxide detector  Home safety hazards include: none  Nutrition:   Current diet is Regular  Medications:   Patient is not currently taking any over-the-counter supplements  Patient is able to manage medications  Activities of Daily Living (ADLs)/Instrumental Activities of Daily Living (IADLs):   Walk and transfer into and out of bed and chair?: Yes  Dress and groom yourself?: Yes    Bathe or shower yourself?: Yes    Feed yourself? Yes  Do your laundry/housekeeping?: Yes  Manage your money, pay your bills and track your expenses?: Yes  Make your own meals?: Yes    Do your own shopping?: Yes    Previous Hospitalizations:   Any hospitalizations or ED visits within the last 12 months?: Yes    How many hospitalizations have you had in the last year?: 1-2    Hospitalization Comments: 1 ER visit  1 hospitalization    Advance Care Planning:   Living will: Yes    Durable POA for healthcare:  Yes    Advanced directive: Yes      Cognitive Screening:   Provider or family/friend/caregiver concerned regarding cognition?: No    PREVENTIVE SCREENINGS      Cardiovascular Screening:    General: Screening Current and Risks and Benefits Discussed      Diabetes Screening:     General: Screening Current and Risks and Benefits Discussed      Colorectal Cancer Screening:     General: Risks and Benefits Discussed    Due for: Colonoscopy - High Risk      Breast Cancer Screening:     General: Screening Current and Risks and Benefits Discussed      Cervical Cancer Screening:    General: Risks and Benefits Discussed and Screening Current      Osteoporosis Screening:    General: Screening Current and Risks and Benefits Discussed      Abdominal Aortic Aneurysm (AAA) Screening:        General: Risks and Benefits Discussed and Screening Not Indicated      Lung Cancer Screening:     General: Screening Not Indicated and Risks and Benefits Discussed      Hepatitis C Screening:    General: Screening Current and Risks and Benefits Discussed    Screening, Brief Intervention, and Referral to Treatment (SBIRT)    Screening  Typical number of drinks in a day: 1  Typical number of drinks in a week: 5  Interpretation: Low risk drinking behavior  Single Item Drug Screening:  How often have you used an illegal drug (including marijuana) or a prescription medication for non-medical reasons in the past year? never    Single Item Drug Screen Score: 0  Interpretation: Negative screen for possible drug use disorder    Other Counseling Topics:   Car/seat belt/driving safety and regular weightbearing exercise  Vision Screening    Right eye Left eye Both eyes   Without correction 20/40 20/30 20/20   With correction           Physical Exam:     /70   Pulse 86   Temp 97 8 °F (36 6 °C) (Tympanic)   Ht 5' 3" (1 6 m)   Wt 55 9 kg (123 lb 3 2 oz)   LMP  (LMP Unknown)   BMI 21 82 kg/m²     Physical Exam  Vitals and nursing note reviewed  Constitutional:       General: She is not in acute distress  Appearance: She is well-developed  She is not ill-appearing  HENT:      Head: Normocephalic and atraumatic  Right Ear: Tympanic membrane and external ear normal       Left Ear: Tympanic membrane and external ear normal    Eyes:      General:         Right eye: No discharge  Left eye: No discharge  Conjunctiva/sclera: Conjunctivae normal    Neck:      Thyroid: No thyromegaly  Trachea: No tracheal deviation     Cardiovascular: Rate and Rhythm: Normal rate and regular rhythm  Heart sounds: Normal heart sounds  No murmur heard  Pulmonary:      Effort: Pulmonary effort is normal  No respiratory distress  Breath sounds: Normal breath sounds  No wheezing, rhonchi or rales  Abdominal:      General: There is no distension  Palpations: Abdomen is soft  Tenderness: There is no abdominal tenderness  There is no guarding or rebound  Musculoskeletal:         General: No deformity or signs of injury  Cervical back: Neck supple  Lymphadenopathy:      Cervical: No cervical adenopathy  Skin:     General: Skin is warm and dry  Coloration: Skin is not pale  Findings: No rash  Neurological:      General: No focal deficit present  Mental Status: She is alert  Mental status is at baseline  Motor: No abnormal muscle tone  Gait: Gait normal    Psychiatric:         Mood and Affect: Mood normal          Behavior: Behavior normal          Thought Content:  Thought content normal          Judgment: Judgment normal           Bridgette Wharton DO

## 2023-02-23 NOTE — PATIENT INSTRUCTIONS
Medicare Preventive Visit Patient Instructions  Thank you for completing your Welcome to Medicare Visit or Medicare Annual Wellness Visit today  Your next wellness visit will be due in one year (2/24/2024)  The screening/preventive services that you may require over the next 5-10 years are detailed below  Some tests may not apply to you based off risk factors and/or age  Screening tests ordered at today's visit but not completed yet may show as past due  Also, please note that scanned in results may not display below  Preventive Screenings:  Service Recommendations Previous Testing/Comments   Colorectal Cancer Screening  * Colonoscopy    * Fecal Occult Blood Test (FOBT)/Fecal Immunochemical Test (FIT)  * Fecal DNA/Cologuard Test  * Flexible Sigmoidoscopy Age: 39-70 years old   Colonoscopy: every 10 years (may be performed more frequently if at higher risk)  OR  FOBT/FIT: every 1 year  OR  Cologuard: every 3 years  OR  Sigmoidoscopy: every 5 years  Screening may be recommended earlier than age 39 if at higher risk for colorectal cancer  Also, an individualized decision between you and your healthcare provider will decide whether screening between the ages of 74-80 would be appropriate  Colonoscopy: 01/22/2018  FOBT/FIT: Not on file  Cologuard: Not on file  Sigmoidoscopy: Not on file          Breast Cancer Screening Age: 36 years old  Frequency: every 1-2 years  Not required if history of left and right mastectomy Mammogram: 02/03/2023    Screening Current   Cervical Cancer Screening Between the ages of 21-29, pap smear recommended once every 3 years  Between the ages of 33-67, can perform pap smear with HPV co-testing every 5 years     Recommendations may differ for women with a history of total hysterectomy, cervical cancer, or abnormal pap smears in past  Pap Smear: 02/13/2023    Screening Not Indicated   Hepatitis C Screening Once for adults born between 1945 and 1965  More frequently in patients at high risk for Hepatitis C Hep C Antibody: 01/07/2020    Screening Current   Diabetes Screening 1-2 times per year if you're at risk for diabetes or have pre-diabetes Fasting glucose: 85 mg/dL (8/11/2022)  A1C: No results in last 5 years (No results in last 5 years)  Screening Current   Cholesterol Screening Once every 5 years if you don't have a lipid disorder  May order more often based on risk factors  Lipid panel: 01/07/2020    Screening Current     Other Preventive Screenings Covered by Medicare:  1  Abdominal Aortic Aneurysm (AAA) Screening: covered once if your at risk  You're considered to be at risk if you have a family history of AAA  2  Lung Cancer Screening: covers low dose CT scan once per year if you meet all of the following conditions: (1) Age 50-69; (2) No signs or symptoms of lung cancer; (3) Current smoker or have quit smoking within the last 15 years; (4) You have a tobacco smoking history of at least 20 pack years (packs per day multiplied by number of years you smoked); (5) You get a written order from a healthcare provider  3  Glaucoma Screening: covered annually if you're considered high risk: (1) You have diabetes OR (2) Family history of glaucoma OR (3)  aged 48 and older OR (3)  American aged 72 and older  3  Osteoporosis Screening: covered every 2 years if you meet one of the following conditions: (1) You're estrogen deficient and at risk for osteoporosis based off medical history and other findings; (2) Have a vertebral abnormality; (3) On glucocorticoid therapy for more than 3 months; (4) Have primary hyperparathyroidism; (5) On osteoporosis medications and need to assess response to drug therapy  · Last bone density test (DXA Scan): 02/14/2022   5  HIV Screening: covered annually if you're between the age of 15-65  Also covered annually if you are younger than 13 and older than 72 with risk factors for HIV infection   For pregnant patients, it is covered up to 3 times per pregnancy  Immunizations:  Immunization Recommendations   Influenza Vaccine Annual influenza vaccination during flu season is recommended for all persons aged >= 6 months who do not have contraindications   Pneumococcal Vaccine   * Pneumococcal conjugate vaccine = PCV13 (Prevnar 13), PCV15 (Vaxneuvance), PCV20 (Prevnar 20)  * Pneumococcal polysaccharide vaccine = PPSV23 (Pneumovax) Adults 25-60 years old: 1-3 doses may be recommended based on certain risk factors  Adults 72 years old: 1-2 doses may be recommended based off what pneumonia vaccine you previously received   Hepatitis B Vaccine 3 dose series if at intermediate or high risk (ex: diabetes, end stage renal disease, liver disease)   Tetanus (Td) Vaccine - COST NOT COVERED BY MEDICARE PART B Following completion of primary series, a booster dose should be given every 10 years to maintain immunity against tetanus  Td may also be given as tetanus wound prophylaxis  Tdap Vaccine - COST NOT COVERED BY MEDICARE PART B Recommended at least once for all adults  For pregnant patients, recommended with each pregnancy  Shingles Vaccine (Shingrix) - COST NOT COVERED BY MEDICARE PART B  2 shot series recommended in those aged 48 and above     Health Maintenance Due:      Topic Date Due   • Colorectal Cancer Screening  05/31/2023 (Originally 1/3/1990)   • Breast Cancer Screening: Mammogram  02/03/2024   • DXA SCAN  02/14/2024   • Hepatitis C Screening  Completed     Immunizations Due:      Topic Date Due   • COVID-19 Vaccine (4 - Booster for Logan Means series) 08/18/2022     Advance Directives   What are advance directives? Advance directives are legal documents that state your wishes and plans for medical care  These plans are made ahead of time in case you lose your ability to make decisions for yourself  Advance directives can apply to any medical decision, such as the treatments you want, and if you want to donate organs     What are the types of advance directives? There are many types of advance directives, and each state has rules about how to use them  You may choose a combination of any of the following:  · Living will: This is a written record of the treatment you want  You can also choose which treatments you do not want, which to limit, and which to stop at a certain time  This includes surgery, medicine, IV fluid, and tube feedings  · Durable power of  for healthcare [facility]): This is a written record that states who you want to make healthcare choices for you when you are unable to make them for yourself  This person, called a proxy, is usually a family member or a friend  You may choose more than 1 proxy  · Do not resuscitate (DNR) order:  A DNR order is used in case your heart stops beating or you stop breathing  It is a request not to have certain forms of treatment, such as CPR  A DNR order may be included in other types of advance directives  · Medical directive: This covers the care that you want if you are in a coma, near death, or unable to make decisions for yourself  You can list the treatments you want for each condition  Treatment may include pain medicine, surgery, blood transfusions, dialysis, IV or tube feedings, and a ventilator (breathing machine)  · Values history: This document has questions about your views, beliefs, and how you feel and think about life  This information can help others choose the care that you would choose  Why are advance directives important? An advance directive helps you control your care  Although spoken wishes may be used, it is better to have your wishes written down  Spoken wishes can be misunderstood, or not followed  Treatments may be given even if you do not want them  An advance directive may make it easier for your family to make difficult choices about your care  Urinary Incontinence   Urinary incontinence (UI)  is when you lose control of your bladder   UI develops because your bladder cannot store or empty urine properly  The 3 most common types of UI are stress incontinence, urge incontinence, or both  Medicines:   · May be given to help strengthen your bladder control  Report any side effects of medication to your healthcare provider  Do pelvic muscle exercises often:  Your pelvic muscles help you stop urinating  Squeeze these muscles tight for 5 seconds, then relax for 5 seconds  Gradually work up to squeezing for 10 seconds  Do 3 sets of 15 repetitions a day, or as directed  This will help strengthen your pelvic muscles and improve bladder control  Train your bladder:  Go to the bathroom at set times, such as every 2 hours, even if you do not feel the urge to go  You can also try to hold your urine when you feel the urge to go  For example, hold your urine for 5 minutes when you feel the urge to go  As that becomes easier, hold your urine for 10 minutes  Self-care:   · Keep a UI record  Write down how often you leak urine and how much you leak  Make a note of what you were doing when you leaked urine  · Drink liquids as directed  You may need to limit the amount of liquid you drink to help control your urine leakage  Do not drink any liquid right before you go to bed  Limit or do not have drinks that contain caffeine or alcohol  · Prevent constipation  Eat a variety of high-fiber foods  Good examples are high-fiber cereals, beans, vegetables, and whole-grain breads  Walking is the best way to trigger your intestines to have a bowel movement  · Exercise regularly and maintain a healthy weight  Weight loss and exercise will decrease pressure on your bladder and help you control your leakage  · Use a catheter as directed  to help empty your bladder  A catheter is a tiny, plastic tube that is put into your bladder to drain your urine  · Go to behavior therapy as directed  Behavior therapy may be used to help you learn to control your urge to urinate         © Copyright [employer] 2018 Information is for [employer] use only and may not be sold, redistributed or otherwise used for commercial purposes  All illustrations and images included in CareNotes® are the copyrighted property of [employer]  or [facility] Preventive Visit Patient Instructions  Thank you for completing your Welcome to Medicare Visit or Medicare Annual Wellness Visit today  Your next wellness visit will be due in one year (2/24/2024)  The screening/preventive services that you may require over the next 5-10 years are detailed below  Some tests may not apply to you based off risk factors and/or age  Screening tests ordered at today's visit but not completed yet may show as past due  Also, please note that scanned in results may not display below  Preventive Screenings:  Service Recommendations Previous Testing/Comments   Colorectal Cancer Screening  * Colonoscopy    * Fecal Occult Blood Test (FOBT)/Fecal Immunochemical Test (FIT)  * Fecal DNA/Cologuard Test  * Flexible Sigmoidoscopy Age: 39-70 years old   Colonoscopy: every 10 years (may be performed more frequently if at higher risk)  OR  FOBT/FIT: every 1 year  OR  Cologuard: every 3 years  OR  Sigmoidoscopy: every 5 years  Screening may be recommended earlier than age 39 if at higher risk for colorectal cancer  Also, an individualized decision between you and your healthcare provider will decide whether screening between the ages of 74-80 would be appropriate  Colonoscopy: 01/22/2018  FOBT/FIT: Not on file  Cologuard: Not on file  Sigmoidoscopy: Not on file          Breast Cancer Screening Age: 36 years old  Frequency: every 1-2 years  Not required if history of left and right mastectomy Mammogram: 02/03/2023    Screening Current   Cervical Cancer Screening Between the ages of 21-29, pap smear recommended once every 3 years  Between the ages of 33-67, can perform pap smear with HPV co-testing every 5 years  Recommendations may differ for women with a history of total hysterectomy, cervical cancer, or abnormal pap smears in past  Pap Smear: 02/13/2023    Screening Not Indicated   Hepatitis C Screening Once for adults born between 1945 and 1965  More frequently in patients at high risk for Hepatitis C Hep C Antibody: 01/07/2020    Screening Current   Diabetes Screening 1-2 times per year if you're at risk for diabetes or have pre-diabetes Fasting glucose: 85 mg/dL (8/11/2022)  A1C: No results in last 5 years (No results in last 5 years)  Screening Current   Cholesterol Screening Once every 5 years if you don't have a lipid disorder  May order more often based on risk factors  Lipid panel: 01/07/2020    Screening Current     Other Preventive Screenings Covered by Medicare:  6  Abdominal Aortic Aneurysm (AAA) Screening: covered once if your at risk  You're considered to be at risk if you have a family history of AAA  7  Lung Cancer Screening: covers low dose CT scan once per year if you meet all of the following conditions: (1) Age 50-69; (2) No signs or symptoms of lung cancer; (3) Current smoker or have quit smoking within the last 15 years; (4) You have a tobacco smoking history of at least 20 pack years (packs per day multiplied by number of years you smoked); (5) You get a written order from a healthcare provider  8  Glaucoma Screening: covered annually if you're considered high risk: (1) You have diabetes OR (2) Family history of glaucoma OR (3)  aged 48 and older OR (3)  American aged 72 and older  5   Osteoporosis Screening: covered every 2 years if you meet one of the following conditions: (1) You're estrogen deficient and at risk for osteoporosis based off medical history and other findings; (2) Have a vertebral abnormality; (3) On glucocorticoid therapy for more than 3 months; (4) Have primary hyperparathyroidism; (5) On osteoporosis medications and need to assess response to drug therapy  · Last bone density test (DXA Scan): 02/14/2022   10  HIV Screening: covered annually if you're between the age of 15-65  Also covered annually if you are younger than 13 and older than 72 with risk factors for HIV infection  For pregnant patients, it is covered up to 3 times per pregnancy  Immunizations:  Immunization Recommendations   Influenza Vaccine Annual influenza vaccination during flu season is recommended for all persons aged >= 6 months who do not have contraindications   Pneumococcal Vaccine   * Pneumococcal conjugate vaccine = PCV13 (Prevnar 13), PCV15 (Vaxneuvance), PCV20 (Prevnar 20)  * Pneumococcal polysaccharide vaccine = PPSV23 (Pneumovax) Adults 25-60 years old: 1-3 doses may be recommended based on certain risk factors  Adults 72 years old: 1-2 doses may be recommended based off what pneumonia vaccine you previously received   Hepatitis B Vaccine 3 dose series if at intermediate or high risk (ex: diabetes, end stage renal disease, liver disease)   Tetanus (Td) Vaccine - COST NOT COVERED BY MEDICARE PART B Following completion of primary series, a booster dose should be given every 10 years to maintain immunity against tetanus  Td may also be given as tetanus wound prophylaxis  Tdap Vaccine - COST NOT COVERED BY MEDICARE PART B Recommended at least once for all adults  For pregnant patients, recommended with each pregnancy  Shingles Vaccine (Shingrix) - COST NOT COVERED BY MEDICARE PART B  2 shot series recommended in those aged 48 and above     Health Maintenance Due:      Topic Date Due   • Colorectal Cancer Screening  05/31/2023 (Originally 1/3/1990)   • Breast Cancer Screening: Mammogram  02/03/2024   • DXA SCAN  02/14/2024   • Hepatitis C Screening  Completed     Immunizations Due:      Topic Date Due   • COVID-19 Vaccine (4 - Booster for Abisai Naegeli series) 08/18/2022     Advance Directives   What are advance directives?   Advance directives are legal documents that state your wishes and plans for medical care  These plans are made ahead of time in case you lose your ability to make decisions for yourself  Advance directives can apply to any medical decision, such as the treatments you want, and if you want to donate organs  What are the types of advance directives? There are many types of advance directives, and each state has rules about how to use them  You may choose a combination of any of the following:  · Living will: This is a written record of the treatment you want  You can also choose which treatments you do not want, which to limit, and which to stop at a certain time  This includes surgery, medicine, IV fluid, and tube feedings  · Durable power of  for healthcare [facility]): This is a written record that states who you want to make healthcare choices for you when you are unable to make them for yourself  This person, called a proxy, is usually a family member or a friend  You may choose more than 1 proxy  · Do not resuscitate (DNR) order:  A DNR order is used in case your heart stops beating or you stop breathing  It is a request not to have certain forms of treatment, such as CPR  A DNR order may be included in other types of advance directives  · Medical directive: This covers the care that you want if you are in a coma, near death, or unable to make decisions for yourself  You can list the treatments you want for each condition  Treatment may include pain medicine, surgery, blood transfusions, dialysis, IV or tube feedings, and a ventilator (breathing machine)  · Values history: This document has questions about your views, beliefs, and how you feel and think about life  This information can help others choose the care that you would choose  Why are advance directives important? An advance directive helps you control your care  Although spoken wishes may be used, it is better to have your wishes written down   Spoken wishes can be misunderstood, or not followed  Treatments may be given even if you do not want them  An advance directive may make it easier for your family to make difficult choices about your care  Urinary Incontinence   Urinary incontinence (UI)  is when you lose control of your bladder  UI develops because your bladder cannot store or empty urine properly  The 3 most common types of UI are stress incontinence, urge incontinence, or both  Medicines:   · May be given to help strengthen your bladder control  Report any side effects of medication to your healthcare provider  Do pelvic muscle exercises often:  Your pelvic muscles help you stop urinating  Squeeze these muscles tight for 5 seconds, then relax for 5 seconds  Gradually work up to squeezing for 10 seconds  Do 3 sets of 15 repetitions a day, or as directed  This will help strengthen your pelvic muscles and improve bladder control  Train your bladder:  Go to the bathroom at set times, such as every 2 hours, even if you do not feel the urge to go  You can also try to hold your urine when you feel the urge to go  For example, hold your urine for 5 minutes when you feel the urge to go  As that becomes easier, hold your urine for 10 minutes  Self-care:   · Keep a UI record  Write down how often you leak urine and how much you leak  Make a note of what you were doing when you leaked urine  · Drink liquids as directed  You may need to limit the amount of liquid you drink to help control your urine leakage  Do not drink any liquid right before you go to bed  Limit or do not have drinks that contain caffeine or alcohol  · Prevent constipation  Eat a variety of high-fiber foods  Good examples are high-fiber cereals, beans, vegetables, and whole-grain breads  Walking is the best way to trigger your intestines to have a bowel movement  · Exercise regularly and maintain a healthy weight  Weight loss and exercise will decrease pressure on your bladder and help you control your leakage  · Use a catheter as directed  to help empty your bladder  A catheter is a tiny, plastic tube that is put into your bladder to drain your urine  · Go to behavior therapy as directed  Behavior therapy may be used to help you learn to control your urge to urinate  © Copyright [employer] 2018 Information is for End User's use only and may not be sold, redistributed or otherwise used for commercial purposes   All illustrations and images included in CareNotes® are the copyrighted property of [employer]  or [address] [place] [address]

## 2023-02-23 NOTE — ASSESSMENT & PLAN NOTE
Bp controlled, con't current Metoprolol, will follow and re-eval in 6 mos, con't checking BP at home and call if consistently > 140/90

## 2023-03-14 ENCOUNTER — OFFICE VISIT (OUTPATIENT)
Dept: GASTROENTEROLOGY | Facility: CLINIC | Age: 78
End: 2023-03-14

## 2023-03-14 ENCOUNTER — TELEPHONE (OUTPATIENT)
Dept: GASTROENTEROLOGY | Facility: CLINIC | Age: 78
End: 2023-03-14

## 2023-03-14 VITALS
WEIGHT: 123 LBS | SYSTOLIC BLOOD PRESSURE: 130 MMHG | DIASTOLIC BLOOD PRESSURE: 80 MMHG | BODY MASS INDEX: 21.79 KG/M2 | HEIGHT: 63 IN

## 2023-03-14 DIAGNOSIS — Z80.0 FAMILY HISTORY OF COLON CANCER: ICD-10-CM

## 2023-03-14 DIAGNOSIS — K21.9 GASTROESOPHAGEAL REFLUX DISEASE, UNSPECIFIED WHETHER ESOPHAGITIS PRESENT: ICD-10-CM

## 2023-03-14 DIAGNOSIS — R13.19 ESOPHAGEAL DYSPHAGIA: Primary | ICD-10-CM

## 2023-03-14 RX ORDER — FLUTICASONE PROPIONATE 50 MCG
1 SPRAY, SUSPENSION (ML) NASAL DAILY
COMMUNITY

## 2023-03-14 RX ORDER — CETIRIZINE HYDROCHLORIDE 5 MG/1
5 TABLET ORAL DAILY
COMMUNITY

## 2023-03-14 NOTE — PATIENT INSTRUCTIONS
Reflux diet and precautions  Antacids as needed for now  Cut and chew food well, take time eating  Schedule EGD  Schedule colonoscopy

## 2023-03-14 NOTE — PROGRESS NOTES
[email or web address] - Outpatient Consultation  Charly Kohler 70 y o  female MRN: [id number]  Encounter: [id number]    ASSESSMENT AND PLAN:      1  Esophageal dysphagia  2  Gastroesophageal reflux disease, unspecified whether esophagitis present  Intermittent episodes of GERD, heartburn and dysphagia  Most likely reflux esophagitis  Rule out peptic stricture or Schatzki ring or eosinophilic esophagitis  Less likely to be motility or neoplastic  · Reflux diet and precautions  · Antacids as needed for now  · Cut and chew food well, take time eating  · Schedule EGD    3  Family history of colon cancer  First-degree relative with history of colorectal cancer  Negative screenings in the past   No adenomatous polyps identified previously  Due for 5-year high rescreening  · Schedule colonoscopy      Followup Appointment: 3 to 4 months pending results of endoscopy  ______________________________________________________________________    Chief Complaint   Patient presents with   • consult for colonosopy     Feels like food takes awhile to go down esophagus, has burning sensation, not sure if heartburn, burping more       HPI:   Charly Kohler is a 70y o  year old female who presents with dysphagia and GERD  Has been taking care of her elderly mother who has been living with her  Diet 2 weeks ago  Lots of stressors with mother and selling her house  Has been having increasing frequency of episodic heartburn  More burping than usual   Over the past 6 months has had several episodes of experiencing the sensation that food is not going down to her stomach normally and getting hung up  Never had an actual sensation of impaction or getting stuck  No nausea vomiting  No weight loss  Also has frequent throat clearing which is different from her present symptoms, this usually responds to antihistamine  Appetite and stools normal   No melena or hematochezia      Historical Information Past Medical History:   Diagnosis Date   • Breast asymmetry     Resolved: Nov 9, 2017   • Colon polyp    • 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[facility])    • Transaminitis     Last assessed: Mike 3, 2014   • Varicose veins with pain, unspecified laterality     Resolved: June 6, 2017   • Vasovagal syncope     last assessed: Dec 30, 2013     Past Surgical History:   Procedure Laterality Date   • BREAST BIOPSY      unsure laterality or date of biopsy   • CATARACT EXTRACTION, BILATERAL Bilateral 2018 january and april   • COLONOSCOPY      1/2018: hemorrhoids, no polyps      2/13/13 - Hemorrhoids, repeat 5 years d/t family h/o colon cancer   • FOOT SURGERY      mortons neuroma   • MOUTH SURGERY      Tooth Extraction - last assessed: Aug 19, 2014   • MOUTH SURGERY N/A 11/2017   • [place] AMPUTATION TOE METATARSOPHALANGEAL JOINT Right 10/28/2022    Procedure: AMPUTATION TOE Right Second;  Surgeon: Mckenna Washington DPM;  Location: [facility] MAIN OR;  Service: Podiatry   • SKIN BIOPSY Right 07/2018    right thigh   • TONSILLECTOMY      last assessed: Aug 19, 2014     Social History     Substance and Sexual Activity   Alcohol Use Yes   • Alcohol/week: 2 0 - 4 0 standard drinks   • Types: 2 - 4 Glasses of wine per week    Comment: 1 per day - social/ drinks wine      Social History     Substance and Sexual Activity   Drug Use No     Social History     Tobacco Use   Smoking Status Never   Smokeless Tobacco Never     Family History   Problem Relation Age of Onset   • Colon polyps Mother    • Colon cancer Mother         diagnosed in her 45s and 66's   • Endometrial cancer Mother         63's   • Lung cancer Father 80   • No Known Problems Sister    • Colon cancer Brother 64   • No Known Problems Maternal Grandmother    • No Known Problems Maternal Grandfather    • No Known Problems Paternal Grandmother    • No Known Problems Paternal Grandfather    • Colon cancer Maternal Aunt         unknown age of onset   • No Known Problems Maternal Aunt    • No Known Problems Paternal Aunt    • No Known Problems Paternal Aunt    • No Known Problems Paternal Aunt    • No Known Problems Paternal Aunt    • No Known Problems Paternal Aunt        Meds/Allergies     Current Outpatient Medications:   •  betamethasone, augmented, (DIPROLENE) 0 05 % ointment  •  cetirizine (ZyrTEC) 5 MG tablet  •  estradiol (Yuvafem) 10 MCG TABS vaginal tablet  •  fluticasone (FLONASE) 50 mcg/act nasal spray  •  latanoprost (XALATAN) 0 005 % ophthalmic solution  •  metoprolol succinate (TOPROL-XL) 25 mg 24 hr tablet  •  zolpidem (AMBIEN) 5 mg tablet    No Known Allergies    PHYSICAL EXAM:    Blood pressure 130/80, height 5' 3" (1 6 m), weight 55 8 kg (123 lb), not currently breastfeeding  Body mass index is 21 79 kg/m²  General Appearance: NAD, cooperative, alert  Eyes: Anicteric, PERRLA, EOMI  ENT:  Normocephalic, atraumatic, normal mucosa  Neck:  Supple, symmetrical, trachea midline,   Resp:  Clear to auscultation bilaterally; no rales, rhonchi or wheezing; respirations unlabored   CV:  S1 S2, Regular rate and rhythm; no murmur, rub, or gallop  GI:  Soft, non-tender, non-distended; normal bowel sounds; no masses, no organomegaly   Rectal: Deferred  Musculoskeletal: No cyanosis, clubbing or edema  Normal ROM    Skin:  No jaundice, rashes, or lesions   Heme/Lymph: No palpable cervical lymphadenopathy  Psych: Normal affect, good eye contact  Neuro: No gross deficits, AAOx3    Lab Results:   Lab Results   Component Value Date    WBC 3 52 (L) 03/30/2022    HGB 14 2 03/30/2022    HCT 44 0 03/30/2022     (H) 03/30/2022     03/30/2022     Lab Results   Component Value Date     01/02/2014    K 4 6 08/11/2022     08/11/2022    CO2 30 08/11/2022    ANIONGAP 2 (L) 01/02/2014    BUN 19 08/11/2022    CREATININE 0 78 08/11/2022    GLUCOSE 76 01/02/2014    GLUF 85 08/11/2022    CALCIUM 9 5 08/11/2022    AST 26 03/30/2022    ALT 36 03/30/2022    ALKPHOS 73 03/30/2022    PROT 6 9 05/29/2014    BILITOT 0 6 05/29/2014    EGFR 74 08/11/2022     No results found for: IRON, TIBC, FERRITIN  No results found for: LIPASE    Radiology Results:   No results found  REVIEW OF SYSTEMS:    CONSTITUTIONAL: Denies any fever, chills, rigors, and weight loss  HEENT: No earache or tinnitus  Denies hearing loss or visual disturbances  CARDIOVASCULAR: No chest pain or palpitations  RESPIRATORY: Denies any cough, hemoptysis, shortness of breath or dyspnea on exertion  GASTROINTESTINAL: As noted in the History of Present Illness  GENITOURINARY: No problems with urination  Denies any hematuria or dysuria  NEUROLOGIC: No dizziness or vertigo, denies headaches  MUSCULOSKELETAL: Denies any muscle or joint pain  SKIN: Denies skin rashes or itching  ENDOCRINE: Denies excessive thirst  Denies intolerance to heat or cold  PSYCHOSOCIAL: Denies depression or anxiety  Denies any recent memory loss

## 2023-03-14 NOTE — LETTER
March 14, 2023     Stacey Forward, [address]  [address]  [address] [address]    Patient: Alejandro Gottlieb   YOB: 1945   Date of Visit: 3/14/2023       Dear Dr Remigio Arreola: Thank you for referring Blaynepanfilo Heard to me for evaluation  Below are my notes for this consultation  If you have questions, please do not hesitate to call me  I look forward to following your patient along with you  Sincerely,        Jamison Armstrong MD        CC: No Recipients  Jamison Armstrong MD  3/14/2023  2:08 PM  Sign when Signing Visit    [address] [facility] - Outpatient Consultation  Alejandro Gottlieb 70 y o  female MRN: [id number]  Encounter: [id number]    ASSESSMENT AND PLAN:      1  Esophageal dysphagia  2  Gastroesophageal reflux disease, unspecified whether esophagitis present  Intermittent episodes of GERD, heartburn and dysphagia  Most likely reflux esophagitis  Rule out peptic stricture or Schatzki ring or eosinophilic esophagitis  Less likely to be motility or neoplastic  · Reflux diet and precautions  · Antacids as needed for now  · Cut and chew food well, take time eating  · Schedule EGD    3  Family history of colon cancer  First-degree relative with history of colorectal cancer  Negative screenings in the past   No adenomatous polyps identified previously  Due for 5-year high rescreening  · Schedule colonoscopy      Followup Appointment: 3 to 4 months pending results of endoscopy  ______________________________________________________________________    Chief Complaint   Patient presents with   • consult for colonosopy     Feels like food takes awhile to go down esophagus, has burning sensation, not sure if heartburn, burping more       HPI:   Alejandro Gottlieb is a 70y o  year old female who presents with dysphagia and GERD  Has been taking care of her elderly mother who has been living with her  Diet 2 weeks ago  Lots of stressors with mother and selling her house  Has been having increasing frequency of episodic heartburn  More burping than usual   Over the past 6 months has had several episodes of experiencing the sensation that food is not going down to her stomach normally and getting hung up  Never had an actual sensation of impaction or getting stuck  No nausea vomiting  No weight loss  Also has frequent throat clearing which is different from her present symptoms, this usually responds to antihistamine  Appetite and stools normal   No melena or hematochezia  Historical Information   Past Medical History:   Diagnosis Date   • Breast asymmetry     Resolved: Nov 9, 2017   • Colon polyp    • 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[facility])    • Transaminitis     Last assessed: Mike 3, 2014   • Varicose veins with pain, unspecified laterality     Resolved: June 6, 2017   • Vasovagal syncope     last assessed: Dec 30, 2013     Past Surgical History:   Procedure Laterality Date   • BREAST BIOPSY      unsure laterality or date of biopsy   • CATARACT EXTRACTION, BILATERAL Bilateral 2018 january and april   • COLONOSCOPY      1/2018: hemorrhoids, no polyps      2/13/13 - Hemorrhoids, repeat 5 years d/t family h/o colon cancer   • FOOT SURGERY      mortons neuroma   • MOUTH SURGERY      Tooth Extraction - last assessed: Aug 19, 2014   • MOUTH SURGERY N/A 11/2017   • [place] AMPUTATION TOE METATARSOPHALANGEAL JOINT Right 10/28/2022    Procedure: AMPUTATION TOE Right Second;  Surgeon: Esau Brady DPM;  Location: [address] [place] [address];  Service: Podiatry   • SKIN BIOPSY Right 07/2018    right thigh   • TONSILLECTOMY      last assessed: Aug 19, 2014     Social History     Substance and Sexual Activity   Alcohol Use Yes   • Alcohol/week: 2 0 - 4 0 standard drinks   • Types: 2 - 4 Glasses of wine per week    Comment: 1 per day - social/ drinks wine      Social History     Substance and Sexual Activity   Drug Use No     Social History     Tobacco Use   Smoking Status Never   Smokeless Tobacco Never Family History   Problem Relation Age of Onset   • Colon polyps Mother    • Colon cancer Mother         diagnosed in her 45s and 66's   • Endometrial cancer Mother         63's   • Lung cancer Father 80   • No Known Problems Sister    • Colon cancer Brother 64   • No Known Problems Maternal Grandmother    • No Known Problems Maternal Grandfather    • No Known Problems Paternal Grandmother    • No Known Problems Paternal Grandfather    • Colon cancer Maternal Aunt         unknown age of onset   • No Known Problems Maternal Aunt    • No Known Problems Paternal Aunt    • No Known Problems Paternal Aunt    • No Known Problems Paternal Aunt    • No Known Problems Paternal Aunt    • No Known Problems Paternal Aunt        Meds/Allergies      Current Outpatient Medications:   •  betamethasone, augmented, (DIPROLENE) 0 05 % ointment  •  cetirizine (ZyrTEC) 5 MG tablet  •  estradiol (Yuvafem) 10 MCG TABS vaginal tablet  •  fluticasone (FLONASE) 50 mcg/act nasal spray  •  latanoprost (XALATAN) 0 005 % ophthalmic solution  •  metoprolol succinate (TOPROL-XL) 25 mg 24 hr tablet  •  zolpidem (AMBIEN) 5 mg tablet    No Known Allergies    PHYSICAL EXAM:    Blood pressure 130/80, height 5' 3" (1 6 m), weight 55 8 kg (123 lb), not currently breastfeeding  Body mass index is 21 79 kg/m²  General Appearance: NAD, cooperative, alert  Eyes: Anicteric, PERRLA, EOMI  ENT:  Normocephalic, atraumatic, normal mucosa  Neck:  Supple, symmetrical, trachea midline,   Resp:  Clear to auscultation bilaterally; no rales, rhonchi or wheezing; respirations unlabored   CV:  S1 S2, Regular rate and rhythm; no murmur, rub, or gallop  GI:  Soft, non-tender, non-distended; normal bowel sounds; no masses, no organomegaly   Rectal: Deferred  Musculoskeletal: No cyanosis, clubbing or edema  Normal ROM    Skin:  No jaundice, rashes, or lesions   Heme/Lymph: No palpable cervical lymphadenopathy  Psych: Normal affect, good eye contact  Neuro: No gross deficits, AAOx3    Lab Results:   Lab Results   Component Value Date    WBC 3 52 (L) 03/30/2022    HGB 14 2 03/30/2022    HCT 44 0 03/30/2022     (H) 03/30/2022     03/30/2022     Lab Results   Component Value Date     01/02/2014    K 4 6 08/11/2022     08/11/2022    CO2 30 08/11/2022    ANIONGAP 2 (L) 01/02/2014    BUN 19 08/11/2022    CREATININE 0 78 08/11/2022    GLUCOSE 76 01/02/2014    GLUF 85 08/11/2022    CALCIUM 9 5 08/11/2022    AST 26 03/30/2022    ALT 36 03/30/2022    ALKPHOS 73 03/30/2022    PROT 6 9 05/29/2014    BILITOT 0 6 05/29/2014    EGFR 74 08/11/2022     No results found for: IRON, TIBC, FERRITIN  No results found for: LIPASE    Radiology Results:   No results found  REVIEW OF SYSTEMS:    CONSTITUTIONAL: Denies any fever, chills, rigors, and weight loss  HEENT: No earache or tinnitus  Denies hearing loss or visual disturbances  CARDIOVASCULAR: No chest pain or palpitations  RESPIRATORY: Denies any cough, hemoptysis, shortness of breath or dyspnea on exertion  GASTROINTESTINAL: As noted in the History of Present Illness  GENITOURINARY: No problems with urination  Denies any hematuria or dysuria  NEUROLOGIC: No dizziness or vertigo, denies headaches  MUSCULOSKELETAL: Denies any muscle or joint pain  SKIN: Denies skin rashes or itching  ENDOCRINE: Denies excessive thirst  Denies intolerance to heat or cold  PSYCHOSOCIAL: Denies depression or anxiety  Denies any recent memory loss

## 2023-03-22 ENCOUNTER — TELEPHONE (OUTPATIENT)
Dept: OTHER | Facility: OTHER | Age: 78
End: 2023-03-22

## 2023-03-22 NOTE — TELEPHONE ENCOUNTER
Patient called stating she would like to reschedule her colonoscopy and she also would like to know if any samples of Clenpiq came in yet that she could  at the office  Please call patient back to discuss

## 2023-03-22 NOTE — TELEPHONE ENCOUNTER
Rescheduled pt to 4/25/23 - pt aware we do not have any clenpiq samples   Will hold one for pt when we receive them

## 2023-03-30 ENCOUNTER — OFFICE VISIT (OUTPATIENT)
Dept: FAMILY MEDICINE CLINIC | Facility: HOSPITAL | Age: 78
End: 2023-03-30

## 2023-03-30 VITALS
WEIGHT: 124.8 LBS | HEIGHT: 63 IN | BODY MASS INDEX: 22.11 KG/M2 | HEART RATE: 74 BPM | DIASTOLIC BLOOD PRESSURE: 80 MMHG | TEMPERATURE: 97.6 F | SYSTOLIC BLOOD PRESSURE: 138 MMHG

## 2023-03-30 DIAGNOSIS — J34.0 CELLULITIS OF NASAL TIP: Primary | ICD-10-CM

## 2023-03-30 DIAGNOSIS — S90.121A: ICD-10-CM

## 2023-03-30 RX ORDER — CEPHALEXIN 500 MG/1
500 CAPSULE ORAL 3 TIMES DAILY
Qty: 21 CAPSULE | Refills: 0 | Status: SHIPPED | OUTPATIENT
Start: 2023-03-30 | End: 2023-04-06

## 2023-03-30 NOTE — PROGRESS NOTES
Name: Shiva Chavez      : 1945      MRN: [id number]  Encounter Provider: Ninfa Hensley DO  Encounter Date: 3/30/2023   Encounter department: [facility]  Cellulitis of nasal tip  Comments:  Due to significant tenderness on exam with erythema/swelling will start PO Keflex and advised warm compresses every 2-3 hrs, call with new/worse symptoms or if no better at all by end of abx  Orders:  -     cephalexin (KEFLEX) 500 mg capsule; Take 1 capsule (500 mg total) by mouth 3 (three) times a day for 7 days    2  Traumatic ecchymosis of toe, right, initial encounter  Comments:  Reassured pt that looks like she likely stepped on something/stubbed toe with resulting bruise and some swelling, advised Epsom salt soaks and monitor - call w/new or worse symptoms           Subjective      HPI P there for an acute visit    Approx a week or so ago she blew her nose and noted a tender spot in her L nares  She states symptoms started to improve a bit and then a day or so ago symptoms suddenly seemed to worsen again  She states the are is achy to the point when she lays on her L side it hurts  She notes no bump/lesion/ulcer and denies drainage discharge  She notes no other cold symptoms  She has been using Flonase and Zyrtec for allergies with benefit  She notes some R great toe pain and feels like a little fluid at the tip of the nose  She does not recall stepping on anything  Review of Systems   Constitutional: Negative for chills and fever  HENT: Negative for congestion and sore throat  Eyes: Negative for pain and visual disturbance  Respiratory: Negative for cough and shortness of breath  Cardiovascular: Negative for chest pain, palpitations and leg swelling  Gastrointestinal: Negative for blood in stool  Musculoskeletal: Positive for arthralgias and joint swelling  Skin: Negative for rash and wound     Neurological: Negative for "dizziness and headaches  Psychiatric/Behavioral: Negative for confusion  Current Outpatient Medications on File Prior to Visit   Medication Sig   • betamethasone, augmented, (DIPROLENE) 0 05 % ointment APPLY TO RASH ON UPPER/ LOWER EXTREMITIES TWICE DAILY X10-14 DAYS (DO NOT USE ON FACE OR BODY FOLDS)   • cetirizine (ZyrTEC) 5 MG tablet Take 5 mg by mouth daily 1/2 tablet   • estradiol (Yuvafem) 10 MCG TABS vaginal tablet Insert 1 tablet (10 mcg total) into the vagina 2 (two) times a week At bedtime   • fluticasone (FLONASE) 50 mcg/act nasal spray 1 spray into each nostril daily   • latanoprost (XALATAN) 0 005 % ophthalmic solution INSTILL 1 DROP INTO BOTH EYES AT BEDTIME   • metoprolol succinate (TOPROL-XL) 25 mg 24 hr tablet TAKE 1 TABLET (25 MG TOTAL) BY MOUTH DAILY  • zolpidem (AMBIEN) 5 mg tablet Take   5-1 tab as needed for sleep       Objective     /80   Pulse 74   Temp 97 6 °F (36 4 °C) (Tympanic)   Ht 5' 3\" (1 6 m)   Wt 56 6 kg (124 lb 12 8 oz)   LMP  (LMP Unknown)   BMI 22 11 kg/m²     Physical Exam  Vitals and nursing note reviewed  Constitutional:       General: She is not in acute distress  Appearance: She is well-developed  She is not ill-appearing  HENT:      Head: Normocephalic and atraumatic  Eyes:      General:         Right eye: No discharge  Left eye: No discharge  Conjunctiva/sclera: Conjunctivae normal    Neck:      Trachea: No tracheal deviation  Cardiovascular:      Rate and Rhythm: Normal rate and regular rhythm  Heart sounds: Normal heart sounds  No murmur heard  No friction rub  Pulmonary:      Effort: Pulmonary effort is normal  No respiratory distress  Breath sounds: Normal breath sounds  No wheezing, rhonchi or rales  Musculoskeletal:         General: Swelling, tenderness and signs of injury present  No deformity  Cervical back: Neck supple        Comments: Tip of R great toe - plantar surface - small superficial " ecchymosis with some tenderness to palp at tip of toe, 2+ DP pulse, toes cold but good cap refill present   Skin:     General: Skin is warm  Coloration: Skin is not pale  Findings: Bruising and rash present  Comments: R nares small red papule with some underlying induration and tenderness to palp, R nares mildly erythema and some swelling present   Neurological:      Mental Status: She is alert  Motor: No abnormal muscle tone  Psychiatric:         Mood and Affect: Mood normal          Behavior: Behavior normal          Thought Content:  Thought content normal          Judgment: Judgment normal        Kamala Campos DO

## 2023-04-25 ENCOUNTER — HOSPITAL ENCOUNTER (OUTPATIENT)
Dept: GASTROENTEROLOGY | Facility: AMBULATORY SURGERY CENTER | Age: 78
Discharge: HOME | End: 2023-04-25

## 2023-04-25 ENCOUNTER — ANESTHESIA EVENT (OUTPATIENT)
Dept: GASTROENTEROLOGY | Facility: AMBULATORY SURGERY CENTER | Age: 78
End: 2023-04-25

## 2023-04-25 ENCOUNTER — ANESTHESIA (OUTPATIENT)
Dept: GASTROENTEROLOGY | Facility: AMBULATORY SURGERY CENTER | Age: 78
End: 2023-04-25

## 2023-04-25 VITALS
DIASTOLIC BLOOD PRESSURE: 59 MMHG | HEART RATE: 77 BPM | TEMPERATURE: 98.7 F | HEIGHT: 63 IN | SYSTOLIC BLOOD PRESSURE: 124 MMHG | BODY MASS INDEX: 21.97 KG/M2 | WEIGHT: 124 LBS | OXYGEN SATURATION: 96 % | RESPIRATION RATE: 18 BRPM

## 2023-04-25 DIAGNOSIS — Z80.0 FAMILY HISTORY OF COLON CANCER: ICD-10-CM

## 2023-04-25 DIAGNOSIS — K21.9 GASTROESOPHAGEAL REFLUX DISEASE, UNSPECIFIED WHETHER ESOPHAGITIS PRESENT: ICD-10-CM

## 2023-04-25 DIAGNOSIS — R13.19 ESOPHAGEAL DYSPHAGIA: ICD-10-CM

## 2023-04-25 RX ORDER — SODIUM CHLORIDE, SODIUM LACTATE, POTASSIUM CHLORIDE, CALCIUM CHLORIDE 600; 310; 30; 20 MG/100ML; MG/100ML; MG/100ML; MG/100ML
50 INJECTION, SOLUTION INTRAVENOUS CONTINUOUS
Status: DISCONTINUED | OUTPATIENT
Start: 2023-04-25 | End: 2023-04-29 | Stop reason: HOSPADM

## 2023-04-25 RX ORDER — PROPOFOL 10 MG/ML
INJECTION, EMULSION INTRAVENOUS AS NEEDED
Status: DISCONTINUED | OUTPATIENT
Start: 2023-04-25 | End: 2023-04-25

## 2023-04-25 RX ORDER — FAMOTIDINE 40 MG/1
40 TABLET, FILM COATED ORAL
Qty: 30 TABLET | Refills: 5 | Status: SHIPPED | OUTPATIENT
Start: 2023-04-25

## 2023-04-25 RX ORDER — GLYCOPYRROLATE 0.2 MG/ML
INJECTION INTRAMUSCULAR; INTRAVENOUS AS NEEDED
Status: DISCONTINUED | OUTPATIENT
Start: 2023-04-25 | End: 2023-04-25

## 2023-04-25 RX ORDER — LIDOCAINE HYDROCHLORIDE 20 MG/ML
INJECTION, SOLUTION EPIDURAL; INFILTRATION; INTRACAUDAL; PERINEURAL AS NEEDED
Status: DISCONTINUED | OUTPATIENT
Start: 2023-04-25 | End: 2023-04-25

## 2023-04-25 RX ADMIN — PROPOFOL 50 MG: 10 INJECTION, EMULSION INTRAVENOUS at 13:21

## 2023-04-25 RX ADMIN — PROPOFOL 50 MG: 10 INJECTION, EMULSION INTRAVENOUS at 13:33

## 2023-04-25 RX ADMIN — PROPOFOL 30 MG: 10 INJECTION, EMULSION INTRAVENOUS at 13:30

## 2023-04-25 RX ADMIN — SODIUM CHLORIDE, SODIUM LACTATE, POTASSIUM CHLORIDE, CALCIUM CHLORIDE 50 ML/HR: 600; 310; 30; 20 INJECTION, SOLUTION INTRAVENOUS at 12:54

## 2023-04-25 RX ADMIN — PROPOFOL 50 MG: 10 INJECTION, EMULSION INTRAVENOUS at 13:11

## 2023-04-25 RX ADMIN — PROPOFOL 50 MG: 10 INJECTION, EMULSION INTRAVENOUS at 13:24

## 2023-04-25 RX ADMIN — PROPOFOL 20 MG: 10 INJECTION, EMULSION INTRAVENOUS at 13:27

## 2023-04-25 RX ADMIN — PROPOFOL 50 MG: 10 INJECTION, EMULSION INTRAVENOUS at 13:07

## 2023-04-25 RX ADMIN — LIDOCAINE HYDROCHLORIDE 100 MG: 20 INJECTION, SOLUTION EPIDURAL; INFILTRATION; INTRACAUDAL; PERINEURAL at 13:04

## 2023-04-25 RX ADMIN — SODIUM CHLORIDE, SODIUM LACTATE, POTASSIUM CHLORIDE, CALCIUM CHLORIDE: 600; 310; 30; 20 INJECTION, SOLUTION INTRAVENOUS at 13:33

## 2023-04-25 RX ADMIN — GLYCOPYRROLATE 0.2 MG: 0.2 INJECTION INTRAMUSCULAR; INTRAVENOUS at 13:04

## 2023-04-25 RX ADMIN — PROPOFOL 150 MG: 10 INJECTION, EMULSION INTRAVENOUS at 13:04

## 2023-04-25 RX ADMIN — PROPOFOL 50 MG: 10 INJECTION, EMULSION INTRAVENOUS at 13:16

## 2023-04-25 NOTE — H&P
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[facility])    • Transaminitis     Last assessed: Mike 3, 2014   • Varicose veins with pain, unspecified laterality     Resolved: June 6, 2017   • Vasovagal syncope     last assessed: Dec 30, 2013     Past Surgical History:   Procedure Laterality Date   • BREAST BIOPSY      unsure laterality or date of biopsy   • CATARACT EXTRACTION, BILATERAL Bilateral 2018 january and april   • COLONOSCOPY      1/2018: hemorrhoids, no polyps      2/13/13 - Hemorrhoids, repeat 5 years d/t family h/o colon cancer   • FOOT SURGERY      mortons neuroma   • MOUTH SURGERY      Tooth Extraction - last assessed: Aug 19, 2014   • MOUTH SURGERY N/A 11/2017   • [place] AMPUTATION TOE METATARSOPHALANGEAL JOINT Right 10/28/2022    Procedure: AMPUTATION TOE Right Second;  Surgeon: Flako Araujo DPM;  Location: [address] [place] [address];  Service: Podiatry   • SKIN BIOPSY Right 07/2018    right thigh   • TONSILLECTOMY      last assessed: Aug 19, 2014     Social History   Social History     Substance and Sexual Activity   Alcohol Use Yes   • Alcohol/week: 2 0 - 4 0 standard drinks   • Types: 2 - 4 Glasses of wine per week    Comment: 1 per day - social/ drinks wine      Social History     Substance and Sexual Activity   Drug Use No     Social History     Tobacco Use   Smoking Status Never   Smokeless Tobacco Never     Family History   Problem Relation Age of Onset   • Colon polyps Mother    • Colon cancer Mother         diagnosed in her 45s and 66's   • Endometrial Staff reached out to pt about new location and appt. Pt confirmed appt.   "cancer Mother         63's   • Lung cancer Father 80   • No Known Problems Sister    • Colon cancer Brother 64   • No Known Problems Maternal Grandmother    • No Known Problems Maternal Grandfather    • No Known Problems Paternal Grandmother    • No Known Problems Paternal Grandfather    • Colon cancer Maternal Aunt         unknown age of onset   • No Known Problems Maternal Aunt    • No Known Problems Paternal Aunt    • No Known Problems Paternal Aunt    • No Known Problems Paternal Aunt    • No Known Problems Paternal Aunt    • No Known Problems Paternal Aunt        Meds/Allergies       Current Outpatient Medications:   •  cetirizine (ZyrTEC) 5 MG tablet  •  fluticasone (FLONASE) 50 mcg/act nasal spray  •  latanoprost (XALATAN) 0 005 % ophthalmic solution  •  metoprolol succinate (TOPROL-XL) 25 mg 24 hr tablet  •  zolpidem (AMBIEN) 5 mg tablet  •  betamethasone, augmented, (DIPROLENE) 0 05 % ointment  •  estradiol (Yuvafem) 10 MCG TABS vaginal tablet    Current Facility-Administered Medications:   •  lactated ringers infusion, 50 mL/hr, Intravenous, Continuous, 50 mL/hr at 04/25/23 1254    No Known Allergies    Objective     /70   Pulse 66   Temp 98 7 °F (37 1 °C) (Temporal)   Resp 16   Ht 5' 3\" (1 6 m)   Wt 56 2 kg (124 lb)   LMP  (LMP Unknown)   SpO2 100%   BMI 21 97 kg/m²     PHYSICAL EXAM    Gen: NAD AAOx3  Head: Normocephalic, Atraumatic  CV: S1S2 RRR no m/r/g  CHEST: Clear b/l no c/r/w  ABD: soft, +BS NT/ND  EXT: no edema    ASSESSMENT/PLAN:  This is a 70y o  year old female here for EGD and colonoscopy, and she is stable and optimized for her procedure        "

## 2023-04-25 NOTE — ANESTHESIA PREPROCEDURE EVALUATION
Procedure:  EGD  COLONOSCOPY    Relevant Problems   ANESTHESIA (within normal limits)      CARDIO   (+) Primary hypertension      GI/HEPATIC   (+) Esophageal dysphagia   (+) Gastroesophageal reflux disease        Physical Exam    Airway    Mallampati score: I  TM Distance: >3 FB  Neck ROM: full     Dental   No notable dental hx     Cardiovascular  Cardiovascular exam normal    Pulmonary  Pulmonary exam normal     Other Findings        Anesthesia Plan  ASA Score- 2     Anesthesia Type- IV sedation with anesthesia with ASA Monitors  Additional Monitors:   Airway Plan:     Comment: I discussed risks (reviewed with patient on the anesthesia consent form), benefits and alternatives of monitored sedation including the possibility under sedation to have recall or mild discomfort          Plan Factors-    Chart reviewed  Patient summary reviewed  Induction- intravenous  Postoperative Plan-     Informed Consent- Anesthetic plan and risks discussed with patient  I personally reviewed this patient with the CRNA  Discussed and agreed on the Anesthesia Plan with the CRNA  Dipti Armendariz

## 2023-04-25 NOTE — PROGRESS NOTES
Procedure results reviewed with patient by Dr Aiyana Jaramillo  Pt given written and verbal d/c instructions  Laney Jimenez

## 2023-04-25 NOTE — ANESTHESIA POSTPROCEDURE EVALUATION
Post-Op Assessment Note    CV Status:  Stable  Pain Score: 0    Pain management: adequate     Mental Status:  Sleepy   Hydration Status:  Euvolemic and stable   PONV Controlled:  None   Airway Patency:  Patent      Post Op Vitals Reviewed: Yes            No notable events documented      /75 (04/25/23 1337)    Temp      Pulse 67 (04/25/23 1337)   Resp 14 (04/25/23 1337)    SpO2 98 % (04/25/23 1337) Abdomen soft, non-tender and non-distended, no rebound, no guarding and no masses. no hepatosplenomegaly.

## 2023-05-14 NOTE — PROGRESS NOTES
Daily Note     Today's date: 3/9/2021  Patient name: Reno Tello  : 1945  MRN: [id number]  Referring provider: Jack Calderón DO  Dx:   Encounter Diagnosis     ICD-10-CM    1  Acute pain of left shoulder  M25 512        Start Time: 820  Stop Time: 0900  Total time in clinic (min): 40 minutes    Subjective: pt states that functional she is making gains but does note that she still has pain with resisted ER in standing and fatigues quickly with bent over exercises  Objective: See treatment diary below      Assessment: moderate amount of tightness in UT and infra   Reviewed alphabet and serratus punch along with sidelying ER,  Pt was given UT and levator stretch and told to perform several times throughout day for stretching left side to help decrease tightness in these area  Pt verbalizes understanding  Plan: Continue per plan of care        Precautions: hx of guillan-barre      Manuals 1/20 1/27 2/8 2/10 2/17 2/24 3/3 3/9     Left subscap, and infra stm DB DB + bicep DB DL DL DL DL DL     Left pec pin and stretch  DB DB DL DL DL DL DL     UT tpr        DL                  Neuro Re-Ed             S/l Er 1# 2x10            Supine open book x12 reviewed performance, and proper form 3'           Prone MT 2x10            Bent over row      x20       Bent over ext      x10       Serratus punch into flexion       Red x15 Red x15     Serratus ABC       x1 x1     Ther Ex             pec doorway low hand 15''x3    15''x3     reviewed         Bicep stretch (elbow bent)  3''x10           Shoulder row  blk 2x10           shld ext  Blue 2x10           shld Er  Red 2x10 (to neutral) bilateral green 2x10 reviewed   Single arm with red band x10      shld IR  Red 2x10      Ut/levator stretch x5 ea     Supine H abd   gtb 2x10 reviewed         Supine pnf   gtb 2x10 reviewed         Ther Activity                                       Gait Training                                       Modalities Airway patent, Nasal mucosa clear. Mouth with normal mucosa. Throat has no vesicles, no oropharyngeal exudates and uvula is midline.

## 2023-07-25 ENCOUNTER — OFFICE VISIT (OUTPATIENT)
Dept: PHYSICAL THERAPY | Facility: CLINIC | Age: 78
End: 2023-07-25
Payer: MEDICARE

## 2023-07-25 DIAGNOSIS — M25.511 CHRONIC RIGHT SHOULDER PAIN: Primary | ICD-10-CM

## 2023-07-25 DIAGNOSIS — M25.552 PAIN IN LEFT HIP: ICD-10-CM

## 2023-07-25 DIAGNOSIS — G89.29 CHRONIC RIGHT SHOULDER PAIN: Primary | ICD-10-CM

## 2023-07-25 PROCEDURE — 97162 PT EVAL MOD COMPLEX 30 MIN: CPT | Performed by: PHYSICAL THERAPIST

## 2023-07-25 PROCEDURE — 97110 THERAPEUTIC EXERCISES: CPT | Performed by: PHYSICAL THERAPIST

## 2023-07-25 NOTE — PROGRESS NOTES
PT Evaluation     Today's date: 2023  Patient name: Missael Amaro  : 1945  MRN: [id number]  Referring provider: Didier Leo, *  Dx:   Encounter Diagnosis     ICD-10-CM    1. Chronic right shoulder pain  M25.511     G89.29       2. Pain in left hip  M25.552                      Assessment  Assessment details: Missael Amaro is a 70 y.o. female who presents with pain, decreased strength, decreased ROM and ambulatory dysfunction. Due to these impairments, patient has difficulty performing a/iadls, recreational activities and engaging in social activities. Patient's clinical presentation is consistent with the diagnosis of Chronic right shoulder pain  (primary encounter diagnosis)Pain in left hip. Patient has been educated in home exercise program and plan of care. Patient would benefit from skilled physical therapy services to address their aforementioned functional limitations and progress towards prior level of function and independence with home exercise program.   Impairments: abnormal gait, abnormal muscle firing, abnormal or restricted ROM, activity intolerance, impaired physical strength, lacks appropriate home exercise program and pain with function    Symptom irritability: lowUnderstanding of Dx/Px/POC: good   Prognosis: good    Goals  Short Term Goals: Target Date 4 weeks  1. Pt will initiate and advance HEP. 2. Pt will have < 3/10 pain  3. Pt will have full arom of the left hip  4. Pt will be able to sleep with out pain with out difficulty    Long Term Goals: Target Date 8 weeks  1. Pt will demonstrate independence in HEP. 2. Pt will have <1/10 pain  3. Pt will have full core and B LE strength  4.  Pt will be able to negotiate stairs with out difficulty      Plan  Patient would benefit from: skilled PT  Planned modality interventions: cryotherapy and thermotherapy: hydrocollator packs  Planned therapy interventions: joint mobilization, manual therapy, patient education, postural training, activity modification, abdominal trunk stabilization, body mechanics training, flexibility, functional ROM exercises, graded exercise, home exercise program, neuromuscular re-education, strengthening, stretching, therapeutic activities, therapeutic exercise, motor coordination training, gait training, balance/weight bearing training and ADL training  Frequency: 2x month  Duration in weeks: 8  Plan of Care beginning date: 2023  Plan of Care expiration date: 2023  Treatment plan discussed with: patient        Subjective Evaluation    History of Present Illness  Mechanism of injury: Pt notes about a 3-4 month hx of right shld and left hip pain. The pain has been off and on for both. She denies any injury but a sudden insidious onset of pain. Pt notes that the shld hurts when she sleeps on it or reaches out to the side. Pt notes that she has knee pain with transitions from sit to stand, and with stairs. Pain can be sharp  Patient Goals  Patient goals for therapy: decreased pain, increased motion, independence with ADLs/IADLs, increased strength and return to sport/leisure activities    Pain  Current pain ratin  At best pain ratin  At worst pain ratin  Location: right shoulder left hip  Quality: dull ache, discomfort, throbbing and tight          Objective     Palpation   Left   Tenderness of the gluteus rere, gluteus medius and piriformis. Right   Tenderness of the infraspinatus, middle trapezius and upper trapezius. Tenderness     Left Hip   Tenderness in the greater trochanter.      Neurological Testing     Sensation     Shoulder   Left Shoulder   Intact: light touch    Right Shoulder   Intact: light touch    Active Range of Motion   Left Shoulder   Flexion: WFL  Abduction: WFL    Right Shoulder   Flexion: WFL  Abduction: WFL  External rotation BTH: WFL  Internal rotation BTB: L1 with pain  Left Hip   Normal active range of motion  Abduction: with pain    Right Hip   Normal active range of motion    Tests     Right Shoulder   Positive Hawkin's, Neer's and passive horizontal adduction. Negative full can and painful arc.               Precautions: none      Manuals 7/25            Right infraspinatus stm DB            Left gluteal stm DB                                      Neuro Re-Ed             Bridge band nv            Bridge ball nv            Dead bug nv            Sit to stand band nv                                                   Ther Ex             S/l abd(shld) nv            S/l flex to H abd nv            S/l Er nv            shld row nv            Supinated shld ext nv            B shld ER nv                                      Ther Activity                                       Gait Training                                       Modalities

## 2023-08-01 ENCOUNTER — APPOINTMENT (OUTPATIENT)
Dept: PHYSICAL THERAPY | Facility: CLINIC | Age: 78
End: 2023-08-01
Payer: MEDICARE

## 2023-08-08 ENCOUNTER — OFFICE VISIT (OUTPATIENT)
Dept: PHYSICAL THERAPY | Facility: CLINIC | Age: 78
End: 2023-08-08
Payer: MEDICARE

## 2023-08-08 DIAGNOSIS — G89.29 CHRONIC RIGHT SHOULDER PAIN: Primary | ICD-10-CM

## 2023-08-08 DIAGNOSIS — M25.511 CHRONIC RIGHT SHOULDER PAIN: Primary | ICD-10-CM

## 2023-08-08 DIAGNOSIS — M25.552 PAIN IN LEFT HIP: ICD-10-CM

## 2023-08-08 PROCEDURE — 97140 MANUAL THERAPY 1/> REGIONS: CPT | Performed by: PHYSICAL THERAPIST

## 2023-08-08 PROCEDURE — 97110 THERAPEUTIC EXERCISES: CPT | Performed by: PHYSICAL THERAPIST

## 2023-08-08 NOTE — PROGRESS NOTES
Daily Note     Today's date: 2023  Patient name: Polo Jacobs  : 1945  MRN: [id number]  Referring provider: Angela Escobar, *  Dx:   Encounter Diagnosis     ICD-10-CM    1. Chronic right shoulder pain  M25.511     G89.29       2. Pain in left hip  M25.552                      Subjective: pt notes that she has only been doing the exercises for 8 days as she was away, but does note some small improvements so far. Objective: See treatment diary below      Assessment: pt was very tender over the left GT and right infraspinatus. This improved in the shoulder with manuals but did have minimal improvement in the left GT. Pt did have changes made for HEP to avoid hip IR in s/l, and not to push exericses to pain increase. Plan: Continue per plan of care.       Precautions: none      Manuals            Right infraspinatus stm DB DB           Left gluteal stm DB DB                                     Neuro Re-Ed             Bridge band nv No band 2x8           Bridge ball nv nv           Dead bug nv nv           Sit to stand band nv nv                                                  Ther Ex             S/l abd(shld) nv 1# x10 0# x10           S/l flex to H abd nv 1# x10           S/l Er nv 2# 2x10           shld row nv            Supinated shld ext nv            B shld ER nv            Figure 4 str  15''x5           Hip IR s/l  D/c pn           S/l hip abd  2x10                                     Gait Training                                       Modalities

## 2023-08-15 ENCOUNTER — OFFICE VISIT (OUTPATIENT)
Dept: FAMILY MEDICINE CLINIC | Facility: HOSPITAL | Age: 78
End: 2023-08-15
Payer: MEDICARE

## 2023-08-15 ENCOUNTER — APPOINTMENT (OUTPATIENT)
Dept: PHYSICAL THERAPY | Facility: CLINIC | Age: 78
End: 2023-08-15
Payer: MEDICARE

## 2023-08-15 VITALS
HEART RATE: 80 BPM | HEIGHT: 63 IN | SYSTOLIC BLOOD PRESSURE: 106 MMHG | DIASTOLIC BLOOD PRESSURE: 70 MMHG | BODY MASS INDEX: 21.72 KG/M2 | WEIGHT: 122.6 LBS | TEMPERATURE: 97.9 F

## 2023-08-15 DIAGNOSIS — F51.01 PRIMARY INSOMNIA: ICD-10-CM

## 2023-08-15 DIAGNOSIS — K21.9 GASTROESOPHAGEAL REFLUX DISEASE, UNSPECIFIED WHETHER ESOPHAGITIS PRESENT: ICD-10-CM

## 2023-08-15 DIAGNOSIS — D72.819 LEUKOPENIA, UNSPECIFIED TYPE: ICD-10-CM

## 2023-08-15 DIAGNOSIS — M25.552 PAIN OF LEFT HIP: ICD-10-CM

## 2023-08-15 DIAGNOSIS — E87.1 HYPONATREMIA: ICD-10-CM

## 2023-08-15 DIAGNOSIS — I10 PRIMARY HYPERTENSION: Primary | ICD-10-CM

## 2023-08-15 DIAGNOSIS — M25.511 ACUTE PAIN OF RIGHT SHOULDER: ICD-10-CM

## 2023-08-15 DIAGNOSIS — D12.6 ADENOMATOUS POLYP OF COLON, UNSPECIFIED PART OF COLON: ICD-10-CM

## 2023-08-15 DIAGNOSIS — R25.2 BILATERAL LEG CRAMPS: ICD-10-CM

## 2023-08-15 PROCEDURE — 99214 OFFICE O/P EST MOD 30 MIN: CPT | Performed by: INTERNAL MEDICINE

## 2023-08-15 RX ORDER — ZOLPIDEM TARTRATE 5 MG/1
TABLET ORAL
Qty: 15 TABLET | Refills: 0 | Status: CANCELLED | OUTPATIENT
Start: 2023-08-15

## 2023-08-15 RX ORDER — FAMOTIDINE 40 MG/1
40 TABLET, FILM COATED ORAL
Qty: 90 TABLET | Refills: 2 | Status: SHIPPED | OUTPATIENT
Start: 2023-08-15

## 2023-08-15 RX ORDER — METOPROLOL SUCCINATE 25 MG/1
25 TABLET, EXTENDED RELEASE ORAL DAILY
Qty: 90 TABLET | Refills: 2 | Status: SHIPPED | OUTPATIENT
Start: 2023-08-15

## 2023-08-15 NOTE — ASSESSMENT & PLAN NOTE
Well controlled with rare prn Ambien, PDMP Rx website reviewed and no red flag noted rx refilled at pt noting benefit with rx w/o SE, will follow

## 2023-08-15 NOTE — PROGRESS NOTES
Name: Trice López      : 1945      MRN: [id number]  Encounter Provider: Tereso Graff DO  Encounter Date: 8/15/2023   Encounter department: [address]     1. Primary hypertension  Assessment & Plan:  BP at goal, con't current meds, recheck in 6 mos    Orders:  -     metoprolol succinate (TOPROL-XL) 25 mg 24 hr tablet; Take 1 tablet (25 mg total) by mouth daily    2. Adenomatous polyp of colon, unspecified part of colon  Assessment & Plan:  2/3 TA - 5 yr follow up as per GI note      3. Leukopenia, unspecified type  Comments:  Mild and stable, check CBC q 6 mos - BW order given, will follow  Orders:  -     CBC and differential    4. Hyponatremia  Comments:  First time low - check BMP - BW order given, will follow  Orders:  -     Basic metabolic panel; Future    5. Gastroesophageal reflux disease, unspecified whether esophagitis present  Assessment & Plan:  Well controlled on current Pepcid, rx refilled upon request    Orders:  -     famotidine (PEPCID) 40 MG tablet; Take 1 tablet (40 mg total) by mouth daily at bedtime    6. Primary insomnia  Assessment & Plan:  Well controlled with rare prn Ambien, PDMP Rx website reviewed and no red flag noted rx refilled at pt noting benefit with rx w/o SE, will follow      7. Bilateral leg cramps  Comments:  Check BMP/Mg, keep hydrated and regular stretching encouraged, call with persistent/new/worse symptoms  Orders:  -     Magnesium; Future    8. Pain of left hip  Comments:  Bursitis vs arthritis - having some benefit with PT - con't PT and ice/Tylenol prn, if no better in 4 wks call and will start with Xray +/- Ortho referral    9.  Acute pain of right shoulder  Comments:  Tendonitis vs arthritis vs bursitis having some benefit w/PT-con't PT and ice/Tylenol prn, if no better in 4 wks call and will start w/ Xray +/- Ortho referral      Colonoscopy  - 5 yrs    Mammo     Dexa  - osteopenia    BW 4/23      Subjective      HPI Pt here for follow up appt    BP at goal today and meds were reviewed and no changes have occurred. She denies missing doses of meds or SE with the meds. She does not check her BP outside the office regularly but when she does "it is always good". She notes no frequent Ha's/dizziness/double vision/CP. Pt had her colonoscopy in April. 2/3 polyps were TA. A 5 yr follow up was recommended. Recommendations reviewed with pt. BW results from April 23 reviewed - Na a bit low and WBC still mildly decreased. She notes no recent infections other then cellulitis of nares. She con't to use Ambien as needed for insomnia. When she uses the rx she breaks it in half to 1/3 and only uses it once every 2-3 wks. She notes no SE/sedation/confusion. She has benefit when she uses the rx. Pt with L hip and r shoulder pain for about a month. She had no specific fall/trauma/injury. She has no LE numbness/tingling/weakness. She has been in PT for about 2 wks with some benefit (mild). She is not using anything OTC. She has had some leg cramps at night. She is trying to walk again      Review of Systems   Constitutional: Negative for chills, fever and unexpected weight change. HENT: Negative for congestion and trouble swallowing. Eyes: Negative for pain and visual disturbance. Respiratory: Negative for cough and shortness of breath. Cardiovascular: Negative for chest pain, palpitations and leg swelling. Gastrointestinal: Negative for abdominal pain, blood in stool, constipation, diarrhea, nausea and vomiting. Genitourinary: Negative for difficulty urinating and dysuria. Musculoskeletal: Positive for arthralgias. Negative for back pain, gait problem and neck pain. Skin: Negative for rash and wound. Neurological: Negative for dizziness, weakness, light-headedness, numbness and headaches. Hematological: Does not bruise/bleed easily.    Psychiatric/Behavioral: Negative for confusion and dysphoric mood. Current Outpatient Medications on File Prior to Visit   Medication Sig   • betamethasone, augmented, (DIPROLENE) 0.05 % ointment APPLY TO RASH ON UPPER/ LOWER EXTREMITIES TWICE DAILY X10-14 DAYS (DO NOT USE ON FACE OR BODY FOLDS)   • cetirizine (ZyrTEC) 5 MG tablet Take 2.5 mg by mouth daily 1/2 tablet   • estradiol (Yuvafem) 10 MCG TABS vaginal tablet Insert 1 tablet (10 mcg total) into the vagina 2 (two) times a week At bedtime   • latanoprost (XALATAN) 0.005 % ophthalmic solution INSTILL 1 DROP INTO BOTH EYES AT BEDTIME   • zolpidem (AMBIEN) 5 mg tablet Take . 5-1 tab as needed for sleep   • [DISCONTINUED] famotidine (PEPCID) 40 MG tablet TAKE 1 TABLET BY MOUTH DAILY AT BEDTIME   • [DISCONTINUED] fluticasone (FLONASE) 50 mcg/act nasal spray 1 spray into each nostril daily   • [DISCONTINUED] metoprolol succinate (TOPROL-XL) 25 mg 24 hr tablet TAKE 1 TABLET (25 MG TOTAL) BY MOUTH DAILY. Objective     /70   Pulse 80   Temp 97.9 °F (36.6 °C) (Tympanic)   Ht 5' 3" (1.6 m)   Wt 55.6 kg (122 lb 9.6 oz)   LMP  (LMP Unknown)   BMI 21.72 kg/m²     Physical Exam  Vitals and nursing note reviewed. Constitutional:       General: She is not in acute distress. Appearance: She is well-developed. She is not ill-appearing. HENT:      Head: Normocephalic and atraumatic. Eyes:      General:         Right eye: No discharge. Left eye: No discharge. Conjunctiva/sclera: Conjunctivae normal.   Neck:      Trachea: No tracheal deviation. Cardiovascular:      Rate and Rhythm: Normal rate and regular rhythm. Heart sounds: Normal heart sounds. No murmur heard. No friction rub. Pulmonary:      Effort: Pulmonary effort is normal. No respiratory distress. Breath sounds: Normal breath sounds. No wheezing, rhonchi or rales. Abdominal:      General: There is no distension. Palpations: Abdomen is soft. Tenderness:  There is no abdominal tenderness. There is no guarding or rebound. Musculoskeletal:         General: No swelling, deformity or signs of injury. Cervical back: Neck supple. Comments: R shoulder: + crepitus with ROM, nml AROM in all fields, neg drop arm test  L hip: no pain with flexion, internal rotation/external rotation   Skin:     General: Skin is warm. Coloration: Skin is not pale. Findings: No bruising or rash. Neurological:      General: No focal deficit present. Mental Status: She is alert. Sensory: No sensory deficit. Motor: No weakness or abnormal muscle tone. Gait: Gait normal.   Psychiatric:         Mood and Affect: Mood normal.         Behavior: Behavior normal.         Thought Content:  Thought content normal.         Judgment: Judgment normal.       Estee Howell, DO

## 2023-08-16 DIAGNOSIS — F51.01 PRIMARY INSOMNIA: ICD-10-CM

## 2023-08-16 RX ORDER — ZOLPIDEM TARTRATE 5 MG/1
TABLET ORAL
Qty: 15 TABLET | Refills: 0 | Status: SHIPPED | OUTPATIENT
Start: 2023-08-16

## 2023-08-22 ENCOUNTER — APPOINTMENT (OUTPATIENT)
Dept: PHYSICAL THERAPY | Facility: CLINIC | Age: 78
End: 2023-08-22
Payer: MEDICARE

## 2023-08-29 ENCOUNTER — APPOINTMENT (OUTPATIENT)
Dept: LAB | Facility: CLINIC | Age: 78
End: 2023-08-29
Payer: MEDICARE

## 2023-08-29 ENCOUNTER — APPOINTMENT (OUTPATIENT)
Dept: PHYSICAL THERAPY | Facility: CLINIC | Age: 78
End: 2023-08-29
Payer: MEDICARE

## 2023-08-29 DIAGNOSIS — E87.1 HYPONATREMIA: ICD-10-CM

## 2023-08-29 DIAGNOSIS — R25.2 BILATERAL LEG CRAMPS: ICD-10-CM

## 2023-08-29 LAB
ANION GAP SERPL CALCULATED.3IONS-SCNC: 9 MMOL/L
BASOPHILS # BLD AUTO: 0.05 THOUSANDS/ÂΜL (ref 0–0.1)
BASOPHILS NFR BLD AUTO: 1 % (ref 0–1)
BUN SERPL-MCNC: 20 MG/DL (ref 5–25)
CALCIUM SERPL-MCNC: 9.3 MG/DL (ref 8.4–10.2)
CHLORIDE SERPL-SCNC: 100 MMOL/L (ref 96–108)
CO2 SERPL-SCNC: 27 MMOL/L (ref 21–32)
CREAT SERPL-MCNC: 0.91 MG/DL (ref 0.6–1.3)
EOSINOPHIL # BLD AUTO: 0.18 THOUSAND/ÂΜL (ref 0–0.61)
EOSINOPHIL NFR BLD AUTO: 4 % (ref 0–6)
ERYTHROCYTE [DISTWIDTH] IN BLOOD BY AUTOMATED COUNT: 12.2 % (ref 11.6–15.1)
GFR SERPL CREATININE-BSD FRML MDRD: 61 ML/MIN/1.73SQ M
GLUCOSE SERPL-MCNC: 82 MG/DL (ref 65–140)
HCT VFR BLD AUTO: 39.8 % (ref 34.8–46.1)
HGB BLD-MCNC: 12.7 G/DL (ref 11.5–15.4)
IMM GRANULOCYTES # BLD AUTO: 0.01 THOUSAND/UL (ref 0–0.2)
IMM GRANULOCYTES NFR BLD AUTO: 0 % (ref 0–2)
LYMPHOCYTES # BLD AUTO: 0.75 THOUSANDS/ÂΜL (ref 0.6–4.47)
LYMPHOCYTES NFR BLD AUTO: 16 % (ref 14–44)
MAGNESIUM SERPL-MCNC: 2.1 MG/DL (ref 1.9–2.7)
MCH RBC QN AUTO: 31.7 PG (ref 26.8–34.3)
MCHC RBC AUTO-ENTMCNC: 31.9 G/DL (ref 31.4–37.4)
MCV RBC AUTO: 99 FL (ref 82–98)
MONOCYTES # BLD AUTO: 0.41 THOUSAND/ÂΜL (ref 0.17–1.22)
MONOCYTES NFR BLD AUTO: 9 % (ref 4–12)
NEUTROPHILS # BLD AUTO: 3.43 THOUSANDS/ÂΜL (ref 1.85–7.62)
NEUTS SEG NFR BLD AUTO: 70 % (ref 43–75)
NRBC BLD AUTO-RTO: 0 /100 WBCS
PLATELET # BLD AUTO: 289 THOUSANDS/UL (ref 149–390)
PMV BLD AUTO: 10.3 FL (ref 8.9–12.7)
POTASSIUM SERPL-SCNC: 4.5 MMOL/L (ref 3.5–5.3)
RBC # BLD AUTO: 4.01 MILLION/UL (ref 3.81–5.12)
SODIUM SERPL-SCNC: 136 MMOL/L (ref 135–147)
WBC # BLD AUTO: 4.83 THOUSAND/UL (ref 4.31–10.16)

## 2023-08-29 PROCEDURE — 80048 BASIC METABOLIC PNL TOTAL CA: CPT

## 2023-08-29 PROCEDURE — 36415 COLL VENOUS BLD VENIPUNCTURE: CPT

## 2023-08-29 PROCEDURE — 83735 ASSAY OF MAGNESIUM: CPT

## 2023-09-07 ENCOUNTER — APPOINTMENT (OUTPATIENT)
Dept: PHYSICAL THERAPY | Facility: CLINIC | Age: 78
End: 2023-09-07
Payer: MEDICARE

## 2023-09-13 ENCOUNTER — TELEPHONE (OUTPATIENT)
Dept: FAMILY MEDICINE CLINIC | Facility: HOSPITAL | Age: 78
End: 2023-09-13

## 2023-09-13 ENCOUNTER — OFFICE VISIT (OUTPATIENT)
Dept: PHYSICAL THERAPY | Facility: CLINIC | Age: 78
End: 2023-09-13
Payer: MEDICARE

## 2023-09-13 DIAGNOSIS — G89.29 CHRONIC RIGHT SHOULDER PAIN: ICD-10-CM

## 2023-09-13 DIAGNOSIS — M25.511 CHRONIC RIGHT SHOULDER PAIN: ICD-10-CM

## 2023-09-13 DIAGNOSIS — M25.552 LEFT HIP PAIN: ICD-10-CM

## 2023-09-13 DIAGNOSIS — M25.552 LEFT HIP PAIN: Primary | ICD-10-CM

## 2023-09-13 DIAGNOSIS — M25.511 RIGHT SHOULDER PAIN, UNSPECIFIED CHRONICITY: Primary | ICD-10-CM

## 2023-09-13 PROCEDURE — 97112 NEUROMUSCULAR REEDUCATION: CPT | Performed by: PHYSICAL THERAPIST

## 2023-09-13 PROCEDURE — 97140 MANUAL THERAPY 1/> REGIONS: CPT | Performed by: PHYSICAL THERAPIST

## 2023-09-13 NOTE — PROGRESS NOTES
Daily Note     Today's date: 2023  Patient name: Rashmi Benton  : 1945  MRN: [id number]  Referring provider: Ken Zuniga, *  Dx:   Encounter Diagnosis     ICD-10-CM    1. Left hip pain  M25.552       2. Chronic right shoulder pain  M25.511     G89.29                      Subjective: pt notes that since last coming to PT she has been increasing her weight for her exericses for the leg and the shoulder but still having a lot of difficulty with reaching fwd/out with her arm and negotiating stairs b/c of the hip. Objective: See treatment diary below      Assessment: pt did have improvement with reaching out / fwd with the arm post manuals as the right subscap was very tight. Pt did continue with left hip pain with even lifting of the leg post manuals though. Did advise in new exercises for both the hip and shoulder with hard copy in chart. Also advised pt to contact ortho at this time as well since pt's symptoms remain relatively constant despite improved strength, rest, and activity modification      Plan: Continue per plan of care.       Precautions: none      Manuals           Right infraspinatus stm DB DB DB          Left gluteal stm DB DB DB                                    Neuro Re-Ed             Bridge band nv No band 2x8           Bridge ball nv nv x10          Dead bug nv nv           Sit to stand band nv nv           Hamstring iso   5''x10                                    Ther Ex             S/l abd(shld) nv 1# x10 0# x10           S/l flex to H abd nv 1# x10           S/l Er nv 2# 2x10           shld row nv            Supinated shld ext nv            B shld ER nv            Figure 4 str  15''x5           Hip IR s/l  D/c pn           S/l hip abd  2x10                                     Gait Training             HEP   DB                       Modalities

## 2023-09-13 NOTE — TELEPHONE ENCOUNTER
Patient has an appt with Dr Adalberto Banerjee (Ortho) next Tuesday. She was wondering if Dr Melanie Peterson would want to put in orders for Xrays of her R Shoulder and L hip? They told her when she made the appt that if orders were in it would help speed things up for her appt with Dr Adalberto Banerjee.      Please call patient to advise

## 2023-09-14 ENCOUNTER — APPOINTMENT (OUTPATIENT)
Dept: RADIOLOGY | Facility: CLINIC | Age: 78
End: 2023-09-14
Payer: MEDICARE

## 2023-09-14 DIAGNOSIS — M25.511 RIGHT SHOULDER PAIN, UNSPECIFIED CHRONICITY: ICD-10-CM

## 2023-09-14 DIAGNOSIS — M25.552 LEFT HIP PAIN: ICD-10-CM

## 2023-09-14 PROCEDURE — 73030 X-RAY EXAM OF SHOULDER: CPT

## 2023-09-14 PROCEDURE — 73502 X-RAY EXAM HIP UNI 2-3 VIEWS: CPT

## 2023-09-19 ENCOUNTER — OFFICE VISIT (OUTPATIENT)
Dept: OBGYN CLINIC | Facility: CLINIC | Age: 78
End: 2023-09-19
Payer: MEDICARE

## 2023-09-19 VITALS
WEIGHT: 122.9 LBS | BODY MASS INDEX: 21.78 KG/M2 | DIASTOLIC BLOOD PRESSURE: 76 MMHG | HEART RATE: 73 BPM | SYSTOLIC BLOOD PRESSURE: 124 MMHG | HEIGHT: 63 IN

## 2023-09-19 DIAGNOSIS — M79.18 PIRIFORMIS MUSCLE PAIN: ICD-10-CM

## 2023-09-19 DIAGNOSIS — M75.21 BICEPS TENDINITIS OF RIGHT UPPER EXTREMITY: Primary | ICD-10-CM

## 2023-09-19 DIAGNOSIS — M53.3 SACROILIAC PAIN: ICD-10-CM

## 2023-09-19 PROCEDURE — 99204 OFFICE O/P NEW MOD 45 MIN: CPT | Performed by: STUDENT IN AN ORGANIZED HEALTH CARE EDUCATION/TRAINING PROGRAM

## 2023-09-19 NOTE — PROGRESS NOTES
Ortho Sports Medicine Shoulder New Patient Visit     Assesment:   70 y.o. female right shoulder biceps tendinitis and left hip sacroiliac and piriformis pain. Plan:    A discussion was had with the patient that her shoulder pain is likely due to biceps tendinitis. Since she is already doing therapy, our next recommendation would be an injection. However, we discussed that this procedure is best done under US guidance to avoid increased risk of tendon rupture. A referral was made to Sports medicine to have this procedure done on a first available basis. She can send us a message if she has trouble getting scheduled. We did also discuss her X-ray findings. The cystic change is most likely due to arthritis. Her hip pain is likely not from the joint and not from the greater trochanteric bursa. She seems to have pinpoint tenderness over her sacroiliac joint and over her piriformis. We discussed that my colleague, Dr. Luan Feldman, does US guided percutaneous injections for these regions. She was referred for discussion of these injections. Conservative treatment:    PT for ROM and strengthening to shoulder, rotator cuff, scapular stabilizers. Imaging: All imaging from today was reviewed by myself and explained to the patient. Injection:    No Injection planned at this time. Please see plan for discussion of planned future injections. Surgery:     No surgery is recommended at this point, continue with conservative treatment plan as noted. Follow up:    PRN. Chief Complaint   Patient presents with   • Right Shoulder - Pain       History of Present Illness: The patient is a 70 y.o., right hand dominant female whose occupation is retired, referred to me by their primary care physician, seen in clinic for consultation of right shoulder and left hip pain. Both have been bothering her for 4-6 months without any known inciting injury.   The shoulder pain is located over the anterior aspect of the shoulder with movement and when she sleeps, but she reports pinpoint tenderness over the posterior aspect of the shoulder. She has trouble lifting her right arm to  her steering wheel. The hip pain is located in her buttocks and increases with going up stairs. She reports no radiation of symptoms down her leg. She takes Tylenol and Aleve occasionally for pain. She has tried about 6 weeks of PT/OT which has helped the shoulder, but not with the hip. She denies any new activities. She does yoga and [employer], but has done these for years. 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     2/13/13 - Hemorrhoids, repeat 5 years d/t family h/o colon cancer   • FOOT SURGERY      mortons neuroma   • MOUTH SURGERY      Tooth Extraction - last assessed: Aug 19, 2014   • MOUTH SURGERY N/A 11/2017   • FL AMPUTATION TOE METATARSOPHALANGEAL JOINT Right 10/28/2022    Procedure: AMPUTATION TOE Right Second;  Surgeon: Eliud Vargas DPM;  Location: [address] [place] [address];  Service: Podiatry   • SKIN BIOPSY Right 07/2018    right thigh   • TONSILLECTOMY      last assessed: Aug 19, 2014     No Known Allergies  Current Outpatient Medications on File Prior to Visit   Medication Sig Dispense Refill   • betamethasone, augmented, (DIPROLENE) 0.05 % ointment APPLY TO RASH ON UPPER/ LOWER EXTREMITIES TWICE DAILY X10-14 DAYS (DO NOT USE ON FACE OR BODY FOLDS)  1   • cetirizine (ZyrTEC) 5 MG tablet Take 2.5 mg by mouth daily 1/2 tablet     • estradiol (Yuvafem) 10 MCG TABS vaginal tablet Insert 1 tablet (10 mcg total) into the vagina 2 (two) times a week At bedtime 24 tablet 3   • famotidine (PEPCID) 40 MG tablet Take 1 tablet (40 mg total) by mouth daily at bedtime 90 tablet 2   • latanoprost (XALATAN) 0.005 % ophthalmic solution INSTILL 1 DROP INTO BOTH EYES AT BEDTIME     • metoprolol succinate (TOPROL-XL) 25 mg 24 hr tablet Take 1 tablet (25 mg total) by mouth daily 90 tablet 2   • zolpidem (AMBIEN) 5 mg tablet TAKE 1/2 TO 1 TABLET BY MOUTH DAILY AS NEEDED FOR SLEEP 15 tablet 0     No current facility-administered medications on file prior to visit. Social History     Socioeconomic History   • Marital status: /Civil Union     Spouse name: Not on file   • Number of children: 2   • Years of education: Not on file   • Highest education level: Not on file   Occupational History   • Not on file   Tobacco Use   • Smoking status: Never   • Smokeless tobacco: Never   Vaping Use   • Vaping Use: Never used   Substance and Sexual Activity   • Alcohol use: Yes     Alcohol/week: 2.0 - 4.0 standard drinks of alcohol     Types: 2 - 4 Glasses of wine per week     Comment: 1 per day - social/ drinks wine    • Drug use: No   • Sexual activity: Yes     Partners: Male   Other Topics Concern   • Not on file   Social History Narrative    Always uses seat belt     Caffeine use     [facility]      Social Determinants of Health     Financial Resource Strain: Low Risk  (2/23/2023)    Overall Financial Resource Strain (CARDIA)    • Difficulty of Paying Living Expenses: Not hard at all   Food Insecurity: Not on file   Transportation Needs: No Transportation Needs (2/23/2023)    PRAPARE - Transportation    • Lack of Transportation (Medical): No    • Lack of Transportation (Non-Medical):  No   Physical Activity: Not on file   Stress: Not on file   Social Connections: Not on file   Intimate Partner Violence: Not on file   Housing Stability: Not on file         I have reviewed the past medical, surgical, social and family history, medications and allergies as documented in the EMR. Review of systems: ROS is negative other than that noted in the HPI. Constitutional: Negative for fatigue and fever. HENT: Negative for sore throat. Respiratory: Negative for shortness of breath. Cardiovascular: Negative for chest pain. Gastrointestinal: Negative for abdominal pain. Endocrine: Negative for cold intolerance and heat intolerance. Genitourinary: Negative for flank pain. Musculoskeletal: Negative for back pain. Skin: Negative for rash. Allergic/Immunologic: Negative for immunocompromised state. Neurological: Negative for dizziness. Psychiatric/Behavioral: Negative for agitation. Physical Exam:    Blood pressure 124/76, pulse 73, height 5' 3" (1.6 m), weight 55.7 kg (122 lb 14.4 oz), not currently breastfeeding. General/Constitutional: NAD, well developed, well nourished  HENT: Normocephalic, atraumatic  CV: Intact distal pulses, regular rate  Resp: No respiratory distress or labored breathing  Lymphatic: No lymphadenopathy palpated  Neuro: Alert and Oriented x 3, no focal deficits  Psych: Normal mood, normal affect, normal judgement, normal behavior  Skin: Warm, dry, no rashes, no erythema      Shoulder focused exam:     Tender with palpation of the biceps tendon    Visual inspection of the shoulder demonstrates normal contour without atrophy. No evidence of scapular dyskinesia or atrophy. No scapular winging  No open wounds. No erythema or ecchymosis.   Active and passive range of motion demonstrates forward flexion to 180, abduction to 180, external rotation is approximately 90 with the arm in 90° of abduction, external rotation is 90 with the arm the side, internal rotation to approximately T8 Rotator cuff strength is 5/5 to resisted forward flexion, 5/5 resisted abduction, 5/5 resisted external rotation, 5/5 resisted internal rotation  No tenderness to palpation at the distal clavicle, AC joint, acromion, or scapular spine  No pain with cross-body adduction  Negative Neer's test, negative modified Frey impingement test  Negative external rotation lag sign  Negative belly press, negative lift-off  Positive speed's and Yergason's test  Positive tenderness to palpation at the bicipital groove  + Speed, - Dimmit's test    UE NV Exam: +2 Radial pulses bilaterally  Sensation intact to light touch C5-T1 bilaterally, Radial/median/ulnar nerve motor intact      Bilateral elbow, wrist, and and forearm ROM full, painless with passive ROM, no ttp or crepitance throughout extremities below shoulder joint    Cervical ROM is full without pain, numbness or tingling       Hip Exam (focused):    left hip:     No dislocation/deformity  ROM: 120 degrees of hip flexion; 60 degrees of ER, 50 degrees of IR. Greater troch: non-tender   SI joint: tender  Piriformis: tender  Abduction: 5/5  AT/GS intact  Benavides's Test: negative  Mateusz Test: negative    Back:    No TTP over lumbar spinous processes, paraspinal musculature  Negative SLR     No calf tenderness to palpation bilaterally    LE NV Exam: +2 DP/PT pulses bilaterally  Sensation intact to light touch L2-S1 bilaterally      Shoulder Imaging    X-rays of the right shoulder were reviewed, which demonstrate no significant osteoarthritis in the glenohumeral joint. There is a cystic change in the humerus possibly indicative of arthritic changes vs rotator cuff injury vs malignancy. I have reviewed the radiology report and agree with their impression. X-rays of the right hip were reviewed, which demonstrate no significant osteoarthritis. I have reviewed the radiology report and agree with their impression.       Scribe Attestation    I,:  Rush Grover PA-C am acting as a scribe while in the presence of the attending physician.:       I,:  Juany Mean, DO personally performed the services described in this documentation    as scribed in my presence.:

## 2023-09-22 NOTE — ASSESSMENT & PLAN NOTE
"[address] [address]  Phone: [phone]  Primary Provider: Santiago Guevara  Pre-op Performing Provider: SANTIAGO GUEVARA      PREOPERATIVE EVALUATION:  Today's date: 9/22/2023    Arturo is a 57 year old male who presents for a preoperative evaluation.      9/22/2023     2:16 PM   Additional Questions   Roomed by Argenis TO       Surgical Information:  Surgery/Procedure: CHOLECYSTECTOMY, ROBOT-ASSISTED, LAPAROSCOPIC, USING DA AAYUSH XI, possible open   Surgery Location: [facility]  Surgeon: Dr. Wu  Surgery Date: 10-  Time of Surgery: 9:30 am  Where patient plans to recover: At home with family  Fax number for surgical facility: Note does not need to be faxed, will be available electronically in Epic.    Assessment & Plan     The proposed surgical procedure is considered INTERMEDIATE risk.    Pre-op exam    - Comprehensive metabolic panel (BMP + Alb, Alk Phos, ALT, AST, Total. Bili, TP); Future  - CBC with platelets; Future  - INR; Future  - UA Macroscopic with reflex to Microscopic and Culture - Lab Collect; Future  - Comprehensive metabolic panel (BMP + Alb, Alk Phos, ALT, AST, Total. Bili, TP)  - CBC with platelets  - INR  - UA Macroscopic with reflex to Microscopic and Culture - Lab Collect    Cholecystitis, chronic  Requires surgical excision.  See for surgical notes for details.    Steroid-dependent COPD (H)  Patient started to increase his basal prednisone dosage to 20 mg 2 days prior to procedure for \"stress dosing\".        Based on the patient's severe respiratory compromise and inability to maintain normal activities of daily living due to shortness of breath with any exertion, I believe the patient should have a electric scooter for ambulation more than a few feet.  He is not capable of walking more than about 30 feet total which prohibits him from shopping, going to the doctor's office " Well controlled on daily olmesartan  Maintain f/u with Dr Eleni Scheuermann as planned etc.  Dear insurance company:      Please consider helping the patient financially with regards to obtaining a motorized scooter that he may use for the above-noted reasons.  Thank you.        Pulmonary hypertension (H)  Secondary to COPD    Paroxysmal atrial fibrillation (H)  Secondary to pulmonary hypertension    Coronary artery disease involving native coronary artery of native heart without angina pectoris  Stable.  No recent chest pain or evidence of inducible ischemia  Recent angiography demonstrates no critical stenosis    History of cardiomyopathy  Echocardiography demonstrates ejection fraction of approximately 40%.    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 HOLD 1 day before surgery.      Additional Medication Instructions:  Patient will increase his prednisone to 20 mg for 2 days prior to the procedure.    RECOMMENDATION:  APPROVAL GIVEN to proceed with proposed procedure, without further diagnostic evaluation.            Subjective       HPI related to upcoming procedure: CHOLECYSTECTOMY, ROBOT-ASSISTED, LAPAROSCOPIC, USING DA AAYUSH XI, possible open         9/15/2023     8:53 AM   Preop Questions   1. Have you ever had a heart attack or stroke? YES - stroke and heart attack   2. Have you ever had surgery on your heart or blood vessels, such as a stent placement, a coronary artery bypass, or surgery on an artery in your head, neck, heart, or legs? No   3. Do you have chest pain with activity? No   4. Do you have a history of  heart failure? UNKNOWN - no   5. Do you currently have a cold, bronchitis or symptoms of other infection? No   6. Do you have a cough, shortness of  breath, or wheezing? YES - SOB, wheezing   7. Do you or anyone in your family have previous history of blood clots? No   8. Do you or does anyone in your family have a serious bleeding problem such as prolonged bleeding following surgeries or cuts? UNKNOWN - no   9. Have you ever had problems with anemia or been told to take iron pills? No   10. Have you had any abnormal blood loss such as black, tarry or bloody stools? No   11. Have you ever had a blood transfusion? No   12. Are you willing to have a blood transfusion if it is medically needed before, during, or after your surgery? Yes   13. Have you or any of your relatives ever had problems with anesthesia? No   14. Do you have sleep apnea, excessive snoring or daytime drowsiness? No   15. Do you have any artifical heart valves or other implanted medical devices like a pacemaker, defibrillator, or continuous glucose monitor? No   16. Do you have artificial joints? YES - right shoulder   17. Are you allergic to latex? No       Health Care Directive:  Patient does not have a Health Care Directive or Living Will: Discussed advance care planning with patient; however, patient declined at this time.    Preoperative Review of :   reviewed - controlled substances reflected in medication list.          Review of Systems  CONSTITUTIONAL: NEGATIVE for fever, chills, change in weight  INTEGUMENTARY/SKIN: NEGATIVE for worrisome rashes, moles or lesions  EYES: NEGATIVE for vision changes or irritation  ENT/MOUTH: NEGATIVE for ear, mouth and throat problems  RESP: Positive for pulmonary hypertension.  CV: NEGATIVE for chest pain, palpitations or peripheral edema  GI: Persistent epigastric pain is modest.  Episodic discomfort associated with gallbladder disease is noted.  Denies diarrhea or other similar findings  : NEGATIVE for frequency, dysuria, or hematuria  MUSCULOSKELETAL: Has chronic shoulder pain as result of his avascular necrosis.  NEURO: NEGATIVE for  weakness, dizziness or paresthesias  ENDOCRINE: NEGATIVE for temperature intolerance, skin/hair changes  HEME: NEGATIVE for bleeding problems  PSYCHIATRIC: NEGATIVE for changes in mood or affect    Patient Active Problem List    Diagnosis Date Noted    History of hemiarthroplasty of right shoulder 08/25/2023     Priority: Medium    Chronic right shoulder pain 08/25/2023     Priority: Medium    Dyspnea on exertion 04/21/2023     Priority: Medium    Acute on chronic respiratory failure with hypoxia (H) 04/21/2023     Priority: Medium    Chronic obstructive pulmonary disease, unspecified COPD type (H) 04/21/2023     Priority: Medium    Chest pain, unspecified type 04/21/2023     Priority: Medium    Chronic obstructive pulmonary disease on long-term oral steroid therapy (H) 04/21/2023     Priority: Medium    COPD exacerbation (H) 04/14/2023     Priority: Medium    Abnormal chest CT 07/19/2022     Priority: Medium     CT 6/2022- Two spiculated nodular opacity in the left upper lobe measuring 2.4 x 0.8 cm and in the right upper lobe measuring 0.9 cm, these are indeterminant, could be neoplastic or less likely infectious.       Chronic respiratory failure with hypoxia and hypercapnia (H) 07/19/2022     Priority: Medium     ABG 10/2021- 7.43/76/50      Hypothyroidism 07/18/2022     Priority: Medium    Mitral regurgitation 07/18/2022     Priority: Medium     Moderate by echo 2021, MRI 2022      Generalized muscle weakness 10/06/2021     Priority: Medium    Paroxysmal atrial fibrillation (H) 10/05/2021     Priority: Medium     EP study no inducible SVT with atrial pacing. Ventricular extrastimulation by using triple ventricular extrastimuli did not induce VT. Near the end of the procedure the right atrial catheter induced an episode of nonsustained atrial fibrillation. During atrial fibrillation the patient had intermittent rapid ventricular rate with wide QRS complex that was consistent with right bundle-branch block with a  bizarre QRS morphology. The QRS morphology of the tachycardia  during atrial fibrillation was almost identical to that of the clinical tachycardia, indicating that the patient's clinical tachycardia was atrial fibrillation with right bundle-branch block        History of cardiomyopathy 10/05/2021     Priority: Medium     HFrEF secondary to possible alcoholic cardiomyopathy.  Echo 10/2021- The left ventricle is normal in size. Moderate global hypokinesia of the left ventricle. The visual ejection fraction is 35-40%. The right ventricle is normal in structure, function and size. There is moderate (2+) mitral regurgitation. There is trace tricuspid regurgitation.  Cardiac MRI 10/2021 - The LV is mildly dilated. The global systolic function is low normal. The LVEF is 55%. There are no regional wall motion abnormalities. RV is normal in cavity size. The global systolic function is normal. The RVEF is 63%.  There is mild to moderate bi-atrial enlargement. There is moderate to severe mitral regurgitation. Moderate tricuspid regurgitation is noted. Late gadolinium enhancement imaging shows no MI, fibrosis or infiltrative disease.  Stress perfusion imaging shows no ischemia. Moderate tricuspid regurgitation.    Coronary angiogram on 02/02/2022 -  nonobstructive mild coronary artery disease. Mild to moderate ostial left main lesion with a 30% stenosis.  LAD had a mid stenosis of 30% and a 40% side branch stenosis in the second diagonal.  Mid circumflex had a 20% stenosis and RCA vessel was small without disease.      Pulmonary hypertension (H)-mild-mod by Echo 10/05/2021     Priority: Medium     Echo 9/2021- There is moderate (2+) tricuspid regurgitation. The right ventricular systolic pressure is approximated at 37.0 mmHg plus the right atrial pressure. Right ventricular systolic pressure is elevated, consistent with mild to moderate pulmonary hypertension. IVC diameter >2.1 cm collapsing <50% with sniff suggests a high RA  pressure estimated at 15 mmHg or greater.  Echo 10/2021 -The tricuspid valve is normal in structure and function. There is tracetricuspid regurgitation. Right ventricular systolic pressure could not be approximated due to inadequate tricuspid regurgitation. IVC diameter <2.1 cm collapsing >50% with sniff suggests a normal RA pressure of 3 mmHg.      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) 08/10/2021     Priority: Medium    Sinus congestion 12/30/2016     Priority: Medium    Pain management contract signed, martin 6/9/16 06/09/2016     Priority: Medium    Arthralgia of right acromioclavicular joint 06/07/2016     Priority: Medium    Chronic obstructive lung disease  05/23/2016     Priority: Medium     PFTS 10/2019 - FEV1- 0.68 (19%), ratio 0.42, 52% change with bronchodilators,  %, %, DLCO 53%   Home O2 2lpm      Biceps tendonitis, right 01/26/2016     Priority: Medium    History of alcohol abuse 09/08/2015     Priority: Medium     Stopped ?[address] [place] [address] 11/04/2014     Priority: Medium       Status:  Accepted  Care Coordinator:   SHANNON Reilly     SW: Carmelita Cruz/ or covering SW for [facility]    See Letters for [facility] Care Plan  Date:  June 2, 2015        JOAN (obstructive sleep apnea)- mild (AHI 5) 09/02/2013     Priority: Medium     [facility] Diagnostic Sleep Study 2019 (171.0 lbs)  - Apnea/hypopnea index was 5.4 events per hour (central apnea/hypopnea index was 0 events per hour). The REM AHI was 23.9 events per hour. The supine (31 minutes) AHI was 0 events per hour. RDI was 21.4 events per hour. Significant hypoventilation was not suggested by Transcutaneous CO2 Monitoring (TCM) with CO2 running around 50 mmHg visually on report.  Lowest oxygen saturation was 80.7%. Time spent less than or equal to 88% was 1.3 minutes. Time spent less than or equal to 89% was 2.7 minutes.        Advanced directives, counseling/discussion 11/26/2012     Priority: Medium      Discussed advance care planning with patient; however, patient declined at this time. 11/26/2012         Memory loss 08/30/2010     Priority: Medium    BPH (benign prostatic hyperplasia) 07/18/2022     Priority: Low    Moderate major depression (H)      Priority: Low    Anxiety 08/30/2011     Priority: Low    Restless legs syndrome (RLS) 07/23/2010     Priority: Low      Past Medical History:   Diagnosis Date    Acute on chronic respiratory failure with hypoxia (H) 09/09/2015    Agitation 09/28/2021    Alcohol abuse, unspecified     sober since     Arthritis 05/16/2019    Asthma     as a child    Chest wall pain 10/07/2015    Community acquired bacterial pneumonia 04/19/2022    COPD (chronic obstructive pulmonary disease) (H)     COPD exacerbation 09/08/2015    Depressive disorder     Esophageal reflux     Healthcare-associated pneumonia-new RLL infiltrate 10/6 with fever/?sepsis 10/7 03/14/2016    Hypothyroidism     Mitral regurgitation     moderate to severe    Neutrophilic leukocytosis 10/02/2012    Oropharyngeal candidiasis-visualized during intubation 10/6 10/07/2021    Other convulsions     Seizure     Other, mixed, or unspecified nondependent drug abuse, unspecified     Paroxysmal ventricular tachycardia (H) 09/24/2021    History of wide complex tachycardia, possible VT found during hospitalization 09/24/2021    Pneumonia due to infectious organism, negative cultures, but gram stain with gram positive cocci 11/04/2016    Pneumonia, organism unspecified(486) 02/01/2016    RSV infection 09/26/2021    SIRS (systemic inflammatory response syndrome) (H)-POA, new fever with concern for possible sepsis 10/7 09/25/2021    Sleep apnea     Status post coronary angiogram 02/02/2022    Status post rotator cuff surgery 3/2/2016 left 03/14/2016    Streptococcus pneumoniae pneumonia (H) 09/10/2015    Thrombocytopenia (H) 09/28/2021     Past Surgical History:   Procedure Laterality Date    ARTHROPLASTY SHOULDER Right  5/15/2019    Procedure: Hemiarthroplasty Of Right Shoulder, Distal Clavicle Excision;  Surgeon: Cheo Antony MD;  Location: [facility]    ARTHROSCOPY SHOULDER SUPERIOR LABRUM ANTERIOR TO POSTERIOR REPAIR Right 3/2/2016    Procedure: ARTHROSCOPY SHOULDER SUPERIOR LABRUM ANTERIOR TO POSTERIOR REPAIR;  Surgeon: Sacha Maharaj MD;  Location: [facility] OR    ARTHROSCOPY SHOULDER, OPEN BICEP TENODESIS REPAIR, COMBINED Right 3/2/2016    Procedure: COMBINED ARTHROSCOPY SHOULDER, OPEN BICEP TENODESIS REPAIR;  Surgeon: Sacha Maharaj MD;  Location: [facility] OR    COLONOSCOPY N/A 2/9/2018    Procedure: COMBINED COLONOSCOPY, SINGLE OR MULTIPLE BIOPSY/POLYPECTOMY BY BIOPSY;  colonoscopy with polypectomy via forcep;  Surgeon: Anthony Gonzalez MD;  Location:  GI    CV CORONARY ANGIOGRAM N/A 2/2/2022    Procedure: Coronary Angiogram;  Surgeon: Alek Smith MD;  Location: [facility] CARDIAC CATH LAB    CV CORONARY ANGIOGRAM N/A 4/24/2023    Procedure: Coronary Angiogram;  Surgeon: Artie Jamil MD;  Location: [facility] CARDIAC CATH LAB    CV INTRAVASULAR ULTRASOUND N/A 2/2/2022    Procedure: Intravascular Ultrasound;  Surgeon: Alek Smith MD;  Location: [facility] CARDIAC CATH LAB    CV PCI N/A 4/24/2023    Procedure: Percutaneous Coronary Intervention;  Surgeon: Artie Jamil MD;  Location: [facility] CARDIAC CATH LAB    EP COMPREHENSIVE EP STUDY N/A 2/23/2022    Procedure: EP Comprehensive EP Study;  Surgeon: Cameron Morgan MD;  Location: [facility] CARDIAC CATH LAB    ESOPHAGOSCOPY, GASTROSCOPY, DUODENOSCOPY (EGD), COMBINED N/A 8/2/2023    Procedure: Esophagoscopy with biopsy;  Surgeon: Rajeev Matson MD;  Location: [facility] GI    INJECT NERVE BLOCK SUPRASCAPULAR Right 8/30/2023    Procedure: right shoulder nerve blocks;  Surgeon: Rajeev Rankin MD;  Location: [facility] OR    TRANSESOPHAGEAL ECHOCARDIOGRAM INTRAOPERATIVE N/A 8/9/2023    Procedure: Transesophageal echocardiogram intraoperative;   Surgeon: GENERIC ANESTHESIA PROVIDER;  Location: [facility] OR     Current Outpatient Medications   Medication Sig Dispense Refill    albuterol (PROVENTIL) (2.5 MG/3ML) 0.083% neb solution NEBULIZE CONTENTS OF ONE VIAL FOUR TIMES A  mL 1    apixaban ANTICOAGULANT (ELIQUIS) 5 MG tablet Take 1 tablet (5 mg) by mouth 2 times daily 180 tablet 3    atorvastatin (LIPITOR) 10 MG tablet TAKE ONE TABLET (10MG) BY MOUTH DAILY 90 tablet 0    [START ON 10/24/2023] benralizumab (FASENRA) 30 MG/ML SOAJ auto-injector pen Inject 1 mL (30 mg) Subcutaneous every 2 months for 6 doses 1 mL 5    CALCIUM PO Take 1 tablet by mouth daily      fluticasone (FLONASE) 50 MCG/ACT nasal spray Spray 1 spray into both nostrils daily 16 g 3    furosemide (LASIX) 20 MG tablet Take 1 tablet (20 mg) by mouth daily 30 tablet 1    guaiFENesin-dextromethorphan (ROBITUSSIN DM) 100-10 MG/5ML syrup Take 10 mLs by mouth every 4 hours as needed for cough      ipratropium - albuterol 0.5 mg/2.5 mg/3 mL (DUONEB) 0.5-2.5 (3) MG/3ML neb solution NEBULIZE CONTENTS OF ONE VIAL EVERY 6 HOURS AS NEEDED FOR SHORTNESS OF BREATH / DYSPNEA  OR WHEEZING 180 mL 0    levETIRAcetam (KEPPRA) 500 MG tablet Take 500 mg by mouth daily 90 tablet 0    levothyroxine (SYNTHROID/LEVOTHROID) 75 MCG tablet Take 1 tablet (75 mcg) by mouth daily 90 tablet 3    LORazepam (ATIVAN) 1 MG tablet Take 1 tablet (1 mg) by mouth every 8 hours as needed for anxiety 30 tablet 0    metoprolol succinate ER (TOPROL XL) 50 MG 24 hr tablet Take 1.5 tablets (75 mg) by mouth daily 135 tablet 3    mometasone-formoterol (DULERA) 200-5 MCG/ACT inhaler Inhale 2 puffs into the lungs 2 times daily 39 g 1    montelukast (SINGULAIR) 10 MG tablet TAKE 1 TABLET (10 MG) BY MOUTH AT BEDTIME 90 tablet 2    Multiple Vitamins-Minerals (MENS MULTIVITAMIN) TABS Take 1 tablet by mouth daily      OTHER MEDICAL SUPPLIES Oxygen 2 L during the day and 2 L at night with BiPap      pramipexole (MIRAPEX) 0.5 MG tablet TAKE 1  "TABLET (0.5 MG) BY MOUTH AT BEDTIME 90 tablet 1    predniSONE (DELTASONE) 5 MG tablet TAKE 4 TABLETS BY MOUTH AT BEDTIME AFTER COMPLETING PREDNISONE 60MG DAILY (Patient taking differently: Take 10 mg by mouth daily) 120 tablet 1    sacubitril-valsartan (ENTRESTO) 24-26 MG per tablet Take 1/2 pill twice a day. 60 tablet 11    tamsulosin (FLOMAX) 0.4 MG capsule TAKE 1 CAPSULE (0.4 MG) BY MOUTH DAILY 90 capsule 2    VENTOLIN  (90 Base) MCG/ACT inhaler INHALE 2 PUFFS INTO THE LUNGS EVERY 6 HOURS AS NEEDED FOR SHORTNESS OF BREATH OR WHEEZING 18 g 3    VITAMIN D, CHOLECALCIFEROL, PO Take 5,000 Units by mouth every morning       Air  (VICKS AIR PURIFIER/HEPA) (Device) MISC Use as directed. 1 each 3    Air  (VICKS AIR PURIFIER/HEPA) (Device) MISC Use air purifier in home 24 hours a day (Patient not taking: Reported on 2023) 1 each 0       Allergies   Allergen Reactions    Bee Venom     No Known Drug Allergy         Social History     Tobacco Use    Smoking status: Former     Packs/day: 0.00     Years: 31.00     Pack years: 0.00     Types: Cigarettes, Cigars     Quit date: 2016     Years since quittin.8     Passive exposure: Never    Smokeless tobacco: Never   Substance Use Topics    Alcohol use: Yes     Comment: 3-4 drinks 2x per month     Family History   Problem Relation Age of Onset    Lung Cancer Father         lung    Chronic Obstructive Pulmonary Disease Paternal Grandfather     Diabetes No family hx of     Anesthesia Reaction No family hx of     Venous thrombosis No family hx of      History   Drug Use No         Objective     /78 (BP Location: Right arm, Patient Position: Chair)   Pulse 76   Temp 97.1  F (36.2  C) (Temporal)   Resp 20   Ht 1.628 m (5' 4.1\")   Wt 71.2 kg (156 lb 14.4 oz)   SpO2 97%   BMI 26.85 kg/m      Physical Exam    GENERAL APPEARANCE: healthy, alert and no distress     EYES: EOMI,  PERRL     HENT: ear canals and TM's normal and nose and mouth " without ulcers or lesions     NECK: no adenopathy, no asymmetry, masses, or scars and thyroid normal to palpation     RESP: lungs demonstrate scant wheezing to auscultation - n sounds are markedly decreased in all fields.     CV: regular rates and rhythm, normal S1 S2, no S3 or S4 and no murmur, click or rub     ABDOMEN:  soft, nontender, no HSM or masses and bowel sounds normal     MS: extremities normal- no gross deformities noted, no evidence of inflammation in joints, FROM in all extremities.     SKIN: no suspicious lesions or rashes     NEURO: Normal strength and tone, sensory exam grossly normal, mentation intact and speech normal     PSYCH: mentation appears normal. and affect normal/bright     LYMPHATICS: No cervical adenopathy    Recent Labs   Lab Test 09/01/23  0937 08/23/23  0827 06/14/23  1428 04/21/23  1254 04/17/23  0610 04/19/22  0903 04/12/22  0853 02/23/22  1101 02/02/22  0758 09/24/21 2015 09/24/21  0654   HGB 14.1  --  14.0   < > 13.7   < >  --    < > 15.6   < > 14.9     --  231   < > 176   < >  --    < > 209   < > 144*   INR  --   --   --   --   --   --   --   --  0.90  --  0.85    141  --    < > 138   < >  --    < > 138   < > 141   POTASSIUM 3.7 3.8  --    < > 4.2   < >  --    < > 4.0   < > 3.6   CR 1.01 1.17  --    < > 0.91   < >  --    < > 1.10   < > 0.95   A1C  --   --   --   --  5.3  --  5.5  --   --   --   --     < > = values in this interval not displayed.        Diagnostics:  Labs pending at this time.  Results will be reviewed when available.   No EKG required for low risk surgery (cataract, skin procedure, breast biopsy, etc).    Revised Cardiac Risk Index (RCRI):  The patient has the following serious cardiovascular risks for perioperative complications:   - High risk surgery (>5% cardiac complication risk) = 1 point     RCRI Interpretation: 1 point: Class II (low risk - 0.9% complication rate)         Signed Electronically by: Santiago Guevara, DO  Copy of this  evaluation report is provided to requesting physician.

## 2023-09-27 ENCOUNTER — CONSULT (OUTPATIENT)
Dept: PAIN MEDICINE | Facility: CLINIC | Age: 78
End: 2023-09-27
Payer: MEDICARE

## 2023-09-27 VITALS
BODY MASS INDEX: 21.62 KG/M2 | SYSTOLIC BLOOD PRESSURE: 110 MMHG | TEMPERATURE: 98.2 F | HEIGHT: 63 IN | WEIGHT: 122 LBS | DIASTOLIC BLOOD PRESSURE: 68 MMHG

## 2023-09-27 DIAGNOSIS — M79.18 PIRIFORMIS MUSCLE PAIN: ICD-10-CM

## 2023-09-27 DIAGNOSIS — M70.72 ILIOPSOAS BURSITIS OF LEFT HIP: Primary | ICD-10-CM

## 2023-09-27 DIAGNOSIS — M53.3 SACROILIAC PAIN: ICD-10-CM

## 2023-09-27 PROCEDURE — 99204 OFFICE O/P NEW MOD 45 MIN: CPT | Performed by: ANESTHESIOLOGY

## 2023-09-27 RX ORDER — PREDNISONE 10 MG/1
TABLET ORAL
Qty: 36 TABLET | Refills: 0 | Status: SHIPPED | OUTPATIENT
Start: 2023-09-27

## 2023-09-27 NOTE — PROGRESS NOTES
Assessment  1. Iliopsoas bursitis of left hip    2. Sacroiliac pain    3. Piriformis muscle pain        Plan    Jose Jarquin is a pleasant 70-year-old female who I believe her pain is multifactorial.    She does have ischial bursitis, possible piriformis irritation, but I believe her overlying pathology is secondary to iliopsoas hypertonicity. Hip radiographs are essentially within normal limits. I did provide a list of home exercises and stretches targeting the psoas muscle. As she is going away to [place] for approximately 3 weeks of starting a titrating dose of oral steroid to address any inflammatory component of her pain. She understands not to take nonsteroidal anti-inflammatories till she is finished with a course of the oral steroids. If she has any problems or questions she will give her office a call. We will follow-up when she returns from [place] to see how she is doing. My impressions and treatment recommendations were discussed in detail with the patient who verbalized understanding and had no further questions. Discharge instructions were provided. I personally saw and examined the patient and I agree with the above discussed plan of care. This note is created using dictation transcription. It may contain typographical errors, grammatical errors, improperly dictated words, background noise and other errors. New Medications Ordered This Visit   Medications   • predniSONE 10 mg tablet     Sig: Take according to titration schedule     Dispense:  36 tablet     Refill:  0       History of Present Illness    Anayeli Plasencia is a 70 y.o. female 6 days month history of left hip pain. She is unaware of any clear present event denies any trauma or injury. Her pain is in her left buttocks left lateral thigh and groin. She underwent orthopedic evaluation and had hip radiographs performed.     Her pain is moderate to severe she has been undergoing physical therapy but her pain still interferes with her daily living activities. Is worse in the evening with described as pressure-like like a tight band around her left thigh. She reports that standing bending sitting walking all aggravate her symptoms while lying down relaxation reduces her pain. Physical therapy has provided moderate relief. Denies any loss of bowel or bladder function. I have personally reviewed and/or updated the patient's past medical history, past surgical history, family history, social history, current medications, allergies, and vital signs today. Review of Systems   Constitutional: Negative for fever and unexpected weight change. HENT: Negative for trouble swallowing. Eyes: Negative for visual disturbance. Respiratory: Negative for shortness of breath and wheezing. Cardiovascular: Negative for chest pain and palpitations. Gastrointestinal: Negative for constipation, diarrhea, nausea and vomiting. Endocrine: Negative for cold intolerance, heat intolerance and polydipsia. Genitourinary: Negative for difficulty urinating and frequency. Musculoskeletal: Negative for arthralgias, gait problem, joint swelling and myalgias. Skin: Negative for rash. Neurological: Negative for dizziness, seizures, syncope, weakness and headaches. Hematological: Does not bruise/bleed easily. Psychiatric/Behavioral: Negative for dysphoric mood. All other systems reviewed and are negative.       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     2/13/13 - Hemorrhoids, repeat 5 years d/t family h/o colon cancer   • FOOT SURGERY      mortons neuroma   • MOUTH SURGERY      Tooth Extraction - last assessed: Aug 19, 2014   • MOUTH SURGERY N/A 11/2017   • [place] AMPUTATION TOE METATARSOPHALANGEAL JOINT Right 10/28/2022    Procedure: AMPUTATION TOE Right Second;  Surgeon: Jonatan Alonzo DPM;  Location: [address] [place] [address];  Service: Podiatry   • SKIN BIOPSY Right 07/2018    right thigh   • TONSILLECTOMY      last assessed: Aug 19, 2014       Family History   Problem Relation Age of Onset   • Colon polyps Mother    • Colon cancer Mother         diagnosed in her 45s and 66's   • Endometrial cancer Mother         63's   • Cancer Mother         Endometrial/ hysterectomy; [place]-rectal cancer w/ colostomy; Colon cancer w/ colostomy revision   • Lung cancer Father 80   • Cancer Father         Lung   • COPD Father         Long time smoker   • No Known Problems Sister    • Colon cancer Brother 64   • No Known Problems Maternal Grandmother    • No Known Problems Maternal Grandfather    • No Known Problems Paternal Grandmother    • No Known Problems Paternal Grandfather    • Colon cancer Maternal Aunt         unknown age of onset   • No Known Problems Maternal Aunt    • No Known Problems Paternal Aunt    • No Known Problems Paternal Aunt    • No Known Problems Paternal Aunt    • No Known Problems Paternal Aunt    • No Known Problems Paternal Aunt    • Hypertension Sister         new onset 9/22       Social History     Occupational History   • Not on file   Tobacco Use • Smoking status: Never   • Smokeless tobacco: Never   Vaping Use   • Vaping Use: Never used   Substance and Sexual Activity   • Alcohol use: Yes     Alcohol/week: 4.0 standard drinks of alcohol     Types: 4 Glasses of wine per week     Comment: 1 per day - social/ drinks wine    • Drug use: Never   • Sexual activity: Yes     Partners: Male     Birth control/protection: Post-menopausal, Male Sterilization       Current Outpatient Medications on File Prior to Visit   Medication Sig   • betamethasone, augmented, (DIPROLENE) 0.05 % ointment APPLY TO RASH ON UPPER/ LOWER EXTREMITIES TWICE DAILY X10-14 DAYS (DO NOT USE ON FACE OR BODY FOLDS)   • cetirizine (ZyrTEC) 5 MG tablet Take 2.5 mg by mouth daily 1/2 tablet   • estradiol (Yuvafem) 10 MCG TABS vaginal tablet Insert 1 tablet (10 mcg total) into the vagina 2 (two) times a week At bedtime   • famotidine (PEPCID) 40 MG tablet Take 1 tablet (40 mg total) by mouth daily at bedtime   • latanoprost (XALATAN) 0.005 % ophthalmic solution INSTILL 1 DROP INTO BOTH EYES AT BEDTIME   • metoprolol succinate (TOPROL-XL) 25 mg 24 hr tablet Take 1 tablet (25 mg total) by mouth daily   • zolpidem (AMBIEN) 5 mg tablet TAKE 1/2 TO 1 TABLET BY MOUTH DAILY AS NEEDED FOR SLEEP     No current facility-administered medications on file prior to visit. No Known Allergies    Physical Exam    /68 (BP Location: Left arm, Patient Position: Sitting, Cuff Size: Adult)   Temp 98.2 °F (36.8 °C)   Ht 5' 3" (1.6 m) Comment: verbal  Wt 55.3 kg (122 lb)   LMP  (LMP Unknown)   BMI 21.61 kg/m²     Constitutional: normal, well developed, well nourished, alert, in no distress and non-toxic and no overt pain behavior.   Eyes: anicteric  HEENT: grossly intact  Neck: supple, symmetric, trachea midline and no masses   Pulmonary:even and unlabored  Cardiovascular:No edema or pitting edema present  Skin:Normal without rashes or lesions and well hydrated  Psychiatric:Mood and affect appropriate  Neurologic:Cranial Nerves II-XII grossly intact  Musculoskeletal:normal,  Inspection: Normal station and gait. Normal lumbar lordotic curve with no significant scoliosis or spinal step-off. Skin intact without erythema. No gross mass or muscle atrophy. Palpation: There is no tenderness to palpation overlying the sacroiliac joint as well as the ischial bursa bilateral. No significant tenderness over the greater trochanteric bursa bilaterally. Motor/Strength: 5/5 strength in the bilateral lower limbs. The patient is able to heel and/toe walk. Tandem gait is intact. Reflexes: Deep tendon reflex are 2+ and symmetrical bilateral patellar and Achilles  Sensation: Sensation intact to light touch and pinprick in the bilateral lower limbs. Proprioception is intact at bilateral hallux. Maneuvers: Negative bilateral straight leg raising. Negative Jabari's maneuver. Positive psoas sign on the left. Positive tenderness on the left PSIS    Imaging     XRAY LEFT HIP 09-14-23 @ [facility]    FINDINGS:     Bones are intact.     Alignment and joint spaces preserved.     No lytic or blastic bone lesions.     Lower spine degenerative change and scoliosis.     Mild soft tissue vascular calcification.     IMPRESSION:  No acute osseous abnormality    I have personally reviewed pertinent films in PACS and my interpretation is Minimum degenerative changes left hip.

## 2023-09-29 ENCOUNTER — OFFICE VISIT (OUTPATIENT)
Dept: PHYSICAL THERAPY | Facility: CLINIC | Age: 78
End: 2023-09-29
Payer: MEDICARE

## 2023-09-29 DIAGNOSIS — M25.511 CHRONIC RIGHT SHOULDER PAIN: ICD-10-CM

## 2023-09-29 DIAGNOSIS — M25.552 LEFT HIP PAIN: Primary | ICD-10-CM

## 2023-09-29 DIAGNOSIS — G89.29 CHRONIC RIGHT SHOULDER PAIN: ICD-10-CM

## 2023-09-29 PROCEDURE — 97140 MANUAL THERAPY 1/> REGIONS: CPT | Performed by: PHYSICAL THERAPIST

## 2023-09-29 PROCEDURE — 97112 NEUROMUSCULAR REEDUCATION: CPT | Performed by: PHYSICAL THERAPIST

## 2023-10-24 ENCOUNTER — APPOINTMENT (OUTPATIENT)
Dept: PHYSICAL THERAPY | Facility: CLINIC | Age: 78
End: 2023-10-24
Payer: MEDICARE

## 2023-10-31 ENCOUNTER — OFFICE VISIT (OUTPATIENT)
Dept: PAIN MEDICINE | Facility: CLINIC | Age: 78
End: 2023-10-31
Payer: MEDICARE

## 2023-10-31 ENCOUNTER — OFFICE VISIT (OUTPATIENT)
Dept: PHYSICAL THERAPY | Facility: CLINIC | Age: 78
End: 2023-10-31
Payer: MEDICARE

## 2023-10-31 VITALS
HEIGHT: 63 IN | TEMPERATURE: 97.6 F | SYSTOLIC BLOOD PRESSURE: 102 MMHG | HEART RATE: 71 BPM | BODY MASS INDEX: 21.97 KG/M2 | DIASTOLIC BLOOD PRESSURE: 78 MMHG | WEIGHT: 124 LBS

## 2023-10-31 DIAGNOSIS — G89.29 CHRONIC RIGHT SHOULDER PAIN: ICD-10-CM

## 2023-10-31 DIAGNOSIS — M75.51 BURSITIS OF RIGHT SHOULDER: ICD-10-CM

## 2023-10-31 DIAGNOSIS — M25.511 CHRONIC RIGHT SHOULDER PAIN: ICD-10-CM

## 2023-10-31 DIAGNOSIS — M25.552 LEFT HIP PAIN: Primary | ICD-10-CM

## 2023-10-31 DIAGNOSIS — M70.72 ILIOPSOAS BURSITIS OF LEFT HIP: Primary | ICD-10-CM

## 2023-10-31 PROCEDURE — 97535 SELF CARE MNGMENT TRAINING: CPT | Performed by: PHYSICAL THERAPIST

## 2023-10-31 PROCEDURE — 99213 OFFICE O/P EST LOW 20 MIN: CPT | Performed by: ANESTHESIOLOGY

## 2023-10-31 NOTE — PROGRESS NOTES
Assessment  1. Iliopsoas bursitis of left hip    2. Bursitis of right shoulder        Plan    Overall she no significant improvement since her initial consultation. She tried the course of steroids and has been undergoing physical therapy. Reports her pain is 50% better and not significant interfering with her daily living activities. At this point time she will continue with physical therapy and give us a call if her pain returns. She is planning to reach out to orthopedics regarding possibly putting off a shoulder injection tomorrow as she is doing well. I encouraged the phone call. My impressions and treatment recommendations were discussed in detail with the patient who verbalized understanding and had no further questions. Discharge instructions were provided. I personally saw and examined the patient and I agree with the above discussed plan of care. This note is created using dictation transcription. It may contain typographical errors, grammatical errors, improperly dictated words, background noise and other errors. History of Present Illness    Lamar Johnson is a 70 y.o. female who presents in follow-up from initial consultation regarding right shoulder pain and left hip pain. Overall she is noticing 50% reduction in symptoms after physical therapy. Her she has undergone orthopedic evaluation. She has occasional pain which scribes as dull and achy but does not interfere with her daily living activities. I have personally reviewed and/or updated the patient's past medical history, past surgical history, family history, social history, current medications, allergies, and vital signs today. Review of Systems   Constitutional:  Negative for fever and unexpected weight change. HENT:  Negative for trouble swallowing. Eyes:  Negative for visual disturbance. Respiratory:  Negative for shortness of breath and wheezing.     Cardiovascular:  Negative for chest pain and palpitations. Gastrointestinal:  Negative for constipation, diarrhea, nausea and vomiting. Endocrine: Negative for cold intolerance, heat intolerance and polydipsia. Genitourinary:  Negative for difficulty urinating and frequency. Musculoskeletal:  Negative for arthralgias, gait problem, joint swelling and myalgias. Skin:  Negative for rash. Neurological:  Negative for dizziness, seizures, syncope, weakness and headaches. Hematological:  Does not bruise/bleed easily. Psychiatric/Behavioral:  Negative for dysphoric mood. All other systems reviewed and are negative. 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     2/13/13 - Hemorrhoids, repeat 5 years d/t family h/o colon cancer    FOOT SURGERY      mortons neuroma    MOUTH SURGERY      Tooth Extraction - last assessed: Aug 19, 2014    MOUTH SURGERY N/A 11/2017    [place] AMPUTATION TOE METATARSOPHALANGEAL JOINT Right 10/28/2022    Procedure: AMPUTATION TOE Right Second;  Surgeon: Lisbet Green DPM;  Location: [facility] MAIN OR;  Service: Podiatry    SKIN BIOPSY Right 07/2018 right thigh    TONSILLECTOMY      last assessed: Aug 19, 2014       Family History   Problem Relation Age of Onset    Colon polyps Mother     Colon cancer Mother         diagnosed in her 45s and 66's    Endometrial cancer Mother         63's    Cancer Mother         Endometrial/ hysterectomy; [place]-rectal cancer w/ colostomy; Colon cancer w/ colostomy revision    Lung cancer Father 80    Cancer Father         Lung    COPD Father         Long time smoker    No Known Problems Sister     Colon cancer Brother 64    No Known Problems Maternal Grandmother     No Known Problems Maternal Grandfather     No Known Problems Paternal Grandmother     No Known Problems Paternal Grandfather     Colon cancer Maternal Aunt         unknown age of onset    No Known Problems Maternal Aunt     No Known Problems Paternal Aunt     No Known Problems Paternal Aunt     No Known Problems Paternal Aunt     No Known Problems Paternal Aunt     No Known Problems Paternal Aunt     Hypertension Sister         new onset 9/22       Social History     Occupational History    Not on file   Tobacco Use    Smoking status: Never    Smokeless tobacco: Never   Vaping Use    Vaping Use: Never used   Substance and Sexual Activity    Alcohol use:  Yes     Alcohol/week: 4.0 standard drinks of alcohol     Types: 4 Glasses of wine per week     Comment: 1 per day - social/ drinks wine     Drug use: Never    Sexual activity: Yes     Partners: Male     Birth control/protection: Post-menopausal, Male Sterilization       Current Outpatient Medications on File Prior to Visit   Medication Sig    cetirizine (ZyrTEC) 5 MG tablet Take 2.5 mg by mouth daily 1/2 tablet    estradiol (Yuvafem) 10 MCG TABS vaginal tablet Insert 1 tablet (10 mcg total) into the vagina 2 (two) times a week At bedtime    famotidine (PEPCID) 40 MG tablet Take 1 tablet (40 mg total) by mouth daily at bedtime    latanoprost (XALATAN) 0.005 % ophthalmic solution INSTILL 1 DROP INTO BOTH EYES AT BEDTIME metoprolol succinate (TOPROL-XL) 25 mg 24 hr tablet Take 1 tablet (25 mg total) by mouth daily    zolpidem (AMBIEN) 5 mg tablet TAKE 1/2 TO 1 TABLET BY MOUTH DAILY AS NEEDED FOR SLEEP    betamethasone, augmented, (DIPROLENE) 0.05 % ointment APPLY TO RASH ON UPPER/ LOWER EXTREMITIES TWICE DAILY X10-14 DAYS (DO NOT USE ON FACE OR BODY FOLDS)    predniSONE 10 mg tablet Take according to titration schedule     No current facility-administered medications on file prior to visit. No Known Allergies    Physical Exam    /78 (BP Location: Left arm, Patient Position: Sitting, Cuff Size: Standard)   Pulse 71   Temp 97.6 °F (36.4 °C)   Ht 5' 3" (1.6 m)   Wt 56.2 kg (124 lb)   LMP  (LMP Unknown)   BMI 21.97 kg/m²     Constitutional: normal, well developed, well nourished, alert, in no distress and non-toxic and no overt pain behavior. Eyes: anicteric  HEENT: grossly intact  Neck: supple, symmetric, trachea midline and no masses   Pulmonary:even and unlabored  Cardiovascular:No edema or pitting edema present  Skin:Normal without rashes or lesions and well hydrated  Psychiatric:Mood and affect appropriate  Neurologic:Cranial Nerves II-XII grossly intact  Musculoskeletal:normal    Imaging  LEFT HIP     INDICATION:   M25.552: Pain in left hip. Patient reports chronic pain. No trauma. COMPARISON:  None     VIEWS:  XR HIP/PELV 2-3 VWS LEFT  W PELVIS IF PERFORMED        FINDINGS:     Bones are intact. Alignment and joint spaces preserved. No lytic or blastic bone lesions. Lower spine degenerative change and scoliosis. Mild soft tissue vascular calcification. IMPRESSION:  No acute osseous abnormality. I have personally reviewed pertinent films in PACS.

## 2023-10-31 NOTE — PROGRESS NOTES
Daily Note     Today's date: 10/31/2023  Patient name: Poonam Segundo  : 1945  MRN: [id number]  Referring provider: Anila Mcmanus, *  Dx:   Encounter Diagnosis     ICD-10-CM    1. Left hip pain  M25.552       2. Chronic right shoulder pain  M25.511     G89.29                      Subjective: pt notes that she has been doing well. Minimal shoulder and hip pain. Pn mgmt has advised to hold on the shld and hip shots. Objective: See treatment diary below      Assessment: at this time pt has full active and passive hip and shoulder motion. Full strength of each as well. Has been able to control pain with self massage and strengthening. Will d/c to Hep at this time. Performed mmt, updated HEP, and spoke to pt about progression of it.      Plan: d/c to hep at this time   Precautions: none      Manuals 7/25 8/8 9/13 9/29 10/31        Right infraspinatus stm DB DB DB          Left gluteal stm DB DB DB DB                                   Neuro Re-Ed             Bridge band nv No band 2x8           Bridge ball nv nv x10          Dead bug nv nv           Sit to stand band nv nv           Hamstring iso   5''x10                                    Ther Ex             S/l abd(shld) nv 1# x10 0# x10           S/l flex to H abd nv 1# x10           S/l Er nv 2# 2x10           shld row nv            Supinated shld ext nv            clam    x10         Figure 4 str  15''x5           Hip IR s/l  D/c pn           S/l hip abd  2x10           Piriformis phase 1 and 2    15''x5 ea         Foam rolling     gluteals 3'         Gait Training             HEP   DB                       Modalities

## 2023-11-28 ENCOUNTER — OFFICE VISIT (OUTPATIENT)
Dept: FAMILY MEDICINE CLINIC | Facility: HOSPITAL | Age: 78
End: 2023-11-28
Payer: MEDICARE

## 2023-11-28 VITALS
TEMPERATURE: 98.2 F | HEIGHT: 63 IN | WEIGHT: 126 LBS | BODY MASS INDEX: 22.32 KG/M2 | DIASTOLIC BLOOD PRESSURE: 70 MMHG | HEART RATE: 88 BPM | SYSTOLIC BLOOD PRESSURE: 126 MMHG

## 2023-11-28 DIAGNOSIS — J01.90 ACUTE NON-RECURRENT SINUSITIS, UNSPECIFIED LOCATION: Primary | ICD-10-CM

## 2023-11-28 PROCEDURE — 99213 OFFICE O/P EST LOW 20 MIN: CPT | Performed by: INTERNAL MEDICINE

## 2023-11-28 RX ORDER — AMOXICILLIN AND CLAVULANATE POTASSIUM 875; 125 MG/1; MG/1
1 TABLET, FILM COATED ORAL EVERY 12 HOURS SCHEDULED
Qty: 14 TABLET | Refills: 0 | Status: SHIPPED | OUTPATIENT
Start: 2023-11-28 | End: 2023-12-05

## 2023-11-28 NOTE — PROGRESS NOTES
Name: Nini Deras      : 1945      MRN: [id number]  Encounter Provider: Binh Boykin DO  Encounter Date: 2023   Encounter department: [address]     1. Acute non-recurrent sinusitis, unspecified location  Comments:  Due to duration of symptoms will start Augmentin 875 mg bid x 7 days (SE reviewed), encouraged OTC symptomatic tx with decongestant and cough medication, gargles/hot tea/lozenges/rest/fluids encouraged, call if no better at all by end of abx OR with new/worse symptoms/F/SOB  Orders:  -     amoxicillin-clavulanate (AUGMENTIN) 875-125 mg per tablet; Take 1 tablet by mouth every 12 (twelve) hours for 7 days           Subjective      HPI Pt here for an acute visit    Pt here with about 1 1/2 wks of cold symptoms w/o much improvement. She notes nasal congestion/sinus pressure/HA's. She notes a runny nose and ST early on but that has improved. She notes a mild intermittent cough but denies SOB/wheezing/F/C. She feels ill and run down. She has been using Tylenol as needed. She notes no sick contacts. Review of Systems   Constitutional:  Positive for fatigue. Negative for chills and fever. HENT:  Positive for congestion, sinus pressure and sinus pain. Negative for ear pain, rhinorrhea and sore throat. Eyes:  Negative for discharge and redness. Respiratory:  Positive for cough. Negative for shortness of breath and wheezing. Gastrointestinal:  Negative for abdominal pain, diarrhea, nausea and vomiting. Genitourinary:  Negative for difficulty urinating and dysuria. Musculoskeletal:  Negative for arthralgias and myalgias. Skin:  Negative for rash. Neurological:  Negative for dizziness and headaches. Psychiatric/Behavioral:  Negative for confusion.         Current Outpatient Medications on File Prior to Visit   Medication Sig    betamethasone, augmented, (DIPROLENE) 0.05 % ointment APPLY TO RASH ON UPPER/ LOWER EXTREMITIES TWICE DAILY X10-14 DAYS (DO NOT USE ON FACE OR BODY FOLDS)    cetirizine (ZyrTEC) 5 MG tablet Take 2.5 mg by mouth daily 1/2 tablet    estradiol (Yuvafem) 10 MCG TABS vaginal tablet Insert 1 tablet (10 mcg total) into the vagina 2 (two) times a week At bedtime    famotidine (PEPCID) 40 MG tablet Take 1 tablet (40 mg total) by mouth daily at bedtime    latanoprost (XALATAN) 0.005 % ophthalmic solution INSTILL 1 DROP INTO BOTH EYES AT BEDTIME    metoprolol succinate (TOPROL-XL) 25 mg 24 hr tablet Take 1 tablet (25 mg total) by mouth daily    zolpidem (AMBIEN) 5 mg tablet TAKE 1/2 TO 1 TABLET BY MOUTH DAILY AS NEEDED FOR SLEEP    [DISCONTINUED] predniSONE 10 mg tablet Take according to titration schedule       Objective     /70   Pulse 88   Temp 98.2 °F (36.8 °C) (Tympanic)   Ht 5' 3" (1.6 m)   Wt 57.2 kg (126 lb)   LMP  (LMP Unknown)   BMI 22.32 kg/m²     Physical Exam  Vitals and nursing note reviewed. Constitutional:       General: She is not in acute distress. Appearance: She is well-developed. She is not ill-appearing. HENT:      Head: Normocephalic and atraumatic. Right Ear: Tympanic membrane and external ear normal. There is no impacted cerumen. Left Ear: Tympanic membrane and external ear normal.      Mouth/Throat:      Mouth: Mucous membranes are moist.      Pharynx: Oropharynx is clear. No oropharyngeal exudate. Eyes:      General:         Right eye: No discharge. Left eye: No discharge. Conjunctiva/sclera: Conjunctivae normal.   Neck:      Trachea: No tracheal deviation. Cardiovascular:      Rate and Rhythm: Normal rate and regular rhythm. Heart sounds: Normal heart sounds. No murmur heard. No friction rub. Pulmonary:      Effort: Pulmonary effort is normal. No respiratory distress. Breath sounds: Normal breath sounds. No wheezing, rhonchi or rales. Abdominal:      Palpations: Abdomen is soft.    Musculoskeletal:         General: No deformity or signs of injury. Cervical back: Neck supple. Lymphadenopathy:      Cervical: No cervical adenopathy. Skin:     General: Skin is warm. Coloration: Skin is not pale. Findings: No rash. Neurological:      General: No focal deficit present. Mental Status: She is alert. Motor: No abnormal muscle tone. Gait: Gait normal.   Psychiatric:         Mood and Affect: Mood normal.         Behavior: Behavior normal.         Thought Content:  Thought content normal.         Judgment: Judgment normal.       Any Seymour, DO

## 2023-12-26 ENCOUNTER — OFFICE VISIT (OUTPATIENT)
Dept: FAMILY MEDICINE CLINIC | Facility: HOSPITAL | Age: 78
End: 2023-12-26
Payer: MEDICARE

## 2023-12-26 VITALS
WEIGHT: 124.6 LBS | HEIGHT: 63 IN | TEMPERATURE: 97.8 F | SYSTOLIC BLOOD PRESSURE: 122 MMHG | DIASTOLIC BLOOD PRESSURE: 74 MMHG | BODY MASS INDEX: 22.08 KG/M2 | HEART RATE: 71 BPM

## 2023-12-26 DIAGNOSIS — B35.1 ONYCHOMYCOSIS: ICD-10-CM

## 2023-12-26 DIAGNOSIS — L03.039 INFECTION OF TOENAIL: Primary | ICD-10-CM

## 2023-12-26 DIAGNOSIS — I73.9 PERIPHERAL ARTERIAL DISEASE (HCC): ICD-10-CM

## 2023-12-26 PROCEDURE — 99214 OFFICE O/P EST MOD 30 MIN: CPT | Performed by: NURSE PRACTITIONER

## 2023-12-26 NOTE — PROGRESS NOTES
Name: Luisa Granados      : 1945      MRN: [id number]  Encounter Provider: MADY Torres  Encounter Date: 2023   Encounter department: [facility] CARE [address]     Assessment & Plan     1. Infection of toenail  Comments:  start augmentin as previously rx'd (never took), continue epsom soaks & consult podiatry    2. Peripheral arterial disease (HCC)  Assessment & Plan:  Discussed option to see vascular specialist if sx's persist/worsen - she denies need for at this time      3. Onychomycosis  Comments:  podiatry consult entered as she requests  Orders:  -     Ambulatory Referral to Podiatry; Future           Subjective      Knows she has microvascular circulation issues to toes - states she checked toe pressures before she left her job 6 years ago. States macrovascular circulation was good at that time. Feet are cool and sometimes purple. Soaks feet in epsom soaks and tries to keep feet warm. Toe of left foot has been more red and swollen with pus around nailbed. Hasn't seen vascular. Saw podiatry years ago for toe surgery.         Review of Systems   Musculoskeletal:  Positive for arthralgias (multiple toes).   Skin:  Positive for color change (bilat feet/toes).       Current Outpatient Medications on File Prior to Visit   Medication Sig    betamethasone, augmented, (DIPROLENE) 0.05 % ointment APPLY TO RASH ON UPPER/ LOWER EXTREMITIES TWICE DAILY X10-14 DAYS (DO NOT USE ON FACE OR BODY FOLDS)    cetirizine (ZyrTEC) 5 MG tablet Take 2.5 mg by mouth daily 1/2 tablet    estradiol (Yuvafem) 10 MCG TABS vaginal tablet Insert 1 tablet (10 mcg total) into the vagina 2 (two) times a week At bedtime    famotidine (PEPCID) 40 MG tablet Take 1 tablet (40 mg total) by mouth daily at bedtime    latanoprost (XALATAN) 0.005 % ophthalmic solution INSTILL 1 DROP INTO BOTH EYES AT BEDTIME    metoprolol succinate (TOPROL-XL) 25 mg 24 hr tablet Take 1 tablet (25 mg total) by mouth daily     "zolpidem (AMBIEN) 5 mg tablet TAKE 1/2 TO 1 TABLET BY MOUTH DAILY AS NEEDED FOR SLEEP       Objective     /74   Pulse 71   Temp 97.8 °F (36.6 °C)   Ht 5' 3\" (1.6 m)   Wt 56.5 kg (124 lb 9.6 oz)   LMP  (LMP Unknown)   BMI 22.07 kg/m²       Physical Exam  Vitals reviewed.   Constitutional:       General: She is not in acute distress.     Appearance: Normal appearance.   Pulmonary:      Effort: Pulmonary effort is normal. No respiratory distress.   Musculoskeletal:      Right lower leg: No edema.      Left lower leg: No edema.        Feet:    Feet:      Right foot:      Skin integrity: Erythema (multiple toes) present.      Left foot:      Skin integrity: Erythema (multiple toes) present.      Toenail Condition: Fungal disease present.  Skin:     General: Skin is warm and dry.   Neurological:      General: No focal deficit present.      Mental Status: She is alert and oriented to person, place, and time.      Sensory: No sensory deficit.   Psychiatric:         Mood and Affect: Mood normal.         Behavior: Behavior normal.         MADY Torres    "

## 2023-12-26 NOTE — ASSESSMENT & PLAN NOTE
Discussed option to see vascular specialist if sx's persist/worsen - she denies need for at this time

## 2023-12-27 NOTE — PROGRESS NOTES
Patient ID: Luisa Granados is a 78 y.o. female Date of Birth 1945       Chief Complaint   Patient presents with    Foot Pain     bilateral    Foot Problem     Left infected 3rd toe             Diagnosis:  1. Infection of toenail    2. Peripheral arterial disease (HCC)  -     VAS ARTERIAL DUPLEX- LOWER LIMB BILATERAL; Future; Expected date: 12/29/2023    3. Onychomycosis  Comments:  podiatry consult entered as she requests  Orders:  -     Ambulatory Referral to Podiatry    4. Raynaud's disease without gangrene  -     Ambulatory Referral to Vascular Surgery; Future      Show pedal examination with socks and shoes removed bilaterally.  The patient's PCP visit note from 12/26/2023.  Third toe left, infection has subsided, she is to complete all antibiotics as prescribed by PCP.  Onychomycosis, toenails were debrided x 10 without incident.  Today ordered lower extremity arterial Dopplers as patient does have rubor versus ischemic changes to distal tips of toes, explained to her I believe she has more of a Raynaud's going on as she stated in October when she went to [place] to visit her son the redness completely resolved and the warmth.  I have also referred patient to vascular surgery for Raynaud's versus small vessel disease.  I have advised patient to wear smart wool socks, to keep her feet as warm as possible, to wear warm slippers that are aligned, avoid being barefoot.  I sent a staff message to patient's PCP for possible adjustment of hypertension medication to either a calcium channel blocker or a vasodilator to assist with possible Raynaud's.  Patient understands and agrees with the plan and will follow-up as needed.          Subjective:   Jerri presents today upon referral from PCP for infected toe left foot.  She states she knows she has microvascular circulation issues to toes - states she checked toe pressures before she left her job 6 years ago. States macrovascular circulation was good at that time. Feet  are cool and sometimes purple. Soaks feet in epsom soaks and tries to keep feet warm. Toe of left foot has been more red and swollen with pus around nailbed. Hasn't seen vascular. Saw podiatry years ago for toe surgery.  PCP placed her on Augmentin and instructed her to continue with Epsom salt soaks.  She states she has seen Dr. Mondragon in the past, on 11/28/2022 she did have second toe amputation right foot for chronically dorsally dislocated painful hammertoe.  She is very happy with the results.        The following portions of the patient's history were reviewed and updated as appropriate:     Past Medical History:   Diagnosis Date    Breast asymmetry     Resolved: Nov 9, 2017    Colon polyp     GERD (gastroesophageal reflux disease) 4/23    Cetrizine    Guillain-Sims syndrome (HCC)     Hypertension 4/22    Metoprolol    Transaminitis     Last assessed: Mike 3, 2014    Varicose veins with pain, unspecified laterality     Resolved: June 6, 2017    Vasovagal syncope     last assessed: Dec 30, 2013       Past Surgical History:   Procedure Laterality Date    BREAST BIOPSY      unsure laterality or date of biopsy    CATARACT EXTRACTION, BILATERAL Bilateral 2018 january and april    COLONOSCOPY      1/2018: hemorrhoids, no polyps.    2/13/13 - Hemorrhoids, repeat 5 years d/t family h/o colon cancer    EYE SURGERY  2017/2018    Cataracts surgery OU, bilateral iStents    FOOT SURGERY      mortons neuroma    MOUTH SURGERY      Tooth Extraction - last assessed: Aug 19, 2014    MOUTH SURGERY N/A 11/2017    OR AMPUTATION TOE METATARSOPHALANGEAL JOINT Right 10/28/2022    Procedure: AMPUTATION TOE Right Second;  Surgeon: Derrick Mondragon DPM;  Location:  MAIN OR;  Service: Podiatry    SKIN BIOPSY Right 07/2018    right thigh    TONSILLECTOMY      last assessed: Aug 19, 2014       Social History     Socioeconomic History    Marital status: /Civil Union     Spouse name: None    Number of children: 2     Years of education: None    Highest education level: None   Occupational History    None   Tobacco Use    Smoking status: Never    Smokeless tobacco: Never   Vaping Use    Vaping status: Never Used   Substance and Sexual Activity    Alcohol use: Yes     Alcohol/week: 4.0 standard drinks of alcohol     Types: 4 Glasses of wine per week     Comment: 1 per day - social/ drinks wine     Drug use: No    Sexual activity: Yes     Partners: Male     Birth control/protection: Post-menopausal, Male Sterilization   Other Topics Concern    None   Social History Narrative    Always uses seat belt     Caffeine use     [facility]      Social Determinants of Health     Financial Resource Strain: Low Risk  (2/23/2023)    Overall Financial Resource Strain (CARDIA)     Difficulty of Paying Living Expenses: Not hard at all   Food Insecurity: Not on file   Transportation Needs: No Transportation Needs (2/23/2023)    PRAPARE - Transportation     Lack of Transportation (Medical): No     Lack of Transportation (Non-Medical): No   Physical Activity: Not on file   Stress: Not on file   Social Connections: Not on file   Intimate Partner Violence: Not on file   Housing Stability: Not on file          Current Outpatient Medications:     amoxicillin-clavulanate (AUGMENTIN) 875-125 mg per tablet, , Disp: , Rfl:     cetirizine (ZyrTEC) 5 MG tablet, Take 2.5 mg by mouth daily 1/2 tablet, Disp: , Rfl:     estradiol (Yuvafem) 10 MCG TABS vaginal tablet, Insert 1 tablet (10 mcg total) into the vagina 2 (two) times a week At bedtime, Disp: 24 tablet, Rfl: 3    famotidine (PEPCID) 40 MG tablet, Take 1 tablet (40 mg total) by mouth daily at bedtime, Disp: 90 tablet, Rfl: 2    latanoprost (XALATAN) 0.005 % ophthalmic solution, INSTILL 1 DROP INTO BOTH EYES AT BEDTIME, Disp: , Rfl:     metoprolol succinate (TOPROL-XL) 25 mg 24 hr tablet, Take 1 tablet (25 mg total) by mouth daily, Disp: 90 tablet, Rfl: 2    zolpidem (AMBIEN) 5 mg tablet, TAKE  "1/2 TO 1 TABLET BY MOUTH DAILY AS NEEDED FOR SLEEP, Disp: 15 tablet, Rfl: 0    betamethasone, augmented, (DIPROLENE) 0.05 % ointment, APPLY TO RASH ON UPPER/ LOWER EXTREMITIES TWICE DAILY X10-14 DAYS (DO NOT USE ON FACE OR BODY FOLDS) (Patient not taking: Reported on 12/29/2023), Disp: , Rfl: 1    Allergies  Patient has no known allergies.    Family History   Problem Relation Age of Onset    Colon polyps Mother     Colon cancer Mother         x2: colostomy; ileostomy    Endometrial cancer Mother         60's    Cancer Mother         Endometrial/ hysterectomy; [place]-rectal cancer w/ colostomy; Colon cancer w/ colostomy revision    Heart disease Mother         A Fib    Hearing loss Mother     Vision loss Mother         Macular degeneration    Lung cancer Father 88    Cancer Father         Lung    COPD Father         Long time smoker    No Known Problems Sister     Colon cancer Brother 61        Colon cancer    No Known Problems Maternal Grandmother     No Known Problems Maternal Grandfather     No Known Problems Paternal Grandmother     No Known Problems Paternal Grandfather     Colon cancer Maternal Aunt         unknown age of onset    No Known Problems Maternal Aunt     No Known Problems Paternal Aunt     No Known Problems Paternal Aunt     No Known Problems Paternal Aunt     No Known Problems Paternal Aunt     No Known Problems Paternal Aunt     Hypertension Sister         new onset 9/22           Objective:  /63 (BP Location: Left arm, Patient Position: Sitting, Cuff Size: Adult)   Pulse 79   Ht 5' 3\" (1.6 m)   Wt 56.2 kg (124 lb)   LMP  (LMP Unknown)   BMI 21.97 kg/m²     Review of Systems   Constitutional:  Negative for chills and fever.   HENT:  Negative for ear pain and sore throat.    Eyes:  Negative for pain and visual disturbance.   Respiratory:  Negative for cough and shortness of breath.    Cardiovascular:  Negative for chest pain and palpitations.   Gastrointestinal:  Negative for abdominal " pain and vomiting.   Genitourinary:  Negative for dysuria and hematuria.   Musculoskeletal:  Negative for arthralgias and back pain.   Skin:  Negative for color change and rash.        Infected toe left foot, onychomycosis   Neurological:  Negative for seizures and syncope.   All other systems reviewed and are negative.      Physical Exam  Constitutional:       Appearance: Normal appearance. She is well-developed and normal weight.   HENT:      Head: Normocephalic and atraumatic.      Nose: Nose normal.      Mouth/Throat:      Mouth: Mucous membranes are moist.      Pharynx: Oropharynx is clear.   Eyes:      Conjunctiva/sclera: Conjunctivae normal.   Cardiovascular:      Pulses:           Dorsalis pedis pulses are 2+ on the right side and 2+ on the left side.        Posterior tibial pulses are 2+ on the right side and 2+ on the left side.   Pulmonary:      Effort: Pulmonary effort is normal.   Musculoskeletal:         General: Normal range of motion.      Cervical back: Normal range of motion.      Right lower leg: No edema.      Left lower leg: No edema.      Right foot: Deformity present.      Left foot: Deformity present.   Feet:      Right foot:      Protective Sensation: 10 sites tested.  10 sites sensed.      Left foot:      Protective Sensation: 10 sites tested.  10 sites sensed.      Comments: Prior second toe amputation right foot, dorsally dislocated rigid hammertoes 3, 4 of right foot with corn distal lateral plantar tip third toe.  Third toe left, infection is resolved, no drainage is noted, no erythema, edema, there is onycholysis, nail was removed and nailbed is intact.  Fat pad atrophy submetatarsal heads 1 through 5  Pes cavus foot type bilateral  Toes are cool to touch with rubor to distal tips, they are blanchable, capillary filling time is slightly sluggish, proximal to all IPJ's toes are warm and color is within normal limits.  Skin:     General: Skin is warm and dry.      Capillary Refill:  "Capillary refill takes less than 2 seconds.   Neurological:      General: No focal deficit present.      Mental Status: She is alert and oriented to person, place, and time. Mental status is at baseline.   Psychiatric:         Mood and Affect: Mood normal.         Behavior: Behavior normal.         Thought Content: Thought content normal.         Judgment: Judgment normal.                 No pertinent results found.      Camille Casas, OMAR, OMAR, FACFAS    Portions of the record may have been created with voice recognition software. Occasional wrong word or \"sound a like\" substitutions may have occurred due to the inherent limitations of voice recognition software. Read the chart carefully and recognize, using context, where substitutions have occurred.  "

## 2023-12-29 ENCOUNTER — TELEPHONE (OUTPATIENT)
Dept: FAMILY MEDICINE CLINIC | Facility: HOSPITAL | Age: 78
End: 2023-12-29

## 2023-12-29 ENCOUNTER — OFFICE VISIT (OUTPATIENT)
Dept: PODIATRY | Facility: CLINIC | Age: 78
End: 2023-12-29
Payer: MEDICARE

## 2023-12-29 VITALS
BODY MASS INDEX: 21.97 KG/M2 | SYSTOLIC BLOOD PRESSURE: 111 MMHG | HEIGHT: 63 IN | DIASTOLIC BLOOD PRESSURE: 63 MMHG | WEIGHT: 124 LBS | HEART RATE: 79 BPM

## 2023-12-29 DIAGNOSIS — I73.00 RAYNAUD'S DISEASE WITHOUT GANGRENE: ICD-10-CM

## 2023-12-29 DIAGNOSIS — I73.9 PERIPHERAL ARTERIAL DISEASE (HCC): ICD-10-CM

## 2023-12-29 DIAGNOSIS — L03.039 INFECTION OF TOENAIL: Primary | ICD-10-CM

## 2023-12-29 DIAGNOSIS — B35.1 ONYCHOMYCOSIS: ICD-10-CM

## 2023-12-29 PROCEDURE — 99213 OFFICE O/P EST LOW 20 MIN: CPT | Performed by: PODIATRIST

## 2023-12-29 NOTE — TELEPHONE ENCOUNTER
JUST WANTED TO LET US KNOW THAT THE ABX IS CAUSING DIARRHEA -     SHOULD SHE CONTINUE TO TAKE THE ABX?    PCB

## 2023-12-29 NOTE — TELEPHONE ENCOUNTER
Diarrhea can be typical w/any antibiotic - may help to take a probiotic and/or eat yogurt consistently while on the antibiotic (she should not have either within 2 hours of taking antibiotic). She should try to continue it if possible, unless Dr Casas has another suggestion based on her exam....

## 2024-01-02 ENCOUNTER — TELEPHONE (OUTPATIENT)
Dept: FAMILY MEDICINE CLINIC | Facility: HOSPITAL | Age: 79
End: 2024-01-02

## 2024-01-02 ENCOUNTER — TELEPHONE (OUTPATIENT)
Age: 79
End: 2024-01-02

## 2024-01-02 DIAGNOSIS — L08.9 TOE INFECTION: Primary | ICD-10-CM

## 2024-01-02 RX ORDER — AMOXICILLIN AND CLAVULANATE POTASSIUM 875; 125 MG/1; MG/1
1 TABLET, FILM COATED ORAL EVERY 12 HOURS SCHEDULED
Qty: 6 TABLET | Refills: 0 | Status: SHIPPED | OUTPATIENT
Start: 2024-01-02 | End: 2024-01-05

## 2024-01-02 NOTE — TELEPHONE ENCOUNTER
Caller: Jerri Granados    Doctor/Office: Dr. Casas/[facility]#: [phone]    Escalation: Care/said she is having issue now with the left foot great toe after trimming by . Said it is very sore and thinks she needs another few days of antibiotics-just took her last one today. Her original problem is much better, but now worried about this issue. It is red and very sore/painful/cannot wear a shoe/a sock even hurts it. Please call pt back and advise. Thanks

## 2024-01-02 NOTE — TELEPHONE ENCOUNTER
LM ON OUR VM @ 940AM    Hey, Hi. Good morning. It's Jerri Bauer 1344. I'm calling because I have completed a seven day course of Augmentin which when I saw Robert she suggested that I take for toe infect for my infected toe. Since then it has improved, but she, I believe thought I was on a 10 day course and told me if at this point I thought it wasn't completely better, I should go ahead and complete 10 days. So I think what I need is another three days. I'll be happy to come in and see her doctor Erik Maynard if you like. My big toes got really sore after I saw the foot doctor and she turned my toenails back. The pressure against my big toes caused me to not even be able to wear shoes. That is improving, but they're still kind of pink and they look like they could use some more antibiotics, I think. So let me know what to do. Either  a renewal for three more days or come and see somebody to see if they want to do that again. Jerri Luna1, [phone]. Thank you.      PCB

## 2024-01-02 NOTE — TELEPHONE ENCOUNTER
Please let patient know I sent another few days of augmentin to her pharmacy. If infection does not resolve with this suggest she follow up with podiatry for further recommendation....

## 2024-01-02 NOTE — TELEPHONE ENCOUNTER
Patient called again regarding medication:        Hey this is Jerri Bauer [birth year] [id number] Still awaiting a call back regarding additional antibiotic coverage for me that I really need. Called in today if they want to do that. OK. Hoping to hear from you guys. I know you're really swamped. Thanks. Mert.  You received a voice mail from [employer].

## 2024-01-26 ENCOUNTER — OFFICE VISIT (OUTPATIENT)
Dept: FAMILY MEDICINE CLINIC | Facility: HOSPITAL | Age: 79
End: 2024-01-26
Payer: MEDICARE

## 2024-01-26 ENCOUNTER — TELEPHONE (OUTPATIENT)
Dept: FAMILY MEDICINE CLINIC | Facility: HOSPITAL | Age: 79
End: 2024-01-26

## 2024-01-26 VITALS
WEIGHT: 127.2 LBS | HEART RATE: 68 BPM | DIASTOLIC BLOOD PRESSURE: 64 MMHG | TEMPERATURE: 97.2 F | SYSTOLIC BLOOD PRESSURE: 122 MMHG | BODY MASS INDEX: 22.54 KG/M2 | HEIGHT: 63 IN | OXYGEN SATURATION: 98 %

## 2024-01-26 DIAGNOSIS — M79.674 PAIN OF RIGHT GREAT TOE: ICD-10-CM

## 2024-01-26 DIAGNOSIS — L81.9 DISCOLORATION OF SKIN OF TOE: Primary | ICD-10-CM

## 2024-01-26 DIAGNOSIS — I73.9 PERIPHERAL ARTERIAL DISEASE (HCC): ICD-10-CM

## 2024-01-26 PROCEDURE — 99213 OFFICE O/P EST LOW 20 MIN: CPT | Performed by: NURSE PRACTITIONER

## 2024-01-26 NOTE — PROGRESS NOTES
Name: Luisa Granados      : 1945      MRN: [id number]  Encounter Provider: MADY Torres  Encounter Date: 2024   Encounter department: [facility] CARE [address]     Assessment & Plan     1. Discoloration of skin of toe    2. Pain of right great toe    3. Peripheral arterial disease (HCC)      Reassured her, skin discoloration is more consistent w/vascular changes and not infectious/cellulitic. Advise she refrain from further antibiotic use  Continue to keep extremities warm   Proceed with arterial study as scheduled next week & establish w/vascular in March as scheduled       Subjective      Here with ongoing toe concerns. Treated with antibiotic last month for probable toe infection. This improved before she saw podiatry. Podiatrist trimmed nails and ordered arterial study - this is scheduled next week. She is scheduled to see vascular in March. Right great toe remains painful at times after on feet a lot. She has been keeping feet warm wearing wool socks and heated boots.          Review of Systems   Constitutional:  Negative for chills and fever.   Skin:  Positive for color change (multiple toes bilat feet).       Current Outpatient Medications on File Prior to Visit   Medication Sig    cetirizine (ZyrTEC) 5 MG tablet Take 2.5 mg by mouth daily 1/2 tablet    estradiol (Yuvafem) 10 MCG TABS vaginal tablet Insert 1 tablet (10 mcg total) into the vagina 2 (two) times a week At bedtime    famotidine (PEPCID) 40 MG tablet Take 1 tablet (40 mg total) by mouth daily at bedtime    latanoprost (XALATAN) 0.005 % ophthalmic solution INSTILL 1 DROP INTO BOTH EYES AT BEDTIME    metoprolol succinate (TOPROL-XL) 25 mg 24 hr tablet Take 1 tablet (25 mg total) by mouth daily    zolpidem (AMBIEN) 5 mg tablet TAKE 1/2 TO 1 TABLET BY MOUTH DAILY AS NEEDED FOR SLEEP    betamethasone, augmented, (DIPROLENE) 0.05 % ointment APPLY TO RASH ON UPPER/ LOWER EXTREMITIES TWICE DAILY X10-14 DAYS (DO NOT  "USE ON FACE OR BODY FOLDS) (Patient not taking: Reported on 12/29/2023)       Objective     /64   Pulse 68   Temp (!) 97.2 °F (36.2 °C)   Ht 5' 3\" (1.6 m)   Wt 57.7 kg (127 lb 3.2 oz)   LMP  (LMP Unknown)   SpO2 98%   BMI 22.53 kg/m²       Physical Exam  Vitals reviewed.   Constitutional:       General: She is not in acute distress.     Appearance: Normal appearance.   HENT:      Head: Normocephalic.   Eyes:      General: No scleral icterus.  Cardiovascular:      Pulses:           Dorsalis pedis pulses are 1+ on the right side and 1+ on the left side.        Posterior tibial pulses are 1+ on the right side and 1+ on the left side.   Pulmonary:      Effort: Pulmonary effort is normal. No respiratory distress.   Musculoskeletal:      Right lower leg: No edema.      Left lower leg: No edema.        Feet:    Feet:      Right foot:      Skin integrity: Erythema present. No skin breakdown.      Toenail Condition: Fungal disease present.     Left foot:      Skin integrity: No skin breakdown.      Toenail Condition: Fungal disease present.  Skin:     General: Skin is dry.   Neurological:      General: No focal deficit present.      Mental Status: She is alert and oriented to person, place, and time.   Psychiatric:         Mood and Affect: Mood normal.         Behavior: Behavior normal.         MADY Torres    "

## 2024-01-31 ENCOUNTER — HOSPITAL ENCOUNTER (OUTPATIENT)
Dept: NON INVASIVE DIAGNOSTICS | Facility: HOSPITAL | Age: 79
Discharge: HOME | End: 2024-01-31
Attending: PODIATRIST
Payer: MEDICARE

## 2024-01-31 DIAGNOSIS — I73.9 PERIPHERAL ARTERIAL DISEASE (HCC): ICD-10-CM

## 2024-01-31 PROCEDURE — 93923 UPR/LXTR ART STDY 3+ LVLS: CPT

## 2024-01-31 PROCEDURE — 93922 UPR/L XTREMITY ART 2 LEVELS: CPT | Performed by: SURGERY

## 2024-01-31 PROCEDURE — 93925 LOWER EXTREMITY STUDY: CPT | Performed by: SURGERY

## 2024-01-31 PROCEDURE — 93925 LOWER EXTREMITY STUDY: CPT

## 2024-02-02 ENCOUNTER — TELEPHONE (OUTPATIENT)
Dept: PODIATRY | Facility: CLINIC | Age: 79
End: 2024-02-02

## 2024-02-02 NOTE — TELEPHONE ENCOUNTER
Called patient and let her know her lower extremity arterial Doppler results are within normal limits.  Dr. Casas believes you are struggling with Raynaud's. Dr. Casas suggests you  following-up with your PCP, sometimes they will try blood pressure medications that might help with Raynaud's.     Jerri said she already has an appointment with her PCP in a couple weeks. She will discuss this with him then.  Thank you for giving her a call.

## 2024-02-02 NOTE — RESULT ENCOUNTER NOTE
Please call patient and let her know her lower extremity arterial Doppler results are within normal limits.  I believe she is struggling with Raynaud's, I encouraged her to follow-up with her primary care doctor to see if they can try some blood pressure medications that might help her with this.

## 2024-02-15 ENCOUNTER — HOSPITAL ENCOUNTER (OUTPATIENT)
Dept: MAMMOGRAPHY | Facility: CLINIC | Age: 79
Discharge: HOME | End: 2024-02-15
Payer: MEDICARE

## 2024-02-15 ENCOUNTER — HOSPITAL ENCOUNTER (OUTPATIENT)
Dept: BONE DENSITY | Facility: CLINIC | Age: 79
Discharge: HOME | End: 2024-02-15
Payer: MEDICARE

## 2024-02-15 VITALS — HEIGHT: 63 IN | WEIGHT: 127 LBS | BODY MASS INDEX: 22.5 KG/M2

## 2024-02-15 VITALS — HEIGHT: 62 IN | WEIGHT: 127 LBS | BODY MASS INDEX: 23.37 KG/M2

## 2024-02-15 DIAGNOSIS — M85.852 OSTEOPENIA OF NECK OF LEFT FEMUR: ICD-10-CM

## 2024-02-15 DIAGNOSIS — Z12.31 ENCOUNTER FOR SCREENING MAMMOGRAM FOR BREAST CANCER: ICD-10-CM

## 2024-02-15 PROCEDURE — 77067 SCR MAMMO BI INCL CAD: CPT

## 2024-02-15 PROCEDURE — 77080 DXA BONE DENSITY AXIAL: CPT

## 2024-02-15 PROCEDURE — 77063 BREAST TOMOSYNTHESIS BI: CPT

## 2024-02-16 ENCOUNTER — OFFICE VISIT (OUTPATIENT)
Dept: FAMILY MEDICINE CLINIC | Facility: HOSPITAL | Age: 79
End: 2024-02-16
Payer: MEDICARE

## 2024-02-16 VITALS
HEIGHT: 62 IN | WEIGHT: 126.4 LBS | SYSTOLIC BLOOD PRESSURE: 124 MMHG | HEART RATE: 62 BPM | DIASTOLIC BLOOD PRESSURE: 66 MMHG | BODY MASS INDEX: 23.26 KG/M2 | TEMPERATURE: 97.6 F

## 2024-02-16 DIAGNOSIS — N95.2 VAGINAL ATROPHY: ICD-10-CM

## 2024-02-16 DIAGNOSIS — I73.00 RAYNAUD'S DISEASE WITHOUT GANGRENE: Primary | ICD-10-CM

## 2024-02-16 DIAGNOSIS — I10 PRIMARY HYPERTENSION: ICD-10-CM

## 2024-02-16 PROCEDURE — 99214 OFFICE O/P EST MOD 30 MIN: CPT | Performed by: INTERNAL MEDICINE

## 2024-02-16 RX ORDER — ESTRADIOL 10 UG/1
TABLET VAGINAL
Qty: 24 TABLET | Refills: 0 | Status: SHIPPED | OUTPATIENT
Start: 2024-02-16 | End: 2024-02-19 | Stop reason: SDUPTHER

## 2024-02-16 RX ORDER — AMLODIPINE BESYLATE 2.5 MG/1
2.5 TABLET ORAL DAILY
Qty: 30 TABLET | Refills: 2 | Status: SHIPPED | OUTPATIENT
Start: 2024-02-16

## 2024-02-16 NOTE — ASSESSMENT & PLAN NOTE
BP at goal today, I dont think 2.5 mg of Amlodipine is going to decrease BP below nml/make her symptomatic so will con't current Metoprolol for now and add Amlodipine 2.5 mg 1 tab PO q day for her Raynauds, if has s/sx of low BP OR actual low BP readings (she does have BP cuff at home) she was instructed to call and would decrease Metoprolol accordingly

## 2024-02-16 NOTE — PROGRESS NOTES
Name: Luisa Granados      : 1945      MRN: [id number]  Encounter Provider: Sherry Kc DO  Encounter Date: 2024   Encounter department: [facility] [id number]     Assessment & Plan     1. Raynaud's disease without gangrene  Assessment & Plan:  Pathophysiology and tx options reviewed - pt has had LE edema at end of day with 5 mg of Amlodipine - discussed this may have just been dependent edema and recommended trying Amlodipine 2.5 mg 1 tab PO q day, will re-eval in 1-2 wks and titrate up according for symptoms control, may need to decrease metoprolol if go up on dose of CCB, urged to keep feet/hands warm and avoid prolonged exposure to cold, call with sores/ulcers    Orders:  -     amLODIPine (NORVASC) 2.5 mg tablet; Take 1 tablet (2.5 mg total) by mouth daily    2. Primary hypertension  Assessment & Plan:  BP at goal today, I dont think 2.5 mg of Amlodipine is going to decrease BP below nml/make her symptomatic so will con't current Metoprolol for now and add Amlodipine 2.5 mg 1 tab PO q day for her Raynauds, if has s/sx of low BP OR actual low BP readings (she does have BP cuff at home) she was instructed to call and would decrease Metoprolol accordingly         Colonoscopy  - 5 yrs    Mammo     Dexa  - osteopenia    BW       Subjective      HPI Pt here for an acute visit    She has had B/L foot pain  and discoloration of her toes - saad in cold.  She saw podiatry and has seen Robert.  She had LEAD and it was normal.  She was told it was Raynaud's.  Podiatry has reached out about switching to CCB/vasodilator.  Chart reviewed and pt has end of day LLE on Amlodipine 5 mg and still unclear why switched from Olmestartan to metoprolol.   BP is good today on current Metoprolol. She does not check BP at home.  She notes no frequent HA's/dizziness/double vision/CP.      Review of Systems   Constitutional:  Negative for chills and fever.   Respiratory:  Negative for  "cough and shortness of breath.    Cardiovascular:  Negative for chest pain and palpitations.   Skin:  Positive for color change. Negative for wound.   Neurological:  Negative for dizziness and headaches.       Current Outpatient Medications on File Prior to Visit   Medication Sig    betamethasone, augmented, (DIPROLENE) 0.05 % ointment APPLY TO RASH ON UPPER/ LOWER EXTREMITIES TWICE DAILY X10-14 DAYS (DO NOT USE ON FACE OR BODY FOLDS) (Patient not taking: Reported on 12/29/2023)    cetirizine (ZyrTEC) 5 MG tablet Take 2.5 mg by mouth daily 1/2 tablet    estradiol (Yuvafem) 10 MCG TABS vaginal tablet Insert 1 tablet (10 mcg total) into the vagina 2 (two) times a week At bedtime    famotidine (PEPCID) 40 MG tablet Take 1 tablet (40 mg total) by mouth daily at bedtime    latanoprost (XALATAN) 0.005 % ophthalmic solution INSTILL 1 DROP INTO BOTH EYES AT BEDTIME    metoprolol succinate (TOPROL-XL) 25 mg 24 hr tablet Take 1 tablet (25 mg total) by mouth daily    zolpidem (AMBIEN) 5 mg tablet TAKE 1/2 TO 1 TABLET BY MOUTH DAILY AS NEEDED FOR SLEEP       Objective     /66 (BP Location: Left arm, Patient Position: Sitting, Cuff Size: Standard)   Pulse 62   Temp 97.6 °F (36.4 °C) (Tympanic)   Ht 5' 2.25\" (1.581 m)   Wt 57.3 kg (126 lb 6.4 oz)   LMP  (LMP Unknown)   BMI 22.93 kg/m²     Physical Exam  Vitals and nursing note reviewed.   Constitutional:       General: She is not in acute distress.     Appearance: She is well-developed. She is not ill-appearing.   HENT:      Head: Normocephalic and atraumatic.   Eyes:      General:         Right eye: No discharge.         Left eye: No discharge.      Conjunctiva/sclera: Conjunctivae normal.   Neck:      Trachea: No tracheal deviation.   Pulmonary:      Effort: Pulmonary effort is normal. No respiratory distress.   Skin:     Coloration: Skin is not pale.      Findings: No bruising.      Comments: All toes B/L feet - saad plantar surface are a purple-blue in color, no " ulcers present but some peeling skin noted plantar surface of R great toe   Neurological:      General: No focal deficit present.      Mental Status: She is alert. Mental status is at baseline.      Motor: No abnormal muscle tone.      Gait: Gait normal.   Psychiatric:         Mood and Affect: Mood normal.         Behavior: Behavior normal.         Thought Content: Thought content normal.         Judgment: Judgment normal.       Sherry Kc DO

## 2024-02-16 NOTE — ASSESSMENT & PLAN NOTE
Pathophysiology and tx options reviewed - pt has had LE edema at end of day with 5 mg of Amlodipine - discussed this may have just been dependent edema and recommended trying Amlodipine 2.5 mg 1 tab PO q day, will re-eval in 1-2 wks and titrate up according for symptoms control, may need to decrease metoprolol if go up on dose of CCB, urged to keep feet/hands warm and avoid prolonged exposure to cold, call with sores/ulcers

## 2024-02-19 ENCOUNTER — RA CDI HCC (OUTPATIENT)
Dept: OTHER | Facility: HOSPITAL | Age: 79
End: 2024-02-19

## 2024-02-19 ENCOUNTER — OFFICE VISIT (OUTPATIENT)
Dept: GYNECOLOGY | Facility: CLINIC | Age: 79
End: 2024-02-19
Payer: MEDICARE

## 2024-02-19 VITALS
HEIGHT: 62 IN | BODY MASS INDEX: 23.3 KG/M2 | DIASTOLIC BLOOD PRESSURE: 76 MMHG | SYSTOLIC BLOOD PRESSURE: 120 MMHG | WEIGHT: 126.6 LBS

## 2024-02-19 DIAGNOSIS — N95.2 VAGINAL ATROPHY: ICD-10-CM

## 2024-02-19 DIAGNOSIS — R46.89 CONCERN ABOUT APPEARANCE OF BREAST: ICD-10-CM

## 2024-02-19 DIAGNOSIS — Z12.31 ENCOUNTER FOR SCREENING MAMMOGRAM FOR BREAST CANCER: Primary | ICD-10-CM

## 2024-02-19 PROBLEM — Z80.0 FAMILY HISTORY OF COLON CANCER: Status: RESOLVED | Noted: 2023-03-14 | Resolved: 2024-02-19

## 2024-02-19 PROCEDURE — 99213 OFFICE O/P EST LOW 20 MIN: CPT | Performed by: OBSTETRICS & GYNECOLOGY

## 2024-02-19 RX ORDER — ESTRADIOL 10 UG/1
1 INSERT VAGINAL 2 TIMES WEEKLY
Qty: 24 TABLET | Refills: 3 | Status: SHIPPED | OUTPATIENT
Start: 2024-02-19

## 2024-02-19 NOTE — PROGRESS NOTES
Assessment/Plan:    Increase in breast size-she was reassured that her exam is normal.  Mammogram reviewed with her including breast density.  RX given for next year  Discussed self breast exams    colon cancer [id number]    Mild osteopenia-this is stable.  She exercises a great deal        No problem-specific Assessment & Plan notes found for this encounter.       Diagnoses and all orders for this visit:    Encounter for annual routine gynecological examination    Encounter for screening mammogram for breast cancer  -     Mammo screening bilateral w 3d & cad; Future    Vaginal atrophy  -     estradiol (Yuvafem) 10 MCG TABS vaginal tablet; Insert 1 tablet (10 mcg total) into the vagina 2 (two) times a week at bedtime          Subjective:      Patient ID: Luisa Granados is a 79 y.o. female.    Patient here for follow-up of her generic Vagifem.  This is working well for her and she would like to continue.  This past year, she was dx with Mayito.  She has had some difficulty but it seems to be getting better.    She has some concerns because she feels that her breast size has increased over the past year.  It is symmetric and she does not notice any masses.  She gained 4 pounds since last year.    Normal 3D mammogram last week with average risk and scattered fibroglandular densities  DEXA also done last week with stable and mild osteopenia, she does not meet treatment criteria        The following portions of the patient's history were reviewed and updated as appropriate: allergies, current medications, past family history, past medical history, past social history, past surgical history, and problem list.    Review of Systems   Constitutional: Negative.    Gastrointestinal: Negative.    Genitourinary: Negative.          Objective:      LMP  (LMP Unknown)          Physical Exam  Vitals reviewed.   Constitutional:       Appearance: Normal appearance. She is well-developed.   Neck:      Thyroid: No  thyromegaly.      Trachea: No tracheal deviation.   Cardiovascular:      Rate and Rhythm: Normal rate and regular rhythm.   Pulmonary:      Effort: Pulmonary effort is normal.      Breath sounds: Normal breath sounds.   Chest:   Breasts:     Breasts are symmetrical.      Right: No inverted nipple, mass, nipple discharge, skin change or tenderness.      Left: No inverted nipple, mass, nipple discharge, skin change or tenderness.   Abdominal:      General: There is no distension.      Palpations: Abdomen is soft. There is no mass.      Tenderness: There is no abdominal tenderness.   Genitourinary:     Labia:         Right: No rash, tenderness, lesion or injury.         Left: No rash, tenderness, lesion or injury.       Vagina: Normal.      Cervix: No cervical motion tenderness, discharge or friability.      Adnexa:         Right: No mass, tenderness or fullness.          Left: No mass, tenderness or fullness.        Rectum: Normal.   Neurological:      Mental Status: She is alert.

## 2024-02-26 ENCOUNTER — OFFICE VISIT (OUTPATIENT)
Dept: FAMILY MEDICINE CLINIC | Facility: HOSPITAL | Age: 79
End: 2024-02-26
Payer: MEDICARE

## 2024-02-26 VITALS
HEIGHT: 62 IN | BODY MASS INDEX: 23.19 KG/M2 | DIASTOLIC BLOOD PRESSURE: 72 MMHG | WEIGHT: 126 LBS | SYSTOLIC BLOOD PRESSURE: 118 MMHG

## 2024-02-26 DIAGNOSIS — I73.00 RAYNAUD'S DISEASE WITHOUT GANGRENE: Primary | ICD-10-CM

## 2024-02-26 DIAGNOSIS — N39.42 URINARY INCONTINENCE WITHOUT SENSORY AWARENESS: ICD-10-CM

## 2024-02-26 DIAGNOSIS — I10 PRIMARY HYPERTENSION: ICD-10-CM

## 2024-02-26 DIAGNOSIS — Z00.00 MEDICARE ANNUAL WELLNESS VISIT, SUBSEQUENT: ICD-10-CM

## 2024-02-26 PROCEDURE — G0439 PPPS, SUBSEQ VISIT: HCPCS | Performed by: INTERNAL MEDICINE

## 2024-02-26 PROCEDURE — 99214 OFFICE O/P EST MOD 30 MIN: CPT | Performed by: INTERNAL MEDICINE

## 2024-02-26 RX ORDER — AMLODIPINE BESYLATE 5 MG/1
5 TABLET ORAL DAILY
Qty: 30 TABLET | Refills: 2 | Status: SHIPPED | OUTPATIENT
Start: 2024-02-26

## 2024-02-26 NOTE — PROGRESS NOTES
Assessment and Plan:     Problem List Items Addressed This Visit          Cardiovascular and Mediastinum    Primary hypertension     Increase Amlodipine from 2.5 mg to 5 mg for Raynauds symptoms - stop Metoprolol d/t low nml BP (asymptomatic), recheck BP in 6-8 wks, call with symptoms of low BP/low BP readings at home/SE with increase in Amlodipine (had LE edema when on 5 mg in past)         Relevant Medications    amLODIPine (NORVASC) 5 mg tablet    Other Relevant Orders    CBC    Comprehensive metabolic panel    Lipid panel    TSH, 3rd generation with Free T4 reflex    Raynaud's disease without gangrene - Primary     No great benefit with low dose Amlodipine - will increase from 2.5 mg to 5 mg and watch closely as rx was stopped last time at this dose d/t LE edema, will stop Metoprolol d/t low nml BP,  has appt with vascular in 2.5 wks, will follow but pt was instructed to call with new/worse symptoms/ulcerations         Relevant Medications    amLODIPine (NORVASC) 5 mg tablet    Other Relevant Orders    CBC    Comprehensive metabolic panel    Lipid panel    TSH, 3rd generation with Free T4 reflex     Other Visit Diagnoses       Medicare annual wellness visit, subsequent        Urinary incontinence without sensory awareness                Depression Screening and Follow-up Plan: Patient was screened for depression during today's encounter. They screened negative with a PHQ-2 score of 0.    Urinary Incontinence Plan of Care: counseling topics discussed: practice Kegel (pelvic floor strengthening) exercises, use restroom every 2 hours, limit alcohol, caffeine, spicy foods, and acidic foods and limiting fluid intake 3-4 hours before bed.       Preventive health issues were discussed with patient, and age appropriate screening tests were ordered as noted in patient's After Visit Summary.    Colonoscopy 4/23 - 5 yrs  Mammo 2/24  Dexa 2/24 - osteopenia  BW 8/23 - annually - order given for Aug 2024    Personalized  health advice and appropriate referrals for health education or preventive services given if needed, as noted in patient's After Visit Summary.     History of Present Illness:     Patient presents for a Medicare Wellness Visit    HPI Pt here for follow up appt and AWV    Pt was seen 2/16/24 for Raynaud's symptoms. She was subsequently started on Amlodipine 2.5 mg 1 tab PO q day for Raynaud symptoms.  She was told to con't her Metoprolol for HTN. She has been checking her BP since starting Amlodipine and they have been a bit lower at home - average home readings 110's/70's - without symptoms of low BP.  She still notes redness and had a blister at tip of R great toe from the Raynauds.  She notes discomfort if you touch the area.  She has f/u with Vascular March 11th 2024.            Patient Care Team:  Sherry Kc DO as PCP - General  DO Jess Khan DO (Dermatology)     Review of Systems:     Review of Systems   Constitutional:  Negative for chills, fever and unexpected weight change.   HENT:  Negative for congestion and trouble swallowing.    Eyes:  Negative for pain and visual disturbance.   Respiratory:  Negative for cough, shortness of breath and wheezing.    Cardiovascular:  Negative for chest pain, palpitations and leg swelling.   Gastrointestinal:  Negative for abdominal pain, blood in stool, constipation, diarrhea, nausea and vomiting.   Genitourinary:  Negative for difficulty urinating, dysuria, vaginal bleeding and vaginal pain.   Musculoskeletal:  Positive for arthralgias. Negative for back pain and neck pain.   Skin:  Positive for color change and wound. Negative for rash.   Neurological:  Negative for dizziness, light-headedness and headaches.   Hematological:  Does not bruise/bleed easily.   Psychiatric/Behavioral:  Negative for confusion and dysphoric mood.         Problem List:     Patient Active Problem List   Diagnosis    Atrophy of vagina    Cataracta    Osteopenia     Macrocytosis    Insomnia    Squamous cell skin cancer    Primary hypertension    Gastroesophageal reflux disease    Esophageal dysphagia    Colon polyp    Raynaud's disease without gangrene      Past Medical and Surgical History:     Past Medical History:   Diagnosis Date    Breast asymmetry     Resolved: Nov 9, 2017    Colon polyp     Family history of colon cancer     Guillain-Manakin Sabot syndrome (HCC)     Menopause ovarian failure 1995    Transaminitis     Last assessed: Mike 3, 2014    Varicose veins with pain, unspecified laterality     Resolved: June 6, 2017    Vasovagal syncope     last assessed: Dec 30, 2013     Past Surgical History:   Procedure Laterality Date    BREAST BIOPSY      unsure laterality or date of biopsy    CATARACT EXTRACTION, BILATERAL Bilateral 2018 january and april    COLONOSCOPY      1/2018: hemorrhoids, no polyps.    2/13/13 - Hemorrhoids, repeat 5 years d/t family h/o colon cancer    EYE SURGERY  2017/2018    Cataracts surgery OU, bilateral iStents    FOOT SURGERY      mortons neuroma    MOUTH SURGERY      Tooth Extraction - last assessed: Aug 19, 2014    MOUTH SURGERY N/A 11/2017    GA AMPUTATION TOE METATARSOPHALANGEAL JOINT Right 10/28/2022    Procedure: AMPUTATION TOE Right Second;  Surgeon: Derrick Mondragon DPM;  Location:  MAIN OR;  Service: Podiatry    SKIN BIOPSY Right 07/2018    right thigh    TONSILLECTOMY      last assessed: Aug 19, 2014      Family History:     Family History   Problem Relation Age of Onset    Colon polyps Mother     Colon cancer Mother         x2: colostomy; ileostomy    Endometrial cancer Mother         60's    Cancer Mother         Endometrial/ hysterectomy; [place]-rectal cancer w/ colostomy; Colon cancer w/ colostomy revision    Heart disease Mother         A Fib    Hearing loss Mother     Vision loss Mother         Macular degeneration    Lung cancer Father 88    Cancer Father         Lung    COPD Father         Long time smoker    Hypertension Sister          new onset 9/22    No Known Problems Maternal Grandmother     No Known Problems Maternal Grandfather     No Known Problems Paternal Grandmother     No Known Problems Paternal Grandfather     Colon cancer Brother 61        Colon cancer    Cancer Brother         Colon cancer    Colon cancer Maternal Aunt         unknown age of onset    No Known Problems Maternal Aunt     No Known Problems Paternal Aunt     No Known Problems Paternal Aunt     No Known Problems Paternal Aunt     No Known Problems Paternal Aunt     No Known Problems Paternal Aunt       Social History:     Social History     Socioeconomic History    Marital status: /Civil Union     Spouse name: None    Number of children: 2    Years of education: None    Highest education level: None   Occupational History    None   Tobacco Use    Smoking status: Never    Smokeless tobacco: Never   Vaping Use    Vaping status: Never Used   Substance and Sexual Activity    Alcohol use: Yes     Alcohol/week: 1.0 standard drink of alcohol     Types: 1 Glasses of wine per week     Comment: 1 per day - social/ drinks wine     Drug use: No    Sexual activity: Yes     Partners: Male     Birth control/protection: Post-menopausal, Male Sterilization   Other Topics Concern    None   Social History Narrative    Always uses seat belt     Caffeine use     [facility]      Social Determinants of Health     Financial Resource Strain: Low Risk  (2/26/2024)    Overall Financial Resource Strain (CARDIA)     Difficulty of Paying Living Expenses: Not hard at all   Food Insecurity: Not on file   Transportation Needs: No Transportation Needs (2/26/2024)    PRAPARE - Transportation     Lack of Transportation (Medical): No     Lack of Transportation (Non-Medical): No   Physical Activity: Not on file   Stress: Not on file   Social Connections: Not on file   Intimate Partner Violence: Not on file   Housing Stability: Not on file      Medications and Allergies:     Current  Outpatient Medications   Medication Sig Dispense Refill    amLODIPine (NORVASC) 5 mg tablet Take 1 tablet (5 mg total) by mouth daily 30 tablet 2    cetirizine (ZyrTEC) 5 MG tablet Take 2.5 mg by mouth daily 1/2 tablet      estradiol (Yuvafem) 10 MCG TABS vaginal tablet Insert 1 tablet (10 mcg total) into the vagina 2 (two) times a week at bedtime 24 tablet 3    famotidine (PEPCID) 40 MG tablet Take 1 tablet (40 mg total) by mouth daily at bedtime 90 tablet 2    latanoprost (XALATAN) 0.005 % ophthalmic solution INSTILL 1 DROP INTO BOTH EYES AT BEDTIME      zolpidem (AMBIEN) 5 mg tablet TAKE 1/2 TO 1 TABLET BY MOUTH DAILY AS NEEDED FOR SLEEP 15 tablet 0    betamethasone, augmented, (DIPROLENE) 0.05 % ointment APPLY TO RASH ON UPPER/ LOWER EXTREMITIES TWICE DAILY X10-14 DAYS (DO NOT USE ON FACE OR BODY FOLDS) (Patient not taking: Reported on 12/29/2023)  1     No current facility-administered medications for this visit.     No Known Allergies   Immunizations:     Immunization History   Administered Date(s) Administered    COVID-19 MODERNA VACC 0.5 ML IM 02/05/2021, 03/03/2021, 11/11/2021, 06/23/2022    COVID-19 Pfizer Vac BIVALENT Roger-sucrose 12 Yr+ IM 01/06/2023    COVID-19 Pfizer mRNA vacc PF roger-sucrose 12 yr and older (Comirnaty) 09/19/2023    COVID-19, unspecified 06/23/2022    H1N1, All Formulations 11/16/2009    INFLUENZA 11/17/2017, 11/12/2018, 10/01/2022, 10/07/2022, 11/07/2023    Influenza Split High Dose Preservative Free IM 11/17/2017    Influenza, high dose seasonal 0.7 mL 11/12/2018, 11/20/2019, 10/14/2020, 09/24/2021    Pneumococcal Conjugate 13-Valent 12/17/2019    Pneumococcal Polysaccharide PPV23 01/19/2021    Tdap 01/30/2023, 01/30/2023      Health Maintenance:         Topic Date Due    Breast Cancer Screening: Mammogram  02/15/2025    DXA SCAN  02/15/2026    Colorectal Cancer Screening  04/25/2028    Hepatitis C Screening  Completed         Topic Date Due    COVID-19 Vaccine (8 - 2023-24 season)  11/14/2023      Medicare Screening Tests and Risk Assessments:     Luisa is here for her Subsequent Wellness visit. Last Medicare Wellness visit information reviewed, patient interviewed and updates made to the record today.      Health Risk Assessment:   Patient rates overall health as very good. Patient feels that their physical health rating is slightly worse. Patient is very satisfied with their life. Eyesight was rated as same. Hearing was rated as same. Patient feels that their emotional and mental health rating is same. Patients states they are never, rarely angry. Patient states they are never, rarely unusually tired/fatigued. Pain experienced in the last 7 days has been some. Patient's pain rating has been 6/10. Patient states that she has experienced no weight loss or gain in last 6 months. Physical health worse with worsening Raynauds    Depression Screening:   PHQ-2 Score: 0      Fall Risk Screening:   In the past year, patient has experienced: no history of falling in past year      Urinary Incontinence Screening:   Patient has leaked urine accidently in the last six months. Leakage without awareness wears pads     Home Safety:  Patient does not have trouble with stairs inside or outside of their home. Patient has working smoke alarms and has working carbon monoxide detector. Home safety hazards include: none.     Nutrition:   Current diet is Limited junk food and Regular.     Medications:   Patient is currently taking over-the-counter supplements. OTC medications include: see medication list. Patient is able to manage medications.     Activities of Daily Living (ADLs)/Instrumental Activities of Daily Living (IADLs):   Walk and transfer into and out of bed and chair?: Yes  Dress and groom yourself?: Yes    Bathe or shower yourself?: Yes    Feed yourself? Yes  Do your laundry/housekeeping?: Yes  Manage your money, pay your bills and track your expenses?: Yes  Make your own meals?: Yes    Do your own  shopping?: Yes    Previous Hospitalizations:   Any hospitalizations or ED visits within the last 12 months?: No      Advance Care Planning:   Living will: Yes    Advanced directive: Yes      Cognitive Screening:   Provider or family/friend/caregiver concerned regarding cognition?: No    PREVENTIVE SCREENINGS      Cardiovascular Screening:    General: Screening Current and Risks and Benefits Discussed      Diabetes Screening:     General: Screening Current and Risks and Benefits Discussed      Colorectal Cancer Screening:     General: Screening Current      Breast Cancer Screening:     General: Screening Current      Cervical Cancer Screening:    General: Screening Not Indicated and Risks and Benefits Discussed      Osteoporosis Screening:    General: Screening Current and Risks and Benefits Discussed      Abdominal Aortic Aneurysm (AAA) Screening:        General: Risks and Benefits Discussed and Screening Not Indicated      Lung Cancer Screening:     General: Screening Not Indicated and Risks and Benefits Discussed      Hepatitis C Screening:    General: Screening Current and Risks and Benefits Discussed    Screening, Brief Intervention, and Referral to Treatment (SBIRT)    Screening      AUDIT-C Screenin) How often did you have a drink containing alcohol in the past year? 2 to 3 times a week  2) How many drinks did you have on a typical day when you were drinking in the past year? 1 to 2  3) How often did you have 6 or more drinks on one occasion in the past year? never    AUDIT-C Score: 3  Interpretation: Score 3-12 (female): POSITIVE screen for alcohol misuse    AUDIT Screenin) How often during the last year have you found that you were not able to stop drinking once you had started? 0 - never  5) How often during the last year have you failed to do what was normally expected from you because of drinking? 0 - never  6) How often during the last year have you needed a first drink in the morning to get  "yourself going after a heavy drinking session? 0 - never  7) How often during the last year have you had a feeling of guilt or remorse after drinking? 0 - never  8) How often during the last year have you been unable to remember what happened the night before because you had been drinking? 0 - never  9) Have you or someone else been injured as a result of your drinking? 0 - no  10) Has a relative or friend or a doctor or another health worker been concerned about your drinking or suggested you cut down? 0 - no    AUDIT Score: 3  Interpretation: Low risk alcohol consumption    Single Item Drug Screening:  How often have you used an illegal drug (including marijuana) or a prescription medication for non-medical reasons in the past year? never    Single Item Drug Screen Score: 0  Interpretation: Negative screen for possible drug use disorder    Other Counseling Topics:   Car/seat belt/driving safety and regular weightbearing exercise and calcium and vitamin D intake.     No results found.     Physical Exam:     /72   Ht 5' 2.25\" (1.581 m)   Wt 57.2 kg (126 lb)   LMP  (LMP Unknown)   BMI 22.86 kg/m²     Physical Exam  Vitals and nursing note reviewed.   Constitutional:       General: She is not in acute distress.     Appearance: She is well-developed. She is not ill-appearing.   HENT:      Head: Normocephalic and atraumatic.      Right Ear: Tympanic membrane and external ear normal. There is no impacted cerumen.      Left Ear: Tympanic membrane and external ear normal. There is no impacted cerumen.      Mouth/Throat:      Mouth: Mucous membranes are moist.      Pharynx: Oropharynx is clear. No oropharyngeal exudate.   Eyes:      General:         Right eye: No discharge.         Left eye: No discharge.      Conjunctiva/sclera: Conjunctivae normal.   Neck:      Thyroid: No thyromegaly.      Vascular: No carotid bruit.      Trachea: No tracheal deviation.   Cardiovascular:      Rate and Rhythm: Normal rate and " regular rhythm.      Heart sounds: Normal heart sounds. No murmur heard.  Pulmonary:      Effort: Pulmonary effort is normal. No respiratory distress.      Breath sounds: Normal breath sounds. No wheezing, rhonchi or rales.   Abdominal:      General: There is no distension.      Palpations: Abdomen is soft.      Tenderness: There is no abdominal tenderness. There is no guarding or rebound.   Musculoskeletal:         General: No deformity or signs of injury.      Cervical back: Neck supple.   Lymphadenopathy:      Cervical: No cervical adenopathy.   Skin:     General: Skin is warm and dry.      Coloration: Skin is not pale.      Findings: No bruising.      Comments: Dry skin on hands with mild erythema, toes B/L feet red then purple, great toes B/L with resolving superficial ulceration   Neurological:      General: No focal deficit present.      Mental Status: She is alert. Mental status is at baseline.      Motor: No abnormal muscle tone.      Gait: Gait normal.   Psychiatric:         Mood and Affect: Mood normal.         Behavior: Behavior normal.         Thought Content: Thought content normal.         Judgment: Judgment normal.          Sherry Kc DO

## 2024-02-26 NOTE — PATIENT INSTRUCTIONS
Medicare Preventive Visit Patient Instructions  Thank you for completing your Welcome to Medicare Visit or Medicare Annual Wellness Visit today. Your next wellness visit will be due in one year (2/26/2025).  The screening/preventive services that you may require over the next 5-10 years are detailed below. Some tests may not apply to you based off risk factors and/or age. Screening tests ordered at today's visit but not completed yet may show as past due. Also, please note that scanned in results may not display below.  Preventive Screenings:  Service Recommendations Previous Testing/Comments   Colorectal Cancer Screening  * Colonoscopy    * Fecal Occult Blood Test (FOBT)/Fecal Immunochemical Test (FIT)  * Fecal DNA/Cologuard Test  * Flexible Sigmoidoscopy Age: 45-75 years old   Colonoscopy: every 10 years (may be performed more frequently if at higher risk)  OR  FOBT/FIT: every 1 year  OR  Cologuard: every 3 years  OR  Sigmoidoscopy: every 5 years  Screening may be recommended earlier than age 45 if at higher risk for colorectal cancer. Also, an individualized decision between you and your healthcare provider will decide whether screening between the ages of 76-85 would be appropriate. Colonoscopy: 04/25/2023  FOBT/FIT: Not on file  Cologuard: Not on file  Sigmoidoscopy: Not on file    Screening Current     Breast Cancer Screening Age: 40+ years old  Frequency: every 1-2 years  Not required if history of left and right mastectomy Mammogram: 02/15/2024    Screening Current   Cervical Cancer Screening Between the ages of 21-29, pap smear recommended once every 3 years.   Between the ages of 30-65, can perform pap smear with HPV co-testing every 5 years.   Recommendations may differ for women with a history of total hysterectomy, cervical cancer, or abnormal pap smears in past. Pap Smear: 02/13/2023    Screening Not Indicated  Risks and Benefits Discussed   Hepatitis C Screening Once for adults born between 1945 and  1965  More frequently in patients at high risk for Hepatitis C Hep C Antibody: 01/07/2020    Screening Current  Risks and Benefits Discussed   Diabetes Screening 1-2 times per year if you're at risk for diabetes or have pre-diabetes Fasting glucose: 82 mg/dL (4/13/2023)  A1C: No results in last 5 years (No results in last 5 years)  Screening Current  Risks and Benefits Discussed   Cholesterol Screening Once every 5 years if you don't have a lipid disorder. May order more often based on risk factors. Lipid panel: 04/13/2023    Screening Current  Risks and Benefits Discussed     Other Preventive Screenings Covered by Medicare:  Abdominal Aortic Aneurysm (AAA) Screening: covered once if your at risk. You're considered to be at risk if you have a family history of AAA.  Lung Cancer Screening: covers low dose CT scan once per year if you meet all of the following conditions: (1) Age 55-77; (2) No signs or symptoms of lung cancer; (3) Current smoker or have quit smoking within the last 15 years; (4) You have a tobacco smoking history of at least 20 pack years (packs per day multiplied by number of years you smoked); (5) You get a written order from a healthcare provider.  Glaucoma Screening: covered annually if you're considered high risk: (1) You have diabetes OR (2) Family history of glaucoma OR (3)  aged 50 and older OR (4)  American aged 65 and older  Osteoporosis Screening: covered every 2 years if you meet one of the following conditions: (1) You're estrogen deficient and at risk for osteoporosis based off medical history and other findings; (2) Have a vertebral abnormality; (3) On glucocorticoid therapy for more than 3 months; (4) Have primary hyperparathyroidism; (5) On osteoporosis medications and need to assess response to drug therapy.   Last bone density test (DXA Scan): 02/15/2024.  HIV Screening: covered annually if you're between the age of 15-65. Also covered annually if you are  younger than 15 and older than 65 with risk factors for HIV infection. For pregnant patients, it is covered up to 3 times per pregnancy.    Immunizations:  Immunization Recommendations   Influenza Vaccine Annual influenza vaccination during flu season is recommended for all persons aged >= 6 months who do not have contraindications   Pneumococcal Vaccine   * Pneumococcal conjugate vaccine = PCV13 (Prevnar 13), PCV15 (Vaxneuvance), PCV20 (Prevnar 20)  * Pneumococcal polysaccharide vaccine = PPSV23 (Pneumovax) Adults 19-63 yo with certain risk factors or if 65+ yo  If never received any pneumonia vaccine: recommend Prevnar 20 (PCV20)  Give PCV20 if previously received 1 dose of PCV13 or PPSV23   Hepatitis B Vaccine 3 dose series if at intermediate or high risk (ex: diabetes, end stage renal disease, liver disease)   Respiratory syncytial virus (RSV) Vaccine - COVERED BY MEDICARE PART D  * RSVPreF3 (Arexvy) CDC recommends that adults 60 years of age and older may receive a single dose of RSV vaccine using shared clinical decision-making (SCDM)   Tetanus (Td) Vaccine - COST NOT COVERED BY MEDICARE PART B Following completion of primary series, a booster dose should be given every 10 years to maintain immunity against tetanus. Td may also be given as tetanus wound prophylaxis.   Tdap Vaccine - COST NOT COVERED BY MEDICARE PART B Recommended at least once for all adults. For pregnant patients, recommended with each pregnancy.   Shingles Vaccine (Shingrix) - COST NOT COVERED BY MEDICARE PART B  2 shot series recommended in those 19 years and older who have or will have weakened immune systems or those 50 years and older     Health Maintenance Due:      Topic Date Due   • Breast Cancer Screening: Mammogram  02/15/2025   • DXA SCAN  02/15/2026   • Colorectal Cancer Screening  04/25/2028   • Hepatitis C Screening  Completed     Immunizations Due:      Topic Date Due   • COVID-19 Vaccine (8 - 2023-24 season) 11/14/2023      Advance Directives   What are advance directives?  Advance directives are legal documents that state your wishes and plans for medical care. These plans are made ahead of time in case you lose your ability to make decisions for yourself. Advance directives can apply to any medical decision, such as the treatments you want, and if you want to donate organs.   What are the types of advance directives?  There are many types of advance directives, and each state has rules about how to use them. You may choose a combination of any of the following:  Living will:  This is a written record of the treatment you want. You can also choose which treatments you do not want, which to limit, and which to stop at a certain time. This includes surgery, medicine, IV fluid, and tube feedings.   Durable power of  for healthcare (DPAHC):  This is a written record that states who you want to make healthcare choices for you when you are unable to make them for yourself. This person, called a proxy, is usually a family member or a friend. You may choose more than 1 proxy.  Do not resuscitate (DNR) order:  A DNR order is used in case your heart stops beating or you stop breathing. It is a request not to have certain forms of treatment, such as CPR. A DNR order may be included in other types of advance directives.  Medical directive:  This covers the care that you want if you are in a coma, near death, or unable to make decisions for yourself. You can list the treatments you want for each condition. Treatment may include pain medicine, surgery, blood transfusions, dialysis, IV or tube feedings, and a ventilator (breathing machine).  Values history:  This document has questions about your views, beliefs, and how you feel and think about life. This information can help others choose the care that you would choose.  Why are advance directives important?  An advance directive helps you control your care. Although spoken wishes may  be used, it is better to have your wishes written down. Spoken wishes can be misunderstood, or not followed. Treatments may be given even if you do not want them. An advance directive may make it easier for your family to make difficult choices about your care.   Urinary Incontinence   Urinary incontinence (UI)  is when you lose control of your bladder. UI develops because your bladder cannot store or empty urine properly. The 3 most common types of UI are stress incontinence, urge incontinence, or both.  Medicines:   May be given to help strengthen your bladder control. Report any side effects of medication to your healthcare provider.  Do pelvic muscle exercises often:  Your pelvic muscles help you stop urinating. Squeeze these muscles tight for 5 seconds, then relax for 5 seconds. Gradually work up to squeezing for 10 seconds. Do 3 sets of 15 repetitions a day, or as directed. This will help strengthen your pelvic muscles and improve bladder control.  Train your bladder:  Go to the bathroom at set times, such as every 2 hours, even if you do not feel the urge to go. You can also try to hold your urine when you feel the urge to go. For example, hold your urine for 5 minutes when you feel the urge to go. As that becomes easier, hold your urine for 10 minutes.   Self-care:   Keep a UI record.  Write down how often you leak urine and how much you leak. Make a note of what you were doing when you leaked urine.  Drink liquids as directed. You may need to limit the amount of liquid you drink to help control your urine leakage. Do not drink any liquid right before you go to bed. Limit or do not have drinks that contain caffeine or alcohol.   Prevent constipation.  Eat a variety of high-fiber foods. Good examples are high-fiber cereals, beans, vegetables, and whole-grain breads. Walking is the best way to trigger your intestines to have a bowel movement.  Exercise regularly and maintain a healthy weight.  Weight loss and  "exercise will decrease pressure on your bladder and help you control your leakage.   Use a catheter as directed  to help empty your bladder. A catheter is a tiny, plastic tube that is put into your bladder to drain your urine.   Go to behavior therapy as directed.  Behavior therapy may be used to help you learn to control your urge to urinate.    Alcohol Use and Your Health    Drinking too much can harm your health.  Excessive alcohol use leads to about 88,000 death in the [place] each year, and shortens the life of those who diet by almost 30 years.  Further, excessive drinking cost the economy $249 billion in 2010.  Most excessive drinkers are not alcohol dependent.    Excessive alcohol use has immediate effects that increase the risk of many harmful health conditions.  These are most often the result of binge drinking.  Over time, excessive alcohol use can lead to the development of chronic diseases and other series health problems.    What is considered a \"drink\"?        Excessive alcohol use includes:  Binge Drinking: For women, 4 or more drinks consumed on one occasion. For men, 5 or more drinks consumed on one occasion.  Heavy Drinking: For women, 8 or more drinks per week. For men, 15 or more drinks per week  Any alcohol used by pregnant women  Any alcohol used by those under the age of 21 years    If you choose to drink, do so in moderation:  Do not drink at all if you are under the age of 21, or if you are or may be pregnant, or have health problems that could be made worse by drinking.  For women, up to 1 drink per day  For men, up to 2 drinks a day    No one should begin drinking or drink more frequently based on potential health benefits    Short-Term Health Risks:  Injuries: motor vehicle crashes, falls, drownings, burns  Violence: homicide, suicide, sexual assault, intimate partner violence  Alcohol poisoning  Reproductive health: risky sexual behaviors, unintended prengnacy, sexually " transmitted diseases, miscarriage, stillbirth, fetal alcohol syndrome    Long-Term Health Risks:  Chronic diseases: high blood pressure, heart disease, stroke, liver disease, digestive problems  Cancers: breast, mouth and throat, liver, colon  Learning and memory problems: dementia, poor school performance  Mental health: depression, anxiety, insomnia  Social problems: lost productivity, family problems, unemployment  Alcohol dependence    For support and more information:  Substance Abuse and Mental Health Services Administration  [address]  [place] [address]  Web Address: http://www.[facility].gov    Alcoholics Anonymous        Web Address: http://www.aa.org    https://www.cdc.gov/alcohol/fact-sheets/alcohol-use.htm     © Copyright [employer] 2018 Information is for End User's use only and may not be sold, redistributed or otherwise used for commercial purposes. All illustrations and images included in CareNotes® are the copyrighted property of [employer] or [employer]      Medicare Preventive Visit Patient Instructions  Thank you for completing your Welcome to Medicare Visit or Medicare Annual Wellness Visit today. Your next wellness visit will be due in one year (2/26/2025).  The screening/preventive services that you may require over the next 5-10 years are detailed below. Some tests may not apply to you based off risk factors and/or age. Screening tests ordered at today's visit but not completed yet may show as past due. Also, please note that scanned in results may not display below.  Preventive Screenings:  Service Recommendations Previous Testing/Comments   Colorectal Cancer Screening  * Colonoscopy    * Fecal Occult Blood Test (FOBT)/Fecal Immunochemical Test (FIT)  * Fecal DNA/Cologuard Test  * Flexible Sigmoidoscopy Age: 45-75 years old   Colonoscopy: every 10 years (may be performed more frequently if at higher risk)  OR  FOBT/FIT: every 1 year  OR  Cologuard: every 3 years   OR  Sigmoidoscopy: every 5 years  Screening may be recommended earlier than age 45 if at higher risk for colorectal cancer. Also, an individualized decision between you and your healthcare provider will decide whether screening between the ages of 76-85 would be appropriate. Colonoscopy: 04/25/2023  FOBT/FIT: Not on file  Cologuard: Not on file  Sigmoidoscopy: Not on file    Screening Current     Breast Cancer Screening Age: 40+ years old  Frequency: every 1-2 years  Not required if history of left and right mastectomy Mammogram: 02/15/2024    Screening Current   Cervical Cancer Screening Between the ages of 21-29, pap smear recommended once every 3 years.   Between the ages of 30-65, can perform pap smear with HPV co-testing every 5 years.   Recommendations may differ for women with a history of total hysterectomy, cervical cancer, or abnormal pap smears in past. Pap Smear: 02/13/2023    Screening Not Indicated  Risks and Benefits Discussed   Hepatitis C Screening Once for adults born between 1945 and 1965  More frequently in patients at high risk for Hepatitis C Hep C Antibody: 01/07/2020    Screening Current  Risks and Benefits Discussed   Diabetes Screening 1-2 times per year if you're at risk for diabetes or have pre-diabetes Fasting glucose: 82 mg/dL (4/13/2023)  A1C: No results in last 5 years (No results in last 5 years)  Screening Current  Risks and Benefits Discussed   Cholesterol Screening Once every 5 years if you don't have a lipid disorder. May order more often based on risk factors. Lipid panel: 04/13/2023    Screening Current  Risks and Benefits Discussed     Other Preventive Screenings Covered by Medicare:  Abdominal Aortic Aneurysm (AAA) Screening: covered once if your at risk. You're considered to be at risk if you have a family history of AAA.  Lung Cancer Screening: covers low dose CT scan once per year if you meet all of the following conditions: (1) Age 55-77; (2) No signs or symptoms of lung  cancer; (3) Current smoker or have quit smoking within the last 15 years; (4) You have a tobacco smoking history of at least 20 pack years (packs per day multiplied by number of years you smoked); (5) You get a written order from a healthcare provider.  Glaucoma Screening: covered annually if you're considered high risk: (1) You have diabetes OR (2) Family history of glaucoma OR (3)  aged 50 and older OR (4)  American aged 65 and older  Osteoporosis Screening: covered every 2 years if you meet one of the following conditions: (1) You're estrogen deficient and at risk for osteoporosis based off medical history and other findings; (2) Have a vertebral abnormality; (3) On glucocorticoid therapy for more than 3 months; (4) Have primary hyperparathyroidism; (5) On osteoporosis medications and need to assess response to drug therapy.   Last bone density test (DXA Scan): 02/15/2024.  HIV Screening: covered annually if you're between the age of 15-65. Also covered annually if you are younger than 15 and older than 65 with risk factors for HIV infection. For pregnant patients, it is covered up to 3 times per pregnancy.    Immunizations:  Immunization Recommendations   Influenza Vaccine Annual influenza vaccination during flu season is recommended for all persons aged >= 6 months who do not have contraindications   Pneumococcal Vaccine   * Pneumococcal conjugate vaccine = PCV13 (Prevnar 13), PCV15 (Vaxneuvance), PCV20 (Prevnar 20)  * Pneumococcal polysaccharide vaccine = PPSV23 (Pneumovax) Adults 19-65 yo with certain risk factors or if 65+ yo  If never received any pneumonia vaccine: recommend Prevnar 20 (PCV20)  Give PCV20 if previously received 1 dose of PCV13 or PPSV23   Hepatitis B Vaccine 3 dose series if at intermediate or high risk (ex: diabetes, end stage renal disease, liver disease)   Respiratory syncytial virus (RSV) Vaccine - COVERED BY MEDICARE PART D  * RSVPreF3 (Arexvy) CDC recommends  that adults 60 years of age and older may receive a single dose of RSV vaccine using shared clinical decision-making (SCDM)   Tetanus (Td) Vaccine - COST NOT COVERED BY MEDICARE PART B Following completion of primary series, a booster dose should be given every 10 years to maintain immunity against tetanus. Td may also be given as tetanus wound prophylaxis.   Tdap Vaccine - COST NOT COVERED BY MEDICARE PART B Recommended at least once for all adults. For pregnant patients, recommended with each pregnancy.   Shingles Vaccine (Shingrix) - COST NOT COVERED BY MEDICARE PART B  2 shot series recommended in those 19 years and older who have or will have weakened immune systems or those 50 years and older     Health Maintenance Due:      Topic Date Due   • Breast Cancer Screening: Mammogram  02/15/2025   • DXA SCAN  02/15/2026   • Colorectal Cancer Screening  04/25/2028   • Hepatitis C Screening  Completed     Immunizations Due:      Topic Date Due   • COVID-19 Vaccine (8 - 2023-24 season) 11/14/2023     Advance Directives   What are advance directives?  Advance directives are legal documents that state your wishes and plans for medical care. These plans are made ahead of time in case you lose your ability to make decisions for yourself. Advance directives can apply to any medical decision, such as the treatments you want, and if you want to donate organs.   What are the types of advance directives?  There are many types of advance directives, and each state has rules about how to use them. You may choose a combination of any of the following:  Living will:  This is a written record of the treatment you want. You can also choose which treatments you do not want, which to limit, and which to stop at a certain time. This includes surgery, medicine, IV fluid, and tube feedings.   Durable power of  for healthcare (DPAHC):  This is a written record that states who you want to make healthcare choices for you when you are  unable to make them for yourself. This person, called a proxy, is usually a family member or a friend. You may choose more than 1 proxy.  Do not resuscitate (DNR) order:  A DNR order is used in case your heart stops beating or you stop breathing. It is a request not to have certain forms of treatment, such as CPR. A DNR order may be included in other types of advance directives.  Medical directive:  This covers the care that you want if you are in a coma, near death, or unable to make decisions for yourself. You can list the treatments you want for each condition. Treatment may include pain medicine, surgery, blood transfusions, dialysis, IV or tube feedings, and a ventilator (breathing machine).  Values history:  This document has questions about your views, beliefs, and how you feel and think about life. This information can help others choose the care that you would choose.  Why are advance directives important?  An advance directive helps you control your care. Although spoken wishes may be used, it is better to have your wishes written down. Spoken wishes can be misunderstood, or not followed. Treatments may be given even if you do not want them. An advance directive may make it easier for your family to make difficult choices about your care.   Urinary Incontinence   Urinary incontinence (UI)  is when you lose control of your bladder. UI develops because your bladder cannot store or empty urine properly. The 3 most common types of UI are stress incontinence, urge incontinence, or both.  Medicines:   May be given to help strengthen your bladder control. Report any side effects of medication to your healthcare provider.  Do pelvic muscle exercises often:  Your pelvic muscles help you stop urinating. Squeeze these muscles tight for 5 seconds, then relax for 5 seconds. Gradually work up to squeezing for 10 seconds. Do 3 sets of 15 repetitions a day, or as directed. This will help strengthen your pelvic muscles and  improve bladder control.  Train your bladder:  Go to the bathroom at set times, such as every 2 hours, even if you do not feel the urge to go. You can also try to hold your urine when you feel the urge to go. For example, hold your urine for 5 minutes when you feel the urge to go. As that becomes easier, hold your urine for 10 minutes.   Self-care:   Keep a UI record.  Write down how often you leak urine and how much you leak. Make a note of what you were doing when you leaked urine.  Drink liquids as directed. You may need to limit the amount of liquid you drink to help control your urine leakage. Do not drink any liquid right before you go to bed. Limit or do not have drinks that contain caffeine or alcohol.   Prevent constipation.  Eat a variety of high-fiber foods. Good examples are high-fiber cereals, beans, vegetables, and whole-grain breads. Walking is the best way to trigger your intestines to have a bowel movement.  Exercise regularly and maintain a healthy weight.  Weight loss and exercise will decrease pressure on your bladder and help you control your leakage.   Use a catheter as directed  to help empty your bladder. A catheter is a tiny, plastic tube that is put into your bladder to drain your urine.   Go to behavior therapy as directed.  Behavior therapy may be used to help you learn to control your urge to urinate.    Alcohol Use and Your Health    Drinking too much can harm your health.  Excessive alcohol use leads to about 88,000 death in the [place] each year, and shortens the life of those who diet by almost 30 years.  Further, excessive drinking cost the economy $249 billion in 2010.  Most excessive drinkers are not alcohol dependent.    Excessive alcohol use has immediate effects that increase the risk of many harmful health conditions.  These are most often the result of binge drinking.  Over time, excessive alcohol use can lead to the development of chronic diseases and other series  "health problems.    What is considered a \"drink\"?        Excessive alcohol use includes:  Binge Drinking: For women, 4 or more drinks consumed on one occasion. For men, 5 or more drinks consumed on one occasion.  Heavy Drinking: For women, 8 or more drinks per week. For men, 15 or more drinks per week  Any alcohol used by pregnant women  Any alcohol used by those under the age of 21 years    If you choose to drink, do so in moderation:  Do not drink at all if you are under the age of 21, or if you are or may be pregnant, or have health problems that could be made worse by drinking.  For women, up to 1 drink per day  For men, up to 2 drinks a day    No one should begin drinking or drink more frequently based on potential health benefits    Short-Term Health Risks:  Injuries: motor vehicle crashes, falls, drownings, burns  Violence: homicide, suicide, sexual assault, intimate partner violence  Alcohol poisoning  Reproductive health: risky sexual behaviors, unintended prengnacy, sexually transmitted diseases, miscarriage, stillbirth, fetal alcohol syndrome    Long-Term Health Risks:  Chronic diseases: high blood pressure, heart disease, stroke, liver disease, digestive problems  Cancers: breast, mouth and throat, liver, colon  Learning and memory problems: dementia, poor school performance  Mental health: depression, anxiety, insomnia  Social problems: lost productivity, family problems, unemployment  Alcohol dependence    For support and more information:  Substance Abuse and Mental Health Services Administration  [address]  [place] [address]  Web Address: http://www.samhsa.gov    Alcoholics Anonymous        Web Address: http://www.aa.org    https://www.cdc.gov/alcohol/fact-sheets/alcohol-use.htm     © Copyright [employer] 2018 Information is for End User's use only and may not be sold, redistributed or otherwise used for commercial purposes. All illustrations and images included in CareNotes® are the " copyrighted property of [email or web address] or [employer]

## 2024-02-26 NOTE — ASSESSMENT & PLAN NOTE
Increase Amlodipine from 2.5 mg to 5 mg for Raynauds symptoms - stop Metoprolol d/t low nml BP (asymptomatic), recheck BP in 6-8 wks, call with symptoms of low BP/low BP readings at home/SE with increase in Amlodipine (had LE edema when on 5 mg in past)

## 2024-02-26 NOTE — ASSESSMENT & PLAN NOTE
No great benefit with low dose Amlodipine - will increase from 2.5 mg to 5 mg and watch closely as rx was stopped last time at this dose d/t LE edema, will stop Metoprolol d/t low nml BP,  has appt with vascular in 2.5 wks, will follow but pt was instructed to call with new/worse symptoms/ulcerations

## 2024-02-28 DIAGNOSIS — F51.01 PRIMARY INSOMNIA: ICD-10-CM

## 2024-02-28 RX ORDER — ZOLPIDEM TARTRATE 5 MG/1
TABLET ORAL
Qty: 15 TABLET | Refills: 0 | Status: SHIPPED | OUTPATIENT
Start: 2024-02-28

## 2024-03-11 ENCOUNTER — CONSULT (OUTPATIENT)
Dept: VASCULAR SURGERY | Facility: CLINIC | Age: 79
End: 2024-03-11
Payer: MEDICARE

## 2024-03-11 VITALS
OXYGEN SATURATION: 100 % | HEIGHT: 62 IN | WEIGHT: 125 LBS | DIASTOLIC BLOOD PRESSURE: 64 MMHG | SYSTOLIC BLOOD PRESSURE: 108 MMHG | HEART RATE: 61 BPM | BODY MASS INDEX: 23 KG/M2

## 2024-03-11 DIAGNOSIS — I73.00 RAYNAUD'S DISEASE WITHOUT GANGRENE: Primary | ICD-10-CM

## 2024-03-11 PROCEDURE — 99203 OFFICE O/P NEW LOW 30 MIN: CPT | Performed by: SURGERY

## 2024-03-11 NOTE — LETTER
March 11, 2024     Sherry Kc DO  [facility] [address] [address]    Patient: Luisa Granados   YOB: 1945   Date of Visit: 3/11/2024       Dear Dr. Kc:    Thank you for referring Luisa Granados to me for evaluation. Below are the relevant portions of my assessment and plan of care.    Diagnoses and all orders for this visit:  Raynaud's disease without gangrene  Bilateral sensitivity of the fingers and toes with color change to cold with history of tissue loss/infection in the winter which is now healed since starting calcium channel blocker by her PCP. She has no claudication symptoms, rest pain or tissue loss at this time. 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     LEAD - No evidence of significant lower extremity arterial occlusive disease bilaterally, right ARTEMIO 1.06/MTP37/GTP43; Left ARTEMIO 1.16/MTP52/GTP68  Palpable 2+ DP pulses bilaterally     -Discussed the pathophysiology of Raynaud's phenomenon, which is most commonly idiopathic and a clinical diagnosis.  -LEAD demonstrates no significant arterial insufficiency and she has palpable pedal pulses bilaterally.  -No known connective tissue disorder or evidence of arterial insufficiency suggesting that she has an underlying disease process causing secondary Raynaud's. However, recommend referral to rheumatology for further work-up.  -Treatment is mostly conservative and preventative to reduce frequency and duration of attacks.   -Recommend simple measures to maintain warmth and avoid cold such as wearing gloves and thick socks, avoid nicotine products and vasoconstricting medications. Continue pharmacologic therapy with calcium channel blocker. Symptoms appear to be well controlled at this time.  -Follow-up as needed.           If you have questions, please do not hesitate to call me. I look forward to following Luisa along with you.         Sincerely,        Eloisa Weems MD        CC: No Recipients

## 2024-03-11 NOTE — PATIENT INSTRUCTIONS
Raynaud's disease without gangrene  Bilateral sensitivity of the fingers and toes with color change to cold with history of tissue loss/infection in the winter which is now healed since starting calcium channel blocker by her PCP. She has no claudication symptoms, rest pain or tissue loss at this time. 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     LEAD - No evidence of significant lower extremity arterial occlusive disease bilaterally, right ARTEMIO 1.06/MTP37/GTP43; Left ARTEMIO 1.16/MTP52/GTP68  Palpable 2+ DP pulses bilaterally     -Discussed the pathophysiology of Raynaud's phenomenon, which is most commonly idiopathic and a clinical diagnosis.  -LEAD demonstrates no significant arterial insufficiency and she has palpable pedal pulses bilaterally.  -No known connective tissue disorder or evidence of arterial insufficiency suggesting that she has an underlying disease process causing secondary Raynaud's. However, recommend referral to rheumatology for further work-up.  -Treatment is mostly conservative and preventative to reduce frequency and duration of attacks.   -Recommend simple measures to maintain warmth and avoid cold such as wearing gloves and thick socks, avoid nicotine products and vasoconstricting medications. Continue pharmacologic therapy with calcium channel blocker. Symptoms appear to be well controlled at this time.  -Follow-up as needed.

## 2024-03-11 NOTE — ASSESSMENT & PLAN NOTE
Bilateral sensitivity of the fingers and toes with color change to cold with history of tissue loss/infection of the toes in the winter which is now healed since starting calcium channel blocker by her PCP. She has no claudication symptoms, rest pain or tissue loss at this time. 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    LEAD - No evidence of significant lower extremity arterial occlusive disease bilaterally, right ARTEMIO 1.06/MTP37/GTP43; Left ARTEMIO 1.16/MTP52/GTP68  Palpable 2+ DP pulses bilaterally    -Discussed the pathophysiology of Raynaud's phenomenon, which is most commonly idiopathic and a clinical diagnosis.  -LEAD demonstrates no significant arterial insufficiency and she has palpable pedal pulses bilaterally.  -No known connective tissue disorder or evidence of arterial insufficiency suggesting that she has an underlying disease process causing secondary Raynaud's. However, recommend referral to rheumatology for further work-up.  -Treatment is mostly conservative and preventative to reduce frequency and duration of attacks.   -Recommend simple measures to maintain warmth and avoid cold such as wearing gloves and thick socks, avoid nicotine products and vasoconstricting medications. Continue pharmacologic therapy with calcium channel blocker. Symptoms appear to be well controlled at this time.  -Follow-up as needed.

## 2024-03-11 NOTE — PROGRESS NOTES
Assessment/Plan:    Raynaud's disease without gangrene  Bilateral sensitivity of the fingers and toes with color change to cold with history of tissue loss/infection in the winter which is now healed since starting calcium channel blocker by her PCP. She has no claudication symptoms, rest pain or tissue loss at this time. 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    LEAD - No evidence of significant lower extremity arterial occlusive disease bilaterally, right ARTEMIO 1.06/MTP37/GTP43; Left ARTEMIO 1.16/MTP52/GTP68  Palpable 2+ DP pulses bilaterally    -Discussed the pathophysiology of Raynaud's phenomenon, which is most commonly idiopathic and a clinical diagnosis.  -LEAD demonstrates no significant arterial insufficiency and she has palpable pedal pulses bilaterally.  -No known connective tissue disorder or evidence of arterial insufficiency suggesting that she has an underlying disease process causing secondary Raynaud's. However, recommend referral to rheumatology for further work-up.  -Treatment is mostly conservative and preventative to reduce frequency and duration of attacks.   -Recommend simple measures to maintain warmth and avoid cold such as wearing gloves and thick socks, avoid nicotine products and vasoconstricting medications. Continue pharmacologic therapy with calcium channel blocker. Symptoms appear to be well controlled at this time.  -Follow-up as needed.       Diagnoses and all orders for this visit:    Raynaud's disease without gangrene  -     Ambulatory Referral to Rheumatology; Future        I have spent 30 minutes with Patient  today in which greater than 50% of this time was spent in counseling/coordination of care regarding Intructions for management, Importance of tx compliance and Impressions.    Subjective:      Patient ID: Luisa Granados is a 79 y.o. female.    Patient is new to our office. She was referred here by Podiatry, Dr. Castellanos. Patient had a MEAGAN done 1/31/2024.   "Patient c/o coldness in her toes and fingers. She has been told that she has Reynaud's syndrome. She is worried because this past December her toes got cold, red, sore and infected. Her medication was recently changed to Amlodipine 5 mg. She has notice a difference in the redness and the soreness since the medication change. Patient is s/p a right second toe amputation done 10/28/2022. She is a non-smoker.     HPI  Ms. Granados is a 78yo female nonsmoker with known history of Raynaud's phenomenon with progression to tissue loss/infection over the winter who presents as a new referral. She was transitioned to a calcium channel blocker from a beta blocker with significant improvement in her symptoms and healing of her wounds though she now experiences significant sinus tachycardia at times. She has cold sensitivity in both hands and feet which is not as painful now on amlodipine. She is compliant with conservative measures in maintaining warmth and keeping her hands and feet covered.    The following portions of the patient's history were reviewed and updated as appropriate: allergies, current medications, past family history, past medical history, past social history, past surgical history, and problem list.    I have reviewed and made appropriate changes to the review of systems input by the medical assistant.    Vitals:    03/11/24 0955   BP: 108/64   BP Location: Right arm   Patient Position: Sitting   Cuff Size: Standard   Pulse: 61   SpO2: 100%   Weight: 56.7 kg (125 lb)   Height: 5' 2.25\" (1.581 m)       Patient Active Problem List   Diagnosis    Atrophy of vagina    Cataracta    Osteopenia    Macrocytosis    Insomnia    Squamous cell skin cancer    Primary hypertension    Gastroesophageal reflux disease    Esophageal dysphagia    Colon polyp    Raynaud's disease without gangrene       Past Surgical History:   Procedure Laterality Date    BREAST BIOPSY      unsure laterality or date of biopsy    CATARACT " EXTRACTION, BILATERAL Bilateral 2018 january and april    COLONOSCOPY      1/2018: hemorrhoids, no polyps.    2/13/13 - Hemorrhoids, repeat 5 years d/t family h/o colon cancer    EYE SURGERY  2017/2018    Cataracts surgery OU, bilateral iStents    FOOT SURGERY      mortons neuroma    MOUTH SURGERY      Tooth Extraction - last assessed: Aug 19, 2014    MOUTH SURGERY N/A 11/2017    SC AMPUTATION TOE METATARSOPHALANGEAL JOINT Right 10/28/2022    Procedure: AMPUTATION TOE Right Second;  Surgeon: Derrick Mondragon DPM;  Location:  MAIN OR;  Service: Podiatry    SKIN BIOPSY Right 07/2018    right thigh    TONSILLECTOMY      last assessed: Aug 19, 2014       Family History   Problem Relation Age of Onset    Colon polyps Mother     Colon cancer Mother         x2: colostomy; ileostomy    Endometrial cancer Mother         60's    Cancer Mother         Endometrial/ hysterectomy; [place]-rectal cancer w/ colostomy; Colon cancer w/ colostomy revision    Heart disease Mother         A Fib    Hearing loss Mother     Vision loss Mother         Macular degeneration    Lung cancer Father 88    Cancer Father         Lung    COPD Father         Long time smoker    Hypertension Sister         new onset 9/22    No Known Problems Maternal Grandmother     No Known Problems Maternal Grandfather     No Known Problems Paternal Grandmother     No Known Problems Paternal Grandfather     Colon cancer Brother 61        Colon cancer    Cancer Brother         Colon cancer    Colon cancer Maternal Aunt         unknown age of onset    No Known Problems Maternal Aunt     No Known Problems Paternal Aunt     No Known Problems Paternal Aunt     No Known Problems Paternal Aunt     No Known Problems Paternal Aunt     No Known Problems Paternal Aunt        Social History     Socioeconomic History    Marital status: /Civil Union     Spouse name: Not on file    Number of children: 2    Years of education: Not on file    Highest education level:  Not on file   Occupational History    Not on file   Tobacco Use    Smoking status: Never    Smokeless tobacco: Never   Vaping Use    Vaping status: Never Used   Substance and Sexual Activity    Alcohol use: Yes     Alcohol/week: 1.0 standard drink of alcohol     Types: 1 Glasses of wine per week     Comment: 1 per day - social/ drinks wine     Drug use: No    Sexual activity: Yes     Partners: Male     Birth control/protection: Post-menopausal, Male Sterilization   Other Topics Concern    Not on file   Social History Narrative    Always uses seat belt     Caffeine use     [facility]      Social Determinants of Health     Financial Resource Strain: Low Risk  (2/26/2024)    Overall Financial Resource Strain (CARDIA)     Difficulty of Paying Living Expenses: Not hard at all   Food Insecurity: Not on file   Transportation Needs: No Transportation Needs (2/26/2024)    PRAPARE - Transportation     Lack of Transportation (Medical): No     Lack of Transportation (Non-Medical): No   Physical Activity: Not on file   Stress: Not on file   Social Connections: Not on file   Intimate Partner Violence: Not on file   Housing Stability: Not on file       No Known Allergies      Current Outpatient Medications:     amLODIPine (NORVASC) 5 mg tablet, Take 1 tablet (5 mg total) by mouth daily, Disp: 30 tablet, Rfl: 2    cetirizine (ZyrTEC) 5 MG tablet, Take 2.5 mg by mouth daily 1/2 tablet, Disp: , Rfl:     estradiol (Yuvafem) 10 MCG TABS vaginal tablet, Insert 1 tablet (10 mcg total) into the vagina 2 (two) times a week at bedtime, Disp: 24 tablet, Rfl: 3    famotidine (PEPCID) 40 MG tablet, Take 1 tablet (40 mg total) by mouth daily at bedtime, Disp: 90 tablet, Rfl: 2    latanoprost (XALATAN) 0.005 % ophthalmic solution, INSTILL 1 DROP INTO BOTH EYES AT BEDTIME, Disp: , Rfl:     zolpidem (AMBIEN) 5 mg tablet, TAKE 1/2 TO 1 TABLET BY MOUTH DAILY AS NEEDED FOR SLEEP, Disp: 15 tablet, Rfl: 0    betamethasone, augmented,  "(DIPROLENE) 0.05 % ointment, APPLY TO RASH ON UPPER/ LOWER EXTREMITIES TWICE DAILY X10-14 DAYS (DO NOT USE ON FACE OR BODY FOLDS) (Patient not taking: Reported on 12/29/2023), Disp: , Rfl: 1    Review of Systems   Constitutional: Negative.    HENT: Negative.     Eyes: Negative.    Respiratory: Negative.     Cardiovascular: Negative.    Gastrointestinal: Negative.    Endocrine: Negative.    Genitourinary: Negative.    Musculoskeletal:         Pain in toes both feet   Skin:  Positive for color change.   Allergic/Immunologic: Negative.    Neurological: Negative.    Hematological: Negative.    Psychiatric/Behavioral: Negative.         I have personally reviewed the ROS entered by MA and agree as documented.    Objective:      /64 (BP Location: Right arm, Patient Position: Sitting, Cuff Size: Standard)   Pulse 61   Ht 5' 2.25\" (1.581 m)   Wt 56.7 kg (125 lb)   LMP  (LMP Unknown)   SpO2 100%   BMI 22.68 kg/m²          Physical Exam  Constitutional:       Appearance: Normal appearance.   HENT:      Head: Normocephalic and atraumatic.   Cardiovascular:      Rate and Rhythm: Normal rate.      Pulses: Normal pulses.   Pulmonary:      Effort: Pulmonary effort is normal.   Abdominal:      Palpations: Abdomen is soft.   Musculoskeletal:         General: Normal range of motion.      Cervical back: Normal range of motion and neck supple.   Skin:     General: Skin is warm and dry.      Capillary Refill: Capillary refill takes less than 2 seconds.      Comments: Purplish discoloration of the toes, no open wounds   Neurological:      General: No focal deficit present.      Mental Status: She is alert and oriented to person, place, and time.   Psychiatric:         Mood and Affect: Mood normal.         Behavior: Behavior normal.         Thought Content: Thought content normal.         Judgment: Judgment normal.           "

## 2024-03-12 DIAGNOSIS — I73.00 RAYNAUD'S DISEASE WITHOUT GANGRENE: Primary | ICD-10-CM

## 2024-03-21 DIAGNOSIS — I73.00 RAYNAUD'S DISEASE WITHOUT GANGRENE: ICD-10-CM

## 2024-03-21 RX ORDER — AMLODIPINE BESYLATE 5 MG/1
5 TABLET ORAL DAILY
Qty: 90 TABLET | Refills: 1 | Status: SHIPPED | OUTPATIENT
Start: 2024-03-21

## 2024-04-02 DIAGNOSIS — R00.0 TACHYCARDIA: Primary | ICD-10-CM

## 2024-04-03 ENCOUNTER — HOSPITAL ENCOUNTER (OUTPATIENT)
Dept: NON INVASIVE DIAGNOSTICS | Facility: HOSPITAL | Age: 79
Discharge: HOME | End: 2024-04-03
Payer: MEDICARE

## 2024-04-03 VITALS
BODY MASS INDEX: 23 KG/M2 | DIASTOLIC BLOOD PRESSURE: 64 MMHG | HEIGHT: 62 IN | HEART RATE: 76 BPM | SYSTOLIC BLOOD PRESSURE: 108 MMHG | WEIGHT: 125 LBS

## 2024-04-03 DIAGNOSIS — R00.0 TACHYCARDIA: Primary | ICD-10-CM

## 2024-04-03 DIAGNOSIS — R00.0 TACHYCARDIA: ICD-10-CM

## 2024-04-03 LAB
AORTIC ROOT: 2.8 CM
AORTIC VALVE MEAN VELOCITY: 9.6 M/S
APICAL FOUR CHAMBER EJECTION FRACTION: 60 %
AV LVOT MEAN GRADIENT: 3 MMHG
AV MEAN GRADIENT: 4 MMHG
AV REGURGITATION PRESSURE HALF TIME: 434 MS
BSA FOR ECHO PROCEDURE: 1.57 M2
DOP CALC AO VTI: 26.6 CM
DOP CALC LVOT PEAK VEL VTI: 20.1 CM
E WAVE DECELERATION TIME: 159 MS
E/A RATIO: 0.82
FRACTIONAL SHORTENING: 33 (ref 28–44)
INTERVENTRICULAR SEPTUM IN DIASTOLE (PARASTERNAL SHORT AXIS VIEW): 0.8 CM
INTERVENTRICULAR SEPTUM: 0.8 CM (ref 0.6–1.1)
LA/AORTA RATIO 2D: 1.07
LAAS-AP2: 13.3 CM2
LAAS-AP4: 15.2 CM2
LEFT ATRIUM SIZE: 3 CM
LEFT ATRIUM VOLUME (MOD BIPLANE): 36 ML
LEFT ATRIUM VOLUME INDEX (MOD BIPLANE): 22.9 ML/M2
LEFT INTERNAL DIMENSION IN SYSTOLE: 2.8 CM (ref 2.1–4)
LEFT VENTRICLE DIASTOLIC VOLUME (MOD BIPLANE): 59 ML
LEFT VENTRICLE DIASTOLIC VOLUME INDEX (MOD BIPLANE): 37.6 ML/M2
LEFT VENTRICLE SYSTOLIC VOLUME (MOD BIPLANE): 24 ML
LEFT VENTRICLE SYSTOLIC VOLUME INDEX (MOD BIPLANE): 15.3 ML/M2
LEFT VENTRICULAR INTERNAL DIMENSION IN DIASTOLE: 4.2 CM (ref 3.5–6)
LEFT VENTRICULAR POSTERIOR WALL IN END DIASTOLE: 0.7 CM
LEFT VENTRICULAR STROKE VOLUME: 49 ML
LV EF: 59 %
LVSV (TEICH): 49 ML
MV E'TISSUE VEL-LAT: 11 CM/S
MV E'TISSUE VEL-SEP: 8 CM/S
MV PEAK A VEL: 0.93 M/S
MV PEAK E VEL: 76 CM/S
MV STENOSIS PRESSURE HALF TIME: 47 MS
MV VALVE AREA P 1/2 METHOD: 4.68
RIGHT VENTRICLE ID DIMENSION: 3 CM
SL CV AV PEAK GRADIENT RETROGRADE: 77 MMHG
SL CV LV EF: 55
SL CV PED ECHO LEFT VENTRICLE DIASTOLIC VOLUME (MOD BIPLANE) 2D: 78 ML
SL CV PED ECHO LEFT VENTRICLE SYSTOLIC VOLUME (MOD BIPLANE) 2D: 29 ML
TR MAX PG: 20 MMHG
TR PEAK VELOCITY: 2.3 M/S
TRICUSPID ANNULAR PLANE SYSTOLIC EXCURSION: 1.9 CM
TRICUSPID VALVE PEAK REGURGITATION VELOCITY: 2.25 M/S

## 2024-04-03 PROCEDURE — 93306 TTE W/DOPPLER COMPLETE: CPT

## 2024-04-03 PROCEDURE — 93306 TTE W/DOPPLER COMPLETE: CPT | Performed by: INTERNAL MEDICINE

## 2024-04-11 ENCOUNTER — HOSPITAL ENCOUNTER (OUTPATIENT)
Dept: NON INVASIVE DIAGNOSTICS | Age: 79
Discharge: HOME | End: 2024-04-11
Payer: MEDICARE

## 2024-04-11 DIAGNOSIS — R00.0 TACHYCARDIA: ICD-10-CM

## 2024-04-11 PROCEDURE — 93225 XTRNL ECG REC<48 HRS REC: CPT

## 2024-04-11 PROCEDURE — 93226 XTRNL ECG REC<48 HR SCAN A/R: CPT

## 2024-04-15 ENCOUNTER — OFFICE VISIT (OUTPATIENT)
Dept: FAMILY MEDICINE CLINIC | Facility: HOSPITAL | Age: 79
End: 2024-04-15
Payer: MEDICARE

## 2024-04-15 VITALS
DIASTOLIC BLOOD PRESSURE: 70 MMHG | OXYGEN SATURATION: 98 % | HEART RATE: 88 BPM | HEIGHT: 62 IN | BODY MASS INDEX: 23.19 KG/M2 | WEIGHT: 126 LBS | SYSTOLIC BLOOD PRESSURE: 132 MMHG

## 2024-04-15 DIAGNOSIS — I10 PRIMARY HYPERTENSION: Primary | ICD-10-CM

## 2024-04-15 DIAGNOSIS — R00.0 TACHYCARDIA: ICD-10-CM

## 2024-04-15 DIAGNOSIS — I73.00 RAYNAUD'S DISEASE WITHOUT GANGRENE: ICD-10-CM

## 2024-04-15 DIAGNOSIS — M85.80 OSTEOPENIA, UNSPECIFIED LOCATION: ICD-10-CM

## 2024-04-15 PROCEDURE — G2211 COMPLEX E/M VISIT ADD ON: HCPCS | Performed by: INTERNAL MEDICINE

## 2024-04-15 PROCEDURE — 99214 OFFICE O/P EST MOD 30 MIN: CPT | Performed by: INTERNAL MEDICINE

## 2024-04-15 RX ORDER — METOPROLOL SUCCINATE 25 MG/1
12.5 TABLET, EXTENDED RELEASE ORAL DAILY
Qty: 30 TABLET | Refills: 2 | Status: SHIPPED | OUTPATIENT
Start: 2024-04-15

## 2024-04-15 NOTE — PROGRESS NOTES
Name: Luisa Granados      : 1945      MRN: [id number]  Encounter Provider: Sherry Kc DO  Encounter Date: 4/15/2024   Encounter department: [facility] [address]     Assessment & Plan     1. Primary hypertension  Assessment & Plan:  BP has trended up since stopping beta blocker and HR up even more significantly, con't current Amlodipine, restart Metoprolol succ 25 mg 1/2 tab q day, send in BP readings in 2 wks, call with SE, re-eval in 3 mos    Orders:  -     metoprolol succinate (TOPROL-XL) 25 mg 24 hr tablet; Take 0.5 tablets (12.5 mg total) by mouth daily    2. Raynaud's disease without gangrene  Assessment & Plan:  Improving, unsure whether d/t nicer weather or increase in Amlodipine - likely multifactorial, con't current CCB and encouraged to check digits frequently - call with ulcers      3. Tachycardia  Comments:  Likely d/t D/C of beta blocker, Echo done and essentially nml, Holter done and reading pending, restart beta blocker, send BP/HR readings in 1-2 wks    4. Osteopenia, unspecified location  Assessment & Plan:  Restart OTC Ca-Vit D 600-200 1 tab bid, check Vit D with labs - BW order given, will follow    Orders:  -     Vitamin D 25 hydroxy; Future       Colonoscopy  - 5 yrs    Mammo     Dexa  - osteopenia    BW     Subjective      HPI Pt here for follow up appt    In Feb we increased pts Amlodipine from 2.5 to 5 mg and stopped her Metoprolol in attempt to improve Raynauds symptoms and control BP w/o causing hypotension. Pt is here for a BP/med check.  BP at goal today and meds were reviewed and med list is UTD.  She denies missing doses of meds or SE with the meds.  She does check her BP outside the office and home numbers brought in and reviewed - BP ranging from 90/72 to 151/82 with an average of upper 120's/low 130's/70's.  She notes no frequent HA's/dizziness/double vision/CP.     Pt saw Vascular (Dr. Weems) in March for f/u Raynaud's dz - OV note  "reviewed.  She was encouraged to con't conservative tx and CCB.  She was told to f/u as needed given her neg LEAD.  She was recommended to see Rheum and has an appt in June.      GYN recommended she restart Ca/Vit D.  Stopped when had 1 elevated Ca level in 2021    Review of Systems   Constitutional:  Negative for chills and fever.   Eyes:  Negative for pain and visual disturbance.   Respiratory:  Negative for cough and shortness of breath.    Cardiovascular:  Negative for chest pain and palpitations.   Gastrointestinal:  Negative for abdominal pain, diarrhea and nausea.   Genitourinary:  Negative for difficulty urinating and dysuria.   Musculoskeletal:  Negative for back pain and neck pain.   Skin:  Negative for rash and wound.   Neurological:  Negative for dizziness and headaches.   Hematological:  Does not bruise/bleed easily.   Psychiatric/Behavioral:  Negative for confusion.        Current Outpatient Medications on File Prior to Visit   Medication Sig    amLODIPine (NORVASC) 5 mg tablet TAKE 1 TABLET (5 MG TOTAL) BY MOUTH DAILY.    betamethasone, augmented, (DIPROLENE) 0.05 % ointment APPLY TO RASH ON UPPER/ LOWER EXTREMITIES TWICE DAILY X10-14 DAYS (DO NOT USE ON FACE OR BODY FOLDS) (Patient not taking: Reported on 12/29/2023)    cetirizine (ZyrTEC) 5 MG tablet Take 2.5 mg by mouth daily 1/2 tablet    estradiol (Yuvafem) 10 MCG TABS vaginal tablet Insert 1 tablet (10 mcg total) into the vagina 2 (two) times a week at bedtime    famotidine (PEPCID) 40 MG tablet Take 1 tablet (40 mg total) by mouth daily at bedtime    latanoprost (XALATAN) 0.005 % ophthalmic solution INSTILL 1 DROP INTO BOTH EYES AT BEDTIME    zolpidem (AMBIEN) 5 mg tablet TAKE 1/2 TO 1 TABLET BY MOUTH DAILY AS NEEDED FOR SLEEP       Objective     /70   Pulse 88   Ht 5' 2.25\" (1.581 m)   Wt 57.2 kg (126 lb)   LMP  (LMP Unknown)   SpO2 98%   BMI 22.86 kg/m²     Physical Exam  Vitals and nursing note reviewed.   Constitutional:       " General: She is not in acute distress.     Appearance: She is not ill-appearing.   HENT:      Head: Normocephalic and atraumatic.   Eyes:      General:         Right eye: No discharge.         Left eye: No discharge.      Conjunctiva/sclera: Conjunctivae normal.   Pulmonary:      Effort: Pulmonary effort is normal. No respiratory distress.   Skin:     Coloration: Skin is not pale.      Findings: No rash.   Psychiatric:         Behavior: Behavior normal.         Thought Content: Thought content normal.         Judgment: Judgment normal.       Sherry Kc, DO

## 2024-04-17 ENCOUNTER — APPOINTMENT (OUTPATIENT)
Dept: LAB | Facility: HOSPITAL | Age: 79
End: 2024-04-17
Payer: MEDICARE

## 2024-04-17 DIAGNOSIS — M85.80 OSTEOPENIA, UNSPECIFIED LOCATION: ICD-10-CM

## 2024-04-17 LAB
25(OH)D3 SERPL-MCNC: 26.5 NG/ML (ref 30–100)
ALBUMIN SERPL BCP-MCNC: 4.1 G/DL (ref 3.5–5)
ALP SERPL-CCNC: 85 U/L (ref 34–104)
ALT SERPL W P-5'-P-CCNC: 22 U/L (ref 7–52)
ANION GAP SERPL CALCULATED.3IONS-SCNC: 5 MMOL/L (ref 4–13)
AST SERPL W P-5'-P-CCNC: 29 U/L (ref 13–39)
BILIRUB SERPL-MCNC: 0.47 MG/DL (ref 0.2–1)
BUN SERPL-MCNC: 23 MG/DL (ref 5–25)
CALCIUM SERPL-MCNC: 9.9 MG/DL (ref 8.4–10.2)
CHLORIDE SERPL-SCNC: 103 MMOL/L (ref 96–108)
CHOLEST SERPL-MCNC: 181 MG/DL
CO2 SERPL-SCNC: 28 MMOL/L (ref 21–32)
CREAT SERPL-MCNC: 0.79 MG/DL (ref 0.6–1.3)
ERYTHROCYTE [DISTWIDTH] IN BLOOD BY AUTOMATED COUNT: 12.6 % (ref 11.6–15.1)
GFR SERPL CREATININE-BSD FRML MDRD: 72 ML/MIN/1.73SQ M
GLUCOSE SERPL-MCNC: 82 MG/DL (ref 65–140)
HCT VFR BLD AUTO: 40.9 % (ref 34.8–46.1)
HDLC SERPL-MCNC: 83 MG/DL
HGB BLD-MCNC: 12.8 G/DL (ref 11.5–15.4)
LDLC SERPL CALC-MCNC: 86 MG/DL (ref 0–100)
MCH RBC QN AUTO: 30.3 PG (ref 26.8–34.3)
MCHC RBC AUTO-ENTMCNC: 31.3 G/DL (ref 31.4–37.4)
MCV RBC AUTO: 97 FL (ref 82–98)
NONHDLC SERPL-MCNC: 98 MG/DL
PLATELET # BLD AUTO: 251 THOUSANDS/UL (ref 149–390)
PMV BLD AUTO: 11.1 FL (ref 8.9–12.7)
POTASSIUM SERPL-SCNC: 4.9 MMOL/L (ref 3.5–5.3)
PROT SERPL-MCNC: 6.8 G/DL (ref 6.4–8.4)
RBC # BLD AUTO: 4.22 MILLION/UL (ref 3.81–5.12)
SODIUM SERPL-SCNC: 136 MMOL/L (ref 135–147)
TRIGL SERPL-MCNC: 60 MG/DL
TSH SERPL DL<=0.05 MIU/L-ACNC: 2.39 UIU/ML (ref 0.45–4.5)
WBC # BLD AUTO: 4.26 THOUSAND/UL (ref 4.31–10.16)

## 2024-04-17 PROCEDURE — 82306 VITAMIN D 25 HYDROXY: CPT

## 2024-04-17 PROCEDURE — 36415 COLL VENOUS BLD VENIPUNCTURE: CPT

## 2024-04-24 NOTE — PROGRESS NOTES
Patient ID: Luisa Granados is a 79 y.o. female Date of Birth 1945       Chief Complaint   Patient presents with    Bunions     Right - corn; 5th toe    Callouses     Right 4th toe             Diagnosis:  1. Corns    2. Raynaud's disease without gangrene    3. Hammertoe of right foot       pedal examination with socks and shoes removed bilaterally.  The patient's PCP visit note from 4/15/2023 and vascular surgery note from 3/11/2024 and lower extremity arterial Dopplers from 1/31/2024.  Hyperkeratosis right fifth toe, right third toe were trimmed without incident, silicone toe sleeve was dispensed for her right fifth toe, she states it feels much better with this.  Patient to continue following with PCP for Raynaud's therapy/treatment with amlodipine which she is doing very well.  Today we discussed wider toebox, deeper toebox shoes to help accommodate her hammertoes.  She will follow-up as needed.          Subjective:   Jerri presents today for evaluation and care of corn on her right 5th toe.  She states since she was last seen by me she is following with her PCP and now on amlodipine for Raynaud's, she states the symptoms are getting better, she is not sure if it is due to nicer weather or from medication, PCP thinks it is probably combination of both.  No new complaints.        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)     Hammer toe 1995    Amputated 10/28/22    Hypertension 4/22    Olmesartan    Menopause ovarian failure 1995    Plantar fasciitis 2000    Resolved    Transaminitis     Last assessed: Mike 3, 2014    Varicose veins with pain, unspecified laterality     Resolved: June 6, 2017    Vasovagal syncope     last assessed: Dec 30, 2013       Past Surgical History:   Procedure Laterality  Date    AMPUTATION  10/28/22    Rt second toe    BREAST BIOPSY      unsure laterality or date of biopsy    CATARACT EXTRACTION, BILATERAL Bilateral 2018 january and april    COLONOSCOPY      1/2018: hemorrhoids, no polyps.    2/13/13 - Hemorrhoids, repeat 5 years d/t family h/o colon cancer    EYE SURGERY  2017/2018    Cataracts surgery OU, bilateral iStents    FOOT SURGERY      mortons neuroma    MOUTH SURGERY      Tooth Extraction - last assessed: Aug 19, 2014    MOUTH SURGERY N/A 11/2017    AK AMPUTATION TOE METATARSOPHALANGEAL JOINT Right 10/28/2022    Procedure: AMPUTATION TOE Right Second;  Surgeon: Derrick Mondragon DPM;  Location:  MAIN OR;  Service: Podiatry    SKIN BIOPSY Right 07/2018    right thigh    TONSILLECTOMY      last assessed: Aug 19, 2014       Social History     Socioeconomic History    Marital status: /Civil Union     Spouse name: None    Number of children: 2    Years of education: None    Highest education level: None   Occupational History    None   Tobacco Use    Smoking status: Never    Smokeless tobacco: Never   Vaping Use    Vaping status: Never Used   Substance and Sexual Activity    Alcohol use: Yes     Alcohol/week: 5.0 standard drinks of alcohol     Types: 5 Glasses of wine per week     Comment: 1 per day - social/ drinks wine     Drug use: No    Sexual activity: Yes     Partners: Male     Birth control/protection: Post-menopausal, Male Sterilization   Other Topics Concern    None   Social History Narrative    Always uses seat belt     Caffeine use     [facility]      Social Determinants of Health     Financial Resource Strain: Low Risk  (2/26/2024)    Overall Financial Resource Strain (CARDIA)     Difficulty of Paying Living Expenses: Not hard at all   Food Insecurity: Not on file   Transportation Needs: No Transportation Needs (2/26/2024)    PRAPARE - Transportation     Lack of Transportation (Medical): No     Lack of Transportation (Non-Medical): No    Physical Activity: Not on file   Stress: Not on file   Social Connections: Not on file   Intimate Partner Violence: Not on file   Housing Stability: Not on file          Current Outpatient Medications:     amLODIPine (NORVASC) 5 mg tablet, TAKE 1 TABLET (5 MG TOTAL) BY MOUTH DAILY., Disp: 90 tablet, Rfl: 1    cetirizine (ZyrTEC) 5 MG tablet, Take 2.5 mg by mouth daily 1/2 tablet, Disp: , Rfl:     estradiol (Yuvafem) 10 MCG TABS vaginal tablet, Insert 1 tablet (10 mcg total) into the vagina 2 (two) times a week at bedtime, Disp: 24 tablet, Rfl: 3    famotidine (PEPCID) 40 MG tablet, Take 1 tablet (40 mg total) by mouth daily at bedtime, Disp: 90 tablet, Rfl: 2    latanoprost (XALATAN) 0.005 % ophthalmic solution, INSTILL 1 DROP INTO BOTH EYES AT BEDTIME, Disp: , Rfl:     metoprolol succinate (TOPROL-XL) 25 mg 24 hr tablet, Take 0.5 tablets (12.5 mg total) by mouth daily, Disp: 30 tablet, Rfl: 2    zolpidem (AMBIEN) 5 mg tablet, TAKE 1/2 TO 1 TABLET BY MOUTH DAILY AS NEEDED FOR SLEEP, Disp: 15 tablet, Rfl: 0    betamethasone, augmented, (DIPROLENE) 0.05 % ointment, APPLY TO RASH ON UPPER/ LOWER EXTREMITIES TWICE DAILY X10-14 DAYS (DO NOT USE ON FACE OR BODY FOLDS) (Patient not taking: Reported on 12/29/2023), Disp: , Rfl: 1    Allergies  Patient has no known allergies.    Family History   Problem Relation Age of Onset    Colon polyps Mother     Colon cancer Mother         x2: colostomy; ileostomy    Endometrial cancer Mother         60's    Cancer Mother         Endometrial/ hysterectomy; [place]-rectal cancer w/ colostomy; Colon cancer w/ colostomy revision    Heart disease Mother         A Fib    Hearing loss Mother     Vision loss Mother         Macular degeneration    Lung cancer Father 88    Cancer Father         Lung    COPD Father         Long time smoker    Hypertension Sister         new onset 9/22    No Known Problems Maternal Grandmother     No Known Problems Maternal Grandfather     No Known Problems  "Paternal Grandmother     No Known Problems Paternal Grandfather     Colon cancer Brother 61        Colon cancer    Cancer Brother         Colon cancer    Colon cancer Maternal Aunt         unknown age of onset    No Known Problems Maternal Aunt     No Known Problems Paternal Aunt     No Known Problems Paternal Aunt     No Known Problems Paternal Aunt     No Known Problems Paternal Aunt     No Known Problems Paternal Aunt            Objective:  /61 (BP Location: Right arm, Patient Position: Sitting, Cuff Size: Adult)   Pulse 73   Ht 5' 2.25\" (1.581 m) Comment: verbal  Wt 56.8 kg (125 lb 3.2 oz)   LMP  (LMP Unknown)   BMI 22.72 kg/m²     Review of Systems   Constitutional:  Negative for chills and fever.   HENT:  Negative for ear pain and sore throat.    Eyes:  Negative for pain and visual disturbance.   Respiratory:  Negative for cough and shortness of breath.    Cardiovascular:  Negative for chest pain and palpitations.   Gastrointestinal:  Negative for abdominal pain and vomiting.   Genitourinary:  Negative for dysuria and hematuria.   Musculoskeletal:  Negative for arthralgias and back pain.   Skin:  Negative for color change and rash.        Painful corn on right foot   Neurological:  Negative for seizures and syncope.   All other systems reviewed and are negative.      Physical Exam  Constitutional:       Appearance: Normal appearance. She is well-developed and normal weight.   HENT:      Head: Normocephalic and atraumatic.      Nose: Nose normal.      Mouth/Throat:      Mouth: Mucous membranes are moist.      Pharynx: Oropharynx is clear.   Eyes:      Conjunctiva/sclera: Conjunctivae normal.   Cardiovascular:      Pulses:           Dorsalis pedis pulses are 2+ on the right side and 2+ on the left side.        Posterior tibial pulses are 2+ on the right side and 2+ on the left side.   Pulmonary:      Effort: Pulmonary effort is normal.   Musculoskeletal:         General: Normal range of motion.      " "Cervical back: Normal range of motion.      Right lower leg: No edema.      Left lower leg: No edema.      Right foot: Deformity present.      Left foot: Deformity present.   Feet:      Right foot:      Protective Sensation: 10 sites tested.  10 sites sensed.      Left foot:      Protective Sensation: 10 sites tested.  10 sites sensed.      Comments: Prior second toe amputation right foot, dorsally dislocated rigid hammertoes 3, 4 of right foot with  corn distal lateral plantar tip third toe and medial fifth toe right foot.  Fat pad atrophy submetatarsal heads 1 through 5  Pes cavus foot type bilateral  Toes are significantly warmer to touch with minimal rubor to distal tips, they are blanchable, capillary filling time is slightly sluggish, proximal to all IPJ's toes are warm and color is within normal limits.  Skin:     General: Skin is warm and dry.      Capillary Refill: Capillary refill takes less than 2 seconds.   Neurological:      General: No focal deficit present.      Mental Status: She is alert and oriented to person, place, and time. Mental status is at baseline.   Psychiatric:         Mood and Affect: Mood normal.         Behavior: Behavior normal.         Thought Content: Thought content normal.         Judgment: Judgment normal.                 No pertinent results found.      Camille Casas, CHARLAM, DPM, FACFAS    Portions of the record may have been created with voice recognition software. Occasional wrong word or \"sound a like\" substitutions may have occurred due to the inherent limitations of voice recognition software. Read the chart carefully and recognize, using context, where substitutions have occurred.  "

## 2024-04-26 ENCOUNTER — OFFICE VISIT (OUTPATIENT)
Dept: PODIATRY | Facility: CLINIC | Age: 79
End: 2024-04-26
Payer: MEDICARE

## 2024-04-26 VITALS
BODY MASS INDEX: 23.04 KG/M2 | WEIGHT: 125.2 LBS | HEART RATE: 73 BPM | SYSTOLIC BLOOD PRESSURE: 112 MMHG | DIASTOLIC BLOOD PRESSURE: 61 MMHG | HEIGHT: 62 IN

## 2024-04-26 DIAGNOSIS — L84 CORNS: Primary | ICD-10-CM

## 2024-04-26 DIAGNOSIS — M20.41 HAMMERTOE OF RIGHT FOOT: ICD-10-CM

## 2024-04-26 DIAGNOSIS — I73.00 RAYNAUD'S DISEASE WITHOUT GANGRENE: ICD-10-CM

## 2024-04-26 PROCEDURE — 99213 OFFICE O/P EST LOW 20 MIN: CPT | Performed by: PODIATRIST

## 2024-05-02 DIAGNOSIS — K21.9 GASTROESOPHAGEAL REFLUX DISEASE, UNSPECIFIED WHETHER ESOPHAGITIS PRESENT: ICD-10-CM

## 2024-05-02 RX ORDER — FAMOTIDINE 40 MG/1
40 TABLET, FILM COATED ORAL
Qty: 90 TABLET | Refills: 2 | Status: SHIPPED | OUTPATIENT
Start: 2024-05-02
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
## 2024-05-17 RX ORDER — DOXYCYCLINE HYCLATE 100 MG/1
200 CAPSULE ORAL ONCE
Qty: 2 CAPSULE | Refills: 0 | Status: SHIPPED | OUTPATIENT
Start: 2024-05-17 | End: 2024-05-17

## 2024-06-06 ENCOUNTER — OFFICE VISIT (OUTPATIENT)
Dept: FAMILY MEDICINE CLINIC | Facility: HOSPITAL | Age: 79
End: 2024-06-06
Payer: MEDICARE

## 2024-06-06 VITALS
WEIGHT: 123.6 LBS | DIASTOLIC BLOOD PRESSURE: 70 MMHG | HEIGHT: 63 IN | SYSTOLIC BLOOD PRESSURE: 122 MMHG | HEART RATE: 88 BPM | TEMPERATURE: 98.2 F | BODY MASS INDEX: 21.9 KG/M2 | OXYGEN SATURATION: 98 %

## 2024-06-06 DIAGNOSIS — M79.671 RIGHT FOOT PAIN: Primary | ICD-10-CM

## 2024-06-06 DIAGNOSIS — I10 PRIMARY HYPERTENSION: ICD-10-CM

## 2024-06-06 PROCEDURE — G2211 COMPLEX E/M VISIT ADD ON: HCPCS | Performed by: INTERNAL MEDICINE

## 2024-06-06 PROCEDURE — 99214 OFFICE O/P EST MOD 30 MIN: CPT | Performed by: INTERNAL MEDICINE

## 2024-06-06 NOTE — ASSESSMENT & PLAN NOTE
BP at goal in office and at home, HR at home was up a bit end of May but has come down, discussed poss adding evening dose of Metoprolol (currently Toprol 25 mg 1/2 tab q am) but will hold for now - if HR jumps back up pt was told to call and would increase Toprol to 25 mg 1/2 tab bid, will follow, advised to avoid triggers

## 2024-06-06 NOTE — PROGRESS NOTES
"Ambulatory Visit  Name: Luisa Granados      : 1945      MRN: [id number]  Encounter Provider: Sherry Kc DO  Encounter Date: 2024   Encounter department: [facility] [id number]     Assessment & Plan   1. Right foot pain  Comments:  Appears to be more of a corn then a FB, tender region with black center was shaved and no FB noted, area dressed and band-aid applied, has f/u with podiatry  2. Primary hypertension  Assessment & Plan:  BP at goal in office and at home, HR at home was up a bit end of May but has come down, discussed poss adding evening dose of Metoprolol (currently Toprol 25 mg 1/2 tab q am) but will hold for now - if HR jumps back up pt was told to call and would increase Toprol to 25 mg 1/2 tab bid, will follow, advised to avoid triggers     Colonoscopy  - 5 yrs    Mammo     Dexa  - osteopenia    BW       History of Present Illness     HPI Pt here for an acute visit    Noting tender lump at ball of R foot. Not sure if she got something in it but notes no specific trauma/injury.  Pain was really bad a few days ago but is better the last day or so.     Home BP and HR brought in and reviewed.  Had a few days the end of May when HR was up in the 90's to low 100's. The past week or so has been 70-80's. BP's look great.     Review of Systems   Constitutional:  Negative for chills.   Respiratory:  Negative for cough and shortness of breath.    Cardiovascular:  Negative for chest pain.   Musculoskeletal:  Negative for gait problem.   Skin:         See above HPI       Objective     /70   Pulse 88   Temp 98.2 °F (36.8 °C)   Ht 5' 2.5\" (1.588 m)   Wt 56.1 kg (123 lb 9.6 oz)   LMP  (LMP Unknown)   SpO2 98%   BMI 22.25 kg/m²     Physical Exam  Vitals and nursing note reviewed.   Constitutional:       General: She is not in acute distress.     Appearance: She is not ill-appearing.   HENT:      Head: Normocephalic and atraumatic.   Eyes:      General:  "        Right eye: No discharge.         Left eye: No discharge.      Conjunctiva/sclera: Conjunctivae normal.   Pulmonary:      Effort: Pulmonary effort is normal. No respiratory distress.   Skin:     Coloration: Skin is not pale.      Findings: No rash.      Comments: R foot plantar surface at base of 3rd to is a slightly raised flesh colored region with mild tenderness to palp with tiny black center   Neurological:      Gait: Gait normal.   Psychiatric:         Behavior: Behavior normal.         Thought Content: Thought content normal.         Judgment: Judgment normal.       Administrative Statements

## 2024-06-08 DIAGNOSIS — I10 PRIMARY HYPERTENSION: ICD-10-CM

## 2024-06-08 RX ORDER — METOPROLOL SUCCINATE 25 MG/1
12.5 TABLET, EXTENDED RELEASE ORAL DAILY
Qty: 45 TABLET | Refills: 1 | Status: SHIPPED | OUTPATIENT
Start: 2024-06-08

## 2024-06-25 ENCOUNTER — APPOINTMENT (OUTPATIENT)
Dept: RADIOLOGY | Facility: CLINIC | Age: 79
End: 2024-06-25
Payer: MEDICARE

## 2024-06-25 ENCOUNTER — OFFICE VISIT (OUTPATIENT)
Dept: RHEUMATOLOGY | Facility: CLINIC | Age: 79
End: 2024-06-25
Payer: MEDICARE

## 2024-06-25 ENCOUNTER — APPOINTMENT (OUTPATIENT)
Dept: LAB | Facility: CLINIC | Age: 79
End: 2024-06-25
Payer: MEDICARE

## 2024-06-25 VITALS
DIASTOLIC BLOOD PRESSURE: 72 MMHG | HEIGHT: 63 IN | WEIGHT: 123 LBS | SYSTOLIC BLOOD PRESSURE: 116 MMHG | BODY MASS INDEX: 21.79 KG/M2

## 2024-06-25 DIAGNOSIS — M79.644 PAIN OF FINGER OF RIGHT HAND: ICD-10-CM

## 2024-06-25 DIAGNOSIS — I73.00 RAYNAUD'S DISEASE WITHOUT GANGRENE: Primary | ICD-10-CM

## 2024-06-25 LAB
CRP SERPL QL: 8.7 MG/L
ERYTHROCYTE [SEDIMENTATION RATE] IN BLOOD: 15 MM/HOUR (ref 0–29)

## 2024-06-25 PROCEDURE — 85652 RBC SED RATE AUTOMATED: CPT | Performed by: INTERNAL MEDICINE

## 2024-06-25 PROCEDURE — 99204 OFFICE O/P NEW MOD 45 MIN: CPT | Performed by: INTERNAL MEDICINE

## 2024-06-25 PROCEDURE — 73130 X-RAY EXAM OF HAND: CPT

## 2024-06-25 PROCEDURE — 86038 ANTINUCLEAR ANTIBODIES: CPT | Performed by: INTERNAL MEDICINE

## 2024-06-25 PROCEDURE — 86140 C-REACTIVE PROTEIN: CPT | Performed by: INTERNAL MEDICINE

## 2024-06-25 PROCEDURE — 36415 COLL VENOUS BLD VENIPUNCTURE: CPT | Performed by: INTERNAL MEDICINE

## 2024-06-25 RX ORDER — DOXYCYCLINE HYCLATE 100 MG/1
CAPSULE ORAL
COMMUNITY
Start: 2024-05-17

## 2024-06-25 NOTE — PATIENT INSTRUCTIONS
Do hip bursitis exercises; can do PT if not helping  Start using nitroglycerin ointment as needed for Raynaud's symptoms  Can try sildenafil if not controlled  Do labs  Do right hand x-rays  Try diclofenac gel as needed for pain    Return to clinic in 6 months

## 2024-06-25 NOTE — PROGRESS NOTES
RHEUMATOLOGY NEW PATIENT NOTE    Assessment and Plan:   Luisa Granados is a 79 y.o.  female who presents as a Rheumatology consult referred by Sherry Kc DO for evaluation of Raynaud's syndrome and possible connective tissue disease. Also seems to have bilateral hip bursitis symptoms.    Do hip bursitis exercises; can do PT if not helping  Start using nitroglycerin ointment as needed for Raynaud's symptoms  Can try sildenafil if not controlled  Do labs  Do right hand x-rays  Try diclofenac gel as needed for joint pain    Return to clinic in 6 months  Assessment & Plan  1. Raynaud's syndrome.  Inflammatory markers and a basic lupus test will be conducted.    Plan:  Diagnoses and all orders for this visit:    Raynaud's disease without gangrene  -     Ambulatory Referral to Rheumatology  -     Antinuclear Antibodies, IFA  -     Sedimentation rate, automated  -     C-reactive protein  -     nitroglycerin (NITRO-BID) 2 % ointment; Apply 1 inch topically 2 (two) times a day    Pain of finger of right hand  -     XR hand 3+ vw right; Future    Other orders  -     doxycycline hyclate (VIBRAMYCIN) 100 mg capsule; TAKE 2 CAPSULES BY MOUTH ONCE FOR 1 DOSE. (Patient not taking: Reported on 6/25/2024)        Follow-up plan: RTC in 6 months        Chief Complaint  No chief complaint on file.      HPI  Luisa Granados is a 79 y.o.  female who presents as a Rheumatology consult referred by Sherry Kc DO for evaluation of Raynaud's symptoms.    History of Present Illness  The patient is a 79-year-old female who presents for new patient evaluation of Raynaud's syndrome. She mainly gets discoloration of the fingers and toes, more so in the winter and some pain associated with it, not as bad in the summer. She is already on amlodipine 5 mg.    The patient expresses interest in discussing Viagra. She reports experiencing sinus problems, dry mouth, difficulty swallowing, joint pain, muscle pain, itching, hand color changes,  and intermittent leg swelling. She also experiences joint pain in her hips and fingers. Her medical history includes Guillain-Ojo Feliz syndrome in 2005, which she reports did not affect her condition.   She denies any family history of autoimmune disease such as lupus or rheumatoid arthritis.      Review of Systems  See HPI    Allergies  No Known Allergies    Home Medications    Current Outpatient Medications:     amLODIPine (NORVASC) 5 mg tablet, TAKE 1 TABLET (5 MG TOTAL) BY MOUTH DAILY., Disp: 90 tablet, Rfl: 1    betamethasone, augmented, (DIPROLENE) 0.05 % ointment, , Disp: , Rfl: 1    cetirizine (ZyrTEC) 5 MG tablet, Take 2.5 mg by mouth daily 1/2 tablet, Disp: , Rfl:     estradiol (Yuvafem) 10 MCG TABS vaginal tablet, Insert 1 tablet (10 mcg total) into the vagina 2 (two) times a week at bedtime, Disp: 24 tablet, Rfl: 3    famotidine (PEPCID) 40 MG tablet, TAKE 1 TABLET BY MOUTH DAILY AT BEDTIME, Disp: 90 tablet, Rfl: 2    latanoprost (XALATAN) 0.005 % ophthalmic solution, INSTILL 1 DROP INTO BOTH EYES AT BEDTIME, Disp: , Rfl:     metoprolol succinate (TOPROL-XL) 25 mg 24 hr tablet, TAKE 1/2 TABLET BY MOUTH EVERY DAY, Disp: 45 tablet, Rfl: 1    nitroglycerin (NITRO-BID) 2 % ointment, Apply 1 inch topically 2 (two) times a day, Disp: 30 g, Rfl: 6    zolpidem (AMBIEN) 5 mg tablet, TAKE 1/2 TO 1 TABLET BY MOUTH DAILY AS NEEDED FOR SLEEP, Disp: 15 tablet, Rfl: 0    doxycycline hyclate (VIBRAMYCIN) 100 mg capsule, TAKE 2 CAPSULES BY MOUTH ONCE FOR 1 DOSE. (Patient not taking: Reported on 6/25/2024), Disp: , Rfl:     Past Medical History  Past Medical History:   Diagnosis Date    Breast asymmetry     Resolved: Nov 9, 2017    Colon polyp     Family history of colon cancer     Guillain-Ojo Feliz syndrome (HCC)     Hammer toe 1995    Amputated 10/28/22    Hypertension 4/22    Olmesartan    Menopause ovarian failure 1995    Plantar fasciitis 2000    Resolved    Transaminitis     Last assessed: Mike 3, 2014    Varicose veins  with pain, unspecified laterality     Resolved: June 6, 2017    Vasovagal syncope     last assessed: Dec 30, 2013       Past Surgical History   Past Surgical History:   Procedure Laterality Date    AMPUTATION  10/28/22    Rt second toe    BREAST BIOPSY      unsure laterality or date of biopsy    CATARACT EXTRACTION, BILATERAL Bilateral 2018 january and april    COLONOSCOPY      1/2018: hemorrhoids, no polyps.    2/13/13 - Hemorrhoids, repeat 5 years d/t family h/o colon cancer    EYE SURGERY  2017/2018    Cataracts surgery OU, bilateral iStents    FOOT SURGERY      mortons neuroma    MOUTH SURGERY      Tooth Extraction - last assessed: Aug 19, 2014    MOUTH SURGERY N/A 11/2017    ME AMPUTATION TOE METATARSOPHALANGEAL JOINT Right 10/28/2022    Procedure: AMPUTATION TOE Right Second;  Surgeon: Derrick Mondragon DPM;  Location:  MAIN OR;  Service: Podiatry    SKIN BIOPSY Right 07/2018    right thigh    TONSILLECTOMY      last assessed: Aug 19, 2014       Family History    Family History   Problem Relation Age of Onset    Colon polyps Mother     Colon cancer Mother         x2: colostomy; ileostomy    Endometrial cancer Mother         60's    Cancer Mother         Endometrial/ hysterectomy; [place]-rectal cancer w/ colostomy; Colon cancer w/ colostomy revision    Heart disease Mother         A Fib    Hearing loss Mother     Vision loss Mother         Macular degeneration    Lung cancer Father 88    Cancer Father         Lung    COPD Father         Long time smoker    Hypertension Sister         new onset 9/22    No Known Problems Maternal Grandmother     No Known Problems Maternal Grandfather     No Known Problems Paternal Grandmother     No Known Problems Paternal Grandfather     Colon cancer Brother 61        Colon cancer    Cancer Brother         Colon cancer    Colon cancer Maternal Aunt         unknown age of onset    No Known Problems Maternal Aunt     No Known Problems Paternal Aunt     No Known Problems  "Paternal Aunt     No Known Problems Paternal Aunt     No Known Problems Paternal Aunt     No Known Problems Paternal Aunt        Social History  Social History     Substance and Sexual Activity   Alcohol Use Yes    Alcohol/week: 5.0 standard drinks of alcohol    Types: 5 Glasses of wine per week    Comment: 1 per day - social/ drinks wine      Social History     Substance and Sexual Activity   Drug Use No     Social History     Tobacco Use   Smoking Status Never   Smokeless Tobacco Never       Objective:  Vitals:    06/25/24 1304   BP: 116/72   Weight: 55.8 kg (123 lb)   Height: 5' 3\" (1.6 m)       Physical Exam  Constitutional:       General: She is not in acute distress.  HENT:      Head: Normocephalic and atraumatic.   Eyes:      Conjunctiva/sclera: Conjunctivae normal.   Cardiovascular:      Rate and Rhythm: Normal rate and regular rhythm.      Heart sounds: S1 normal and S2 normal.      No friction rub.   Pulmonary:      Effort: Pulmonary effort is normal. No respiratory distress.      Breath sounds: Normal breath sounds. No wheezing, rhonchi or rales.   Musculoskeletal:      Cervical back: Neck supple.   Skin:     General: Skin is warm and dry.      Coloration: Skin is not pale.      Findings: No rash.   Neurological:      Mental Status: She is alert. Mental status is at baseline.   Psychiatric:         Mood and Affect: Mood normal.         Behavior: Behavior normal.       Physical Exam  Swelling and tenderness noted in the right third distal finger.    Reviewed labs and imaging.    Imaging:     No results found.     Labs:   Office Visit on 06/25/2024   Component Date Value Ref Range Status    Antinuclear Antibodies, IFA 06/25/2024 Negative   Final                                         Negative   <1:80                                       Borderline  1:80                                       Positive   >1:80  ICAP nomenclature: AC-0  For more information about Hep-2 cell patterns use  ANApatterns.org, the " official website for the International  Consensus on Antinuclear Antibody (MOUSTAPHA) Patterns (ICAP).    Sed Rate 06/25/2024 15  0 - 29 mm/hour Final    CRP 06/25/2024 8.7 (H)  <3.0 mg/L Final   Appointment on 04/17/2024   Component Date Value Ref Range Status    Vit D, 25-Hydroxy 04/17/2024 26.5 (L)  30.0 - 100.0 ng/mL Final    Vitamin D guidelines established by Clinical Guidelines Subcommittee  of the Endocrine Society Task Force, 2011    Deficiency <20ng/ml   Insufficiency 20-30ng/ml   Sufficient  ng/ml    Hospital Outpatient Visit on 04/03/2024   Component Date Value Ref Range Status    AV peak gradient 04/03/2024 77  mmHg Final    LA/Ao Ratio 2D 04/03/2024 1.07   Final    Triscuspid Valve Regurgitation Pea* 04/03/2024 20.0  mmHg Final    LA Volume Index (BP) 04/03/2024 22.9  mL/m2 Final    LV Diastolic Volume (BP) 04/03/2024 59  mL Final    LV Systolic Volume (BP) 04/03/2024 24  mL Final    MV Peak A Vladislav 04/03/2024 0.93  m/s Final    MV stenosis pressure 1/2 time 04/03/2024 47  ms Final    MV Peak E Vladislav 04/03/2024 76  cm/s Final    Ao VTI 04/03/2024 26.6  cm Final    LVOT peak VTI 04/03/2024 20.1  cm Final    E wave deceleration time 04/03/2024 159  ms Final    E/A ratio 04/03/2024 0.82   Final    MV valve area p 1/2 method 04/03/2024 4.68   Final    AV mean gradient 04/03/2024 4  mmHg Final    AV regurgitation pressure 1/2 time 04/03/2024 434  ms Final    TR Peak Vldaislav 04/03/2024 2.3  m/s Final    LVOT mn grad 04/03/2024 3.0  mmHg Final    RVID d 04/03/2024 3.0  cm Final    A4C EF 04/03/2024 60  % Final    Aortic valve mean velocity 04/03/2024 9.60  m/s Final    Tricuspid valve peak regurgitation* 04/03/2024 2.25  m/s Final    Left ventricular stroke volume (2D) 04/03/2024 49.00  mL Final    IVSd 04/03/2024 0.80  cm Final    Tricuspid annular plane systolic e* 04/03/2024 1.90  cm Final    Ao root 04/03/2024 2.80  cm Final    LVPWd 04/03/2024 0.70  cm Final    LA size 04/03/2024 3  cm Final    LA volume (BP)  04/03/2024 36  mL Final    EF 04/03/2024 59  % Final    FS 04/03/2024 33  28 - 44 Final    LVIDS 04/03/2024 2.80  cm Final    IVS 04/03/2024 0.8  cm Final    LVIDd 04/03/2024 4.20  cm Final    LEFT VENTRICLE SYSTOLIC VOLUME (MO* 04/03/2024 29  mL Final    LV DIASTOLIC VOLUME (MOD BIPLANE) * 04/03/2024 78  mL Final    Left Atrium Area-systolic [place]* 04/03/2024 15.2  cm2 Final    Left Atrium Area-systolic Apical T* 04/03/2024 13.3  cm2 Final    MV E' Tissue Velocity Lateral 04/03/2024 11  cm/s Final    MV E' Tissue Velocity Septal 04/03/2024 8  cm/s Final    LVSV, 2D 04/03/2024 49  mL Final    BSA 04/03/2024 1.57  m2 Final    LV Diastolic Volume Index (BP) 04/03/2024 37.6  mL/m2 Final    LV Systolic Volume Index (BP) 04/03/2024 15.3  mL/m2 Final    LV EF 04/03/2024 55   Final   Office Visit on 02/26/2024   Component Date Value Ref Range Status    WBC 04/17/2024 4.26 (L)  4.31 - 10.16 Thousand/uL Final    RBC 04/17/2024 4.22  3.81 - 5.12 Million/uL Final    Hemoglobin 04/17/2024 12.8  11.5 - 15.4 g/dL Final    Hematocrit 04/17/2024 40.9  34.8 - 46.1 % Final    MCV 04/17/2024 97  82 - 98 fL Final    MCH 04/17/2024 30.3  26.8 - 34.3 pg Final    MCHC 04/17/2024 31.3 (L)  31.4 - 37.4 g/dL Final    RDW 04/17/2024 12.6  11.6 - 15.1 % Final    Platelets 04/17/2024 251  149 - 390 Thousands/uL Final    MPV 04/17/2024 11.1  8.9 - 12.7 fL Final    Sodium 04/17/2024 136  135 - 147 mmol/L Final    Potassium 04/17/2024 4.9  3.5 - 5.3 mmol/L Final    Chloride 04/17/2024 103  96 - 108 mmol/L Final    CO2 04/17/2024 28  21 - 32 mmol/L Final    ANION GAP 04/17/2024 5  4 - 13 mmol/L Final    BUN 04/17/2024 23  5 - 25 mg/dL Final    Creatinine 04/17/2024 0.79  0.60 - 1.30 mg/dL Final    Standardized to IDMS reference method    Glucose 04/17/2024 82  65 - 140 mg/dL Final    If the patient is fasting, the ADA then defines impaired fasting glucose as > 100 mg/dL and diabetes as > or equal to 123 mg/dL.    Calcium 04/17/2024 9.9  8.4 -  10.2 mg/dL Final    AST 04/17/2024 29  13 - 39 U/L Final    ALT 04/17/2024 22  7 - 52 U/L Final    Specimen collection should occur prior to Sulfasalazine administration due to the potential for falsely depressed results.     Alkaline Phosphatase 04/17/2024 85  34 - 104 U/L Final    Total Protein 04/17/2024 6.8  6.4 - 8.4 g/dL Final    Albumin 04/17/2024 4.1  3.5 - 5.0 g/dL Final    Total Bilirubin 04/17/2024 0.47  0.20 - 1.00 mg/dL Final    Use of this assay is not recommended for patients undergoing treatment with eltrombopag due to the potential for falsely elevated results.  N-acetyl-p-benzoquinone imine (metabolite of Acetaminophen) will generate erroneously low results in samples for patients that have taken an overdose of Acetaminophen.    eGFR 04/17/2024 72  ml/min/1.73sq m Final    Cholesterol 04/17/2024 181  See Comment mg/dL Final    Cholesterol:         Pediatric <18 Years        Desirable          <170 mg/dL      Borderline High    170-199 mg/dL      High               >=200 mg/dL        Adult >=18 Years            Desirable         <200 mg/dL      Borderline High   200-239 mg/dL      High              >239 mg/dL      Triglycerides 04/17/2024 60  See Comment mg/dL Final    Triglyceride:     0-9Y            <75mg/dL     10Y-17Y         <90 mg/dL       >=18Y     Normal          <150 mg/dL     Borderline High 150-199 mg/dL     High            200-499 mg/dL        Very High       >499 mg/dL    Specimen collection should occur prior to Metamizole administration due to the potential for falsely depressed results.    HDL, Direct 04/17/2024 83  >=50 mg/dL Final    LDL Calculated 04/17/2024 86  0 - 100 mg/dL Final    LDL Cholesterol:     Optimal           <100 mg/dl     Near Optimal      100-129 mg/dl     Above Optimal       Borderline High 130-159 mg/dl       High            160-189 mg/dl       Very High       >189 mg/dl         This screening LDL is a calculated result.   It does not have the accuracy of the  Direct Measured LDL in the monitoring of patients with hyperlipidemia and/or statin therapy.   Direct Measure LDL (POT948) must be ordered separately in these patients.    Non-HDL-Chol (CHOL-HDL) 04/17/2024 98  mg/dl Final    TSH 3RD GENERATON 04/17/2024 2.393  0.450 - 4.500 uIU/mL Final    The recommended reference ranges for TSH during pregnancy are as follows:   First trimester 0.100 to 2.500 uIU/mL   Second trimester  0.200 to 3.000 uIU/mL   Third trimester 0.300 to 3.000 uIU/m    Note: Normal ranges may not apply to patients who are transgender, non-binary, or whose legal sex, sex at birth, and gender identity differ.  Adult TSH (3rd generation) reference range follows the recommended guidelines of the American Thyroid Association, January, 2020.

## 2024-06-26 LAB — ANA TITR SER IF: NEGATIVE {TITER}

## 2024-07-18 ENCOUNTER — OFFICE VISIT (OUTPATIENT)
Dept: FAMILY MEDICINE CLINIC | Facility: HOSPITAL | Age: 79
End: 2024-07-18
Payer: MEDICARE

## 2024-07-18 VITALS
DIASTOLIC BLOOD PRESSURE: 70 MMHG | BODY MASS INDEX: 21.86 KG/M2 | TEMPERATURE: 97 F | HEART RATE: 73 BPM | WEIGHT: 123.4 LBS | HEIGHT: 63 IN | SYSTOLIC BLOOD PRESSURE: 104 MMHG | OXYGEN SATURATION: 100 %

## 2024-07-18 DIAGNOSIS — I73.00 RAYNAUD'S DISEASE WITHOUT GANGRENE: ICD-10-CM

## 2024-07-18 DIAGNOSIS — E55.9 VITAMIN D DEFICIENCY: ICD-10-CM

## 2024-07-18 DIAGNOSIS — I10 PRIMARY HYPERTENSION: Primary | ICD-10-CM

## 2024-07-18 DIAGNOSIS — K21.9 GASTROESOPHAGEAL REFLUX DISEASE, UNSPECIFIED WHETHER ESOPHAGITIS PRESENT: ICD-10-CM

## 2024-07-18 PROBLEM — R13.19 ESOPHAGEAL DYSPHAGIA: Status: RESOLVED | Noted: 2023-03-14 | Resolved: 2024-07-18

## 2024-07-18 PROCEDURE — 99214 OFFICE O/P EST MOD 30 MIN: CPT | Performed by: INTERNAL MEDICINE

## 2024-07-18 PROCEDURE — G2211 COMPLEX E/M VISIT ADD ON: HCPCS | Performed by: INTERNAL MEDICINE

## 2024-07-18 NOTE — PROGRESS NOTES
Ambulatory Visit  Name: Luisa Granados      : 1945      MRN: [id number]  Encounter Provider: Sherry Kc DO  Encounter Date: 2024   Encounter department: [facility] [id number]     Assessment & Plan   1. Primary hypertension  Assessment & Plan:  Bp at goal, con't current meds, HR a bit elevated in evening just before she takes her Metoprolol - can try switching beta blocker to am and CCB in pm but HR below 100 and not concerning  - call with elevated BP > 140/90, recheck in 6 mos or so  Orders:  -     CBC and differential; Future; Expected date: 2025  -     Comprehensive metabolic panel; Future; Expected date: 2025  -     Lipid panel; Future; Expected date: 2025  -     TSH, 3rd generation with Free T4 reflex; Future; Expected date: 2025  -     Vitamin D 25 hydroxy; Future; Expected date: 2025  2. Raynaud's disease without gangrene  Assessment & Plan:  Currently asymptomatic with summer mos, on CCB, saw Rheum and rheum labs nml except mild elevation of CRP, has topical Nitro to use when symptoms are bad, did discuss poss Sildenafil if not controlled in winter mos, call with pain/ulcers/worse symptoms  Orders:  -     CBC and differential; Future; Expected date: 2025  -     Comprehensive metabolic panel; Future; Expected date: 2025  -     Lipid panel; Future; Expected date: 2025  -     TSH, 3rd generation with Free T4 reflex; Future; Expected date: 2025  -     Vitamin D 25 hydroxy; Future; Expected date: 2025  3. Gastroesophageal reflux disease, unspecified whether esophagitis present  Assessment & Plan:  Symptoms well controlled with Pepcid q day, avoid triggers, call with new/worse symptoms  Orders:  -     CBC and differential; Future; Expected date: 2025  -     Comprehensive metabolic panel; Future; Expected date: 2025  -     Lipid panel; Future; Expected date: 2025  -     TSH, 3rd generation with  Free T4 reflex; Future; Expected date: 02/01/2025  -     Vitamin D 25 hydroxy; Future; Expected date: 02/01/2025  4. Vitamin D deficiency  Assessment & Plan:  Started Vit D supplement and noting no SE, check levels with labs before next appt - BW order in Epic   Orders:  -     Vitamin D 25 hydroxy; Future; Expected date: 02/01/2025     Colonoscopy 4/23 - 5 yrs    Mammo 2/24    Dexa 2/24 - osteopenia - restarted Ca/Vit D    BW 4/24    Taking 21 yr old grandson [place]        History of Present Illness     HPI Pt here for follow up appt    BP at goal today and meds were reviewed and no changes have occurred.  She denies missing doses of meds or SE with the meds.  She check her BP outside the office.  She notes no frequent HA's/dizziness/double vision/CP.     Pt saw Rheum (Dr. Cope) in June for f/u Raynaud's - OV note reviewed.  Rheum labs were ordered and she was given a rx for nitro oint bid when symptoms are bad.  Rheum labs reviewed - CRP mildly elevated at 8.7.  She has a f/u in Feb.     Pt notes no current reflux symptoms. She feels the throat clearing sensation has improved with the Pepcid qhs.  She notes no pyrosis/regurgitation of food/abd pain/swallowing issues/blood in stools/black stools.       Review of Systems   Constitutional:  Negative for chills and fever.   HENT:  Negative for congestion and trouble swallowing.    Eyes:  Negative for pain and visual disturbance.   Respiratory:  Negative for cough and shortness of breath.    Cardiovascular:  Negative for chest pain and palpitations.   Gastrointestinal:  Negative for abdominal pain, blood in stool, constipation, diarrhea, nausea and vomiting.   Genitourinary:  Negative for difficulty urinating and dysuria.   Musculoskeletal:  Negative for back pain.        Leg cramps at night   Skin:  Negative for rash and wound.   Neurological:  Negative for dizziness, light-headedness and headaches.   Hematological:  Does not bruise/bleed easily.  "  Psychiatric/Behavioral:  Negative for confusion.        Objective     /70   Pulse 73   Temp (!) 97 °F (36.1 °C)   Ht 5' 3\" (1.6 m)   Wt 56 kg (123 lb 6.4 oz)   LMP  (LMP Unknown)   SpO2 100%   BMI 21.86 kg/m²     Physical Exam  Vitals and nursing note reviewed.   Constitutional:       General: She is not in acute distress.     Appearance: She is well-developed. She is not ill-appearing.   HENT:      Head: Normocephalic and atraumatic.      Right Ear: External ear normal.      Left Ear: External ear normal.   Eyes:      General:         Right eye: No discharge.         Left eye: No discharge.      Conjunctiva/sclera: Conjunctivae normal.   Neck:      Thyroid: No thyromegaly.      Trachea: No tracheal deviation.   Cardiovascular:      Rate and Rhythm: Normal rate and regular rhythm.      Heart sounds: Normal heart sounds. No murmur heard.  Pulmonary:      Effort: Pulmonary effort is normal. No respiratory distress.      Breath sounds: Normal breath sounds. No wheezing, rhonchi or rales.   Abdominal:      General: There is no distension.      Palpations: Abdomen is soft.      Tenderness: There is no abdominal tenderness. There is no guarding or rebound.   Musculoskeletal:         General: No deformity or signs of injury.      Cervical back: Neck supple.   Skin:     General: Skin is warm and dry.      Coloration: Skin is not pale.      Findings: No bruising or rash.   Neurological:      General: No focal deficit present.      Mental Status: She is alert. Mental status is at baseline.      Motor: No abnormal muscle tone.      Gait: Gait normal.   Psychiatric:         Mood and Affect: Mood normal.         Behavior: Behavior normal.         Thought Content: Thought content normal.         Judgment: Judgment normal.       Administrative Statements           "

## 2024-07-18 NOTE — ASSESSMENT & PLAN NOTE
Started Vit D supplement and noting no SE, check levels with labs before next appt - BW order in Epic

## 2024-07-18 NOTE — ASSESSMENT & PLAN NOTE
Bp at goal, con't current meds, HR a bit elevated in evening just before she takes her Metoprolol - can try switching beta blocker to am and CCB in pm but HR below 100 and not concerning  - call with elevated BP > 140/90, recheck in 6 mos or so

## 2024-07-18 NOTE — ASSESSMENT & PLAN NOTE
Currently asymptomatic with summer mos, on CCB, saw Rheum and rheum labs nml except mild elevation of CRP, has topical Nitro to use when symptoms are bad, did discuss poss Sildenafil if not controlled in winter mos, call with pain/ulcers/worse symptoms

## 2024-07-19 ENCOUNTER — OFFICE VISIT (OUTPATIENT)
Dept: PODIATRY | Facility: CLINIC | Age: 79
End: 2024-07-19
Payer: MEDICARE

## 2024-07-19 VITALS
SYSTOLIC BLOOD PRESSURE: 90 MMHG | HEIGHT: 63 IN | WEIGHT: 123.2 LBS | DIASTOLIC BLOOD PRESSURE: 60 MMHG | BODY MASS INDEX: 21.83 KG/M2

## 2024-07-19 DIAGNOSIS — I73.00 RAYNAUD'S DISEASE WITHOUT GANGRENE: ICD-10-CM

## 2024-07-19 DIAGNOSIS — M20.41 HAMMERTOE OF RIGHT FOOT: ICD-10-CM

## 2024-07-19 DIAGNOSIS — L84 CORNS: Primary | ICD-10-CM

## 2024-07-19 DIAGNOSIS — M77.41 METATARSALGIA OF RIGHT FOOT: ICD-10-CM

## 2024-07-19 PROCEDURE — 99213 OFFICE O/P EST LOW 20 MIN: CPT | Performed by: PODIATRIST

## 2024-07-19 NOTE — PROGRESS NOTES
Patient ID: Luisa Granados is a 79 y.o. female Date of Birth 1945       No chief complaint on file.            Diagnosis:  1. Corns  2. Raynaud's disease without gangrene  3. Hammertoe of right foot    Pedal examination with socks and shoes removed bilaterally.  Right foot with plantar prominent metatarsal head 3 with hyperkeratosis no foreign body, hpk was trimmed and patient was given a metatarsal pad to and instructed how to apply it to her sneaker.  Hyperkeratosis right fifth toe, right third toe were trimmed without incident, silicone toe sleeve was dispensed for her right fifth toe, she states it feels much better with this.  Patient to continue following with PCP for Raynaud's therapy/treatment with amlodipine which she is doing very well.  Today we discussed wider toebox, deeper toebox shoes to help accommodate her hammertoes.  She will follow-up as needed.          Subjective:   Jerri presents today for evaluation and care of possible foreign body in the pad of her left foot under her toes, she is hoping it is just a corn.  She states since she was last seen by me she is following with her PCP and now on amlodipine for Raynaud's, she states the symptoms are getting better, she is not sure if it is due to nicer weather or from medication, PCP thinks it is probably combination of both.  No new complaints.        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    2/13/13 - Hemorrhoids, repeat 5 years d/t family h/o colon cancer    EYE SURGERY  2017/2018    Cataracts surgery OU, bilateral iStents    FOOT SURGERY      mortons neuroma    MOUTH SURGERY      Tooth Extraction - last assessed: Aug 19, 2014    MOUTH SURGERY N/A 11/2017    OH AMPUTATION TOE METATARSOPHALANGEAL JOINT Right 10/28/2022    Procedure: AMPUTATION TOE Right Second;  Surgeon: Drerick Mondragon DPM;  Location:  MAIN OR;  Service: Podiatry    SKIN BIOPSY Right 07/2018    right thigh    TONSILLECTOMY      last assessed: Aug 19, 2014       Social History     Socioeconomic History    Marital status: /Civil Union     Spouse name: Not on file    Number of children: 2    Years of education: Not on file    Highest education level: Not on file   Occupational History    Not on file   Tobacco Use    Smoking status: Never    Smokeless tobacco: Never   Vaping Use    Vaping status: Never Used   Substance and Sexual Activity    Alcohol use: Yes     Alcohol/week: 5.0 standard drinks of alcohol     Types: 5 Glasses of wine per week     Comment: 1 per day - social/ drinks wine     Drug use: No    Sexual activity: Yes     Partners: Male     Birth control/protection: Post-menopausal, Male Sterilization   Other Topics Concern    Not on file   Social History Narrative    Always uses seat belt     Caffeine use     [facility]      Social Determinants of Health     Financial Resource Strain: Low Risk  (2/26/2024)    Overall Financial Resource Strain (CARDIA)     Difficulty of Paying Living Expenses: Not hard at all   Food Insecurity: Not on file   Transportation Needs: No Transportation Needs (2/26/2024)    PRAPARE - Transportation     Lack of  Transportation (Medical): No     Lack of Transportation (Non-Medical): No   Physical Activity: Not on file   Stress: Not on file   Social Connections: Not on file   Intimate Partner Violence: Not on file   Housing Stability: Not on file          Current Outpatient Medications:     amLODIPine (NORVASC) 5 mg tablet, TAKE 1 TABLET (5 MG TOTAL) BY MOUTH DAILY., Disp: 90 tablet, Rfl: 1    betamethasone, augmented, (DIPROLENE) 0.05 % ointment, , Disp: , Rfl: 1    cetirizine (ZyrTEC) 5 MG tablet, Take 2.5 mg by mouth daily 1/2 tablet, Disp: , Rfl:     estradiol (Yuvafem) 10 MCG TABS vaginal tablet, Insert 1 tablet (10 mcg total) into the vagina 2 (two) times a week at bedtime, Disp: 24 tablet, Rfl: 3    famotidine (PEPCID) 40 MG tablet, TAKE 1 TABLET BY MOUTH DAILY AT BEDTIME, Disp: 90 tablet, Rfl: 2    latanoprost (XALATAN) 0.005 % ophthalmic solution, INSTILL 1 DROP INTO BOTH EYES AT BEDTIME, Disp: , Rfl:     metoprolol succinate (TOPROL-XL) 25 mg 24 hr tablet, TAKE 1/2 TABLET BY MOUTH EVERY DAY, Disp: 45 tablet, Rfl: 1    nitroglycerin (NITRO-BID) 2 % ointment, Apply 1 inch topically 2 (two) times a day, Disp: 30 g, Rfl: 6    zolpidem (AMBIEN) 5 mg tablet, TAKE 1/2 TO 1 TABLET BY MOUTH DAILY AS NEEDED FOR SLEEP, Disp: 15 tablet, Rfl: 0    Calcium Citrate-Vitamin D (CALCIUM CITRATE +D PO), Take 1 tablet by mouth 2 (two) times a day, Disp: , Rfl:     VITAMIN D, CHOLECALCIFEROL, PO, Take 1 tablet by mouth in the morning, Disp: , Rfl:     Allergies  Patient has no known allergies.    Family History   Problem Relation Age of Onset    Colon polyps Mother     Colon cancer Mother         x2: colostomy; ileostomy    Endometrial cancer Mother         60's    Cancer Mother         Endometrial/ hysterectomy; [place]-rectal cancer w/ colostomy; Colon cancer w/ colostomy revision    Heart disease Mother         A Fib    Hearing loss Mother     Vision loss Mother         Macular degeneration    Lung cancer Father 88    Cancer Father          Lung    COPD Father         Long time smoker    Hypertension Sister         new onset 9/22    No Known Problems Maternal Grandmother     No Known Problems Maternal Grandfather     No Known Problems Paternal Grandmother     No Known Problems Paternal Grandfather     Colon cancer Brother 61        Colon cancer    Cancer Brother         Colon cancer    Colon cancer Maternal Aunt         unknown age of onset    No Known Problems Maternal Aunt     No Known Problems Paternal Aunt     No Known Problems Paternal Aunt     No Known Problems Paternal Aunt     No Known Problems Paternal Aunt     No Known Problems Paternal Aunt            Objective:  LMP  (LMP Unknown)     Review of Systems   Constitutional:  Negative for chills and fever.   HENT:  Negative for ear pain and sore throat.    Eyes:  Negative for pain and visual disturbance.   Respiratory:  Negative for cough and shortness of breath.    Cardiovascular:  Negative for chest pain and palpitations.   Gastrointestinal:  Negative for abdominal pain and vomiting.   Genitourinary:  Negative for dysuria and hematuria.   Musculoskeletal:  Negative for arthralgias and back pain.   Skin:  Negative for color change and rash.        Painful corn on right foot   Neurological:  Negative for seizures and syncope.   All other systems reviewed and are negative.      Physical Exam  Constitutional:       Appearance: Normal appearance. She is well-developed and normal weight.   HENT:      Head: Normocephalic and atraumatic.      Nose: Nose normal.      Mouth/Throat:      Mouth: Mucous membranes are moist.      Pharynx: Oropharynx is clear.   Eyes:      Conjunctiva/sclera: Conjunctivae normal.   Cardiovascular:      Pulses:           Dorsalis pedis pulses are 2+ on the right side and 2+ on the left side.        Posterior tibial pulses are 2+ on the right side and 2+ on the left side.   Pulmonary:      Effort: Pulmonary effort is normal.   Musculoskeletal:         General: Normal range  "of motion.      Cervical back: Normal range of motion.      Right lower leg: No edema.      Left lower leg: No edema.      Right foot: Deformity present.      Left foot: Deformity present.   Feet:      Right foot:      Protective Sensation: 10 sites tested.  10 sites sensed.      Toenail Condition: Right toenails are normal.      Left foot:      Protective Sensation: 10 sites tested.  10 sites sensed.      Toenail Condition: Left toenails are normal.      Comments: Prior second toe amputation right foot, dorsally dislocated rigid hammertoes 3, 4 of right foot with   corn distal lateral plantar tip third toe and medial fifth toe right foot.  Fat pad atrophy submetatarsal heads 1 through 5 and plantar prominent 3rd met head and IPK present  Pes cavus foot type bilateral  Toes are significantly warmer to touch with minimal rubor to distal tips, they are blanchable, capillary filling time is slightly sluggish, proximal to all IPJ's toes are warm and color is within normal limits.  Skin:     General: Skin is warm and dry.      Capillary Refill: Capillary refill takes less than 2 seconds.   Neurological:      General: No focal deficit present.      Mental Status: She is alert and oriented to person, place, and time. Mental status is at baseline.   Psychiatric:         Mood and Affect: Mood normal.         Behavior: Behavior normal.         Thought Content: Thought content normal.         Judgment: Judgment normal.                 No pertinent results found.      Camille Casas DPM, OMAR, FACFAS    Portions of the record may have been created with voice recognition software. Occasional wrong word or \"sound a like\" substitutions may have occurred due to the inherent limitations of voice recognition software. Read the chart carefully and recognize, using context, where substitutions have occurred.  "

## 2024-08-25 DIAGNOSIS — I73.00 RAYNAUD'S DISEASE WITHOUT GANGRENE: ICD-10-CM

## 2024-08-25 RX ORDER — AMLODIPINE BESYLATE 5 MG/1
5 TABLET ORAL DAILY
Qty: 90 TABLET | Refills: 1 | Status: SHIPPED | OUTPATIENT
Start: 2024-08-25

## 2024-09-13 ENCOUNTER — TELEPHONE (OUTPATIENT)
Age: 79
End: 2024-09-13

## 2024-09-13 NOTE — TELEPHONE ENCOUNTER
Patient is asking for providers recommendation on whether she should get the RSV vaccine this year, if provider recommends due to her other health dx's.  She is also asking what the appropriate time frame between each vaccine would be if they do go ahead and have the RSV vaccine as well as the Covid and Flu. Please note that patients do receive their vaccines from their local pharmacy, they are just looking for provider recommendation.  Please follow up with patient directly after consulting with provider thank you

## 2024-10-01 ENCOUNTER — OFFICE VISIT (OUTPATIENT)
Dept: FAMILY MEDICINE CLINIC | Facility: HOSPITAL | Age: 79
End: 2024-10-01
Payer: MEDICARE

## 2024-10-01 VITALS
BODY MASS INDEX: 22.29 KG/M2 | HEIGHT: 63 IN | HEART RATE: 93 BPM | DIASTOLIC BLOOD PRESSURE: 69 MMHG | SYSTOLIC BLOOD PRESSURE: 111 MMHG | TEMPERATURE: 97.4 F | OXYGEN SATURATION: 100 % | WEIGHT: 125.8 LBS

## 2024-10-01 DIAGNOSIS — I10 PRIMARY HYPERTENSION: ICD-10-CM
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
## 2024-10-01 PROCEDURE — 99214 OFFICE O/P EST MOD 30 MIN: CPT | Performed by: INTERNAL MEDICINE

## 2024-10-01 PROCEDURE — G2211 COMPLEX E/M VISIT ADD ON: HCPCS | Performed by: INTERNAL MEDICINE

## 2024-10-01 NOTE — PROGRESS NOTES
Ambulatory Visit  Name: Luisa Granados      : 1945      MRN: [id number]  Encounter Provider: Sherry Kc DO  Encounter Date: 10/1/2024   Encounter department: [facility] [address]     Assessment & Plan  Fall (on) (from) other stairs and steps, sequela  Stepped backward and fell down 1 step she didn't know was there, no preceding symptoms causing the fall and no LOC, no current s/sx of concussion and neuro exam nml, reassurance given, call with red flag Neuro symptoms, Tylenol and ice to back of head prn, call with recurrent falls       Primary hypertension  BP at goal, pt notes no preceding high or low BP prior to fall and notes BP has been good since fall, con't current meds, keep f/u appt already scheduled             Colonoscopy  - 5 yrs     Mammo     Dexa  - osteopenia - restarted Ca/Vit D     BW            History of Present Illness     HPI Pt here for an acute visit    Pt was visiting a friend and stepped backwards and didn't know there was a step there and fell down 1 step.  She landed on her back and did hit her head.  She had no LOC. She had some pain at the back of the head but no other complaints after the fall. She took some Tylenol x 1 and head pain resolved.  Since then she has had no HA's/dizziness/vision changes/memory impairment/mood swings/sleep issues/tearfulness.  She notes no other joint issues/back pain.  She notes no preceding dizziness/lightheadedness/CP/palp.            Review of Systems   Constitutional:  Negative for chills, fatigue and fever.   Eyes:  Negative for photophobia, pain and visual disturbance.   Respiratory:  Negative for cough and shortness of breath.    Cardiovascular:  Negative for chest pain and palpitations.   Gastrointestinal:  Negative for nausea and vomiting.   Genitourinary:  Negative for difficulty urinating and dysuria.   Musculoskeletal:  Negative for arthralgias, back pain and gait problem.   Skin:   "Negative for rash and wound.   Neurological:  Negative for dizziness, seizures, syncope, speech difficulty, weakness, numbness and headaches.   Hematological:  Does not bruise/bleed easily.   Psychiatric/Behavioral:  Negative for confusion, dysphoric mood and sleep disturbance.            Objective     /69 (BP Location: Left arm, Patient Position: Sitting, Cuff Size: Standard)   Pulse 93   Temp (!) 97.4 °F (36.3 °C) (Tympanic)   Ht 5' 3\" (1.6 m)   Wt 57.1 kg (125 lb 12.8 oz)   LMP  (LMP Unknown)   SpO2 100%   BMI 22.28 kg/m²     Physical Exam  Vitals and nursing note reviewed.   Constitutional:       General: She is not in acute distress.     Appearance: She is well-developed. She is not ill-appearing.   HENT:      Head: Normocephalic and atraumatic.      Right Ear: Tympanic membrane and external ear normal. There is no impacted cerumen.      Left Ear: Tympanic membrane and external ear normal. There is no impacted cerumen.      Mouth/Throat:      Mouth: Mucous membranes are moist.      Pharynx: Oropharynx is clear. No oropharyngeal exudate.   Eyes:      General:         Right eye: No discharge.         Left eye: No discharge.      Extraocular Movements: Extraocular movements intact.      Conjunctiva/sclera: Conjunctivae normal.      Pupils: Pupils are equal, round, and reactive to light.   Neck:      Thyroid: No thyromegaly.      Trachea: No tracheal deviation.   Cardiovascular:      Rate and Rhythm: Normal rate and regular rhythm.      Heart sounds: Normal heart sounds. No murmur heard.  Pulmonary:      Effort: Pulmonary effort is normal. No respiratory distress.      Breath sounds: Normal breath sounds. No wheezing, rhonchi or rales.   Musculoskeletal:         General: No deformity or signs of injury.      Cervical back: Neck supple.   Skin:     General: Skin is warm and dry.      Coloration: Skin is not pale.      Findings: No bruising or rash.   Neurological:      General: No focal deficit present. "      Mental Status: She is alert. Mental status is at baseline.      Cranial Nerves: No cranial nerve deficit.      Sensory: No sensory deficit.      Motor: No weakness or abnormal muscle tone.      Coordination: Coordination normal.      Gait: Gait normal.      Deep Tendon Reflexes: Reflexes normal.      Comments: CN II-XII intact, sensation intact to light touch globally, 5/5 global muscle strength globally, 2/4 patellar DTR B/L LE, nml FN and HS, neg Rhomberg, nml gait w/o assistance    Psychiatric:         Mood and Affect: Mood normal.         Behavior: Behavior normal.         Thought Content: Thought content normal.         Judgment: Judgment normal.

## 2024-10-01 NOTE — ASSESSMENT & PLAN NOTE
BP at goal, pt notes no preceding high or low BP prior to fall and notes BP has been good since fall, con't current meds, keep f/u appt already scheduled

## 2024-10-07 NOTE — PROGRESS NOTES
Assessment/Plan:    Primary hypertension  New dx of HTN - home cuff checked and correlates and is above goal, start Amlodipine 2 5 mg 1 tab PO q day, urged to con't checking BP at home and call if readings > 140/90 after 2 wks - will increase to 5 mg if still elevated, will check MIREYA d/t sudden abrupt onset - no s/sx of LUCIEN and labs not c/w hyperaldosteronism, will follow closely    Macrocytosis  Stable, no anemia noted       Diagnoses and all orders for this visit:    Primary hypertension  -     amLODIPine (NORVASC) 2 5 mg tablet; Take 1 tablet (2 5 mg total) by mouth daily  -     VAS renal artery complete; Future    Macrocytosis      Colonoscopy 1/18 - 5  Yrs     Mammo 2/22     Dexa 2/22 - osteopenia     BW 3/22  FLP 1/20    Subjective:      Patient ID: Zack Buck is a 78 y o  female  HPI Pt here for follow up appt and BW results    BW results were d/w pt in detail: CBC with WBC 3 52 and , rest of CBC/CMP/TSH was wnl    Last visit pt was evaluated for ED follow up for elevated BP w/o dx of HTN  She was counseled on avoiding NSAIDs as well as following a low sodium diet and regular exercise  She was urged to check BP at home and to bring home cuff and BP readings to appt today  She is here today for BP check  BP at goal today and meds were reviewed and med list is UTD  She remains off all BP meds  She has been checking her BP outside the office  Home numbers and cuff were brought in today and reviewed  Home numbers ranging from 114/95 to 177/102 with an average home reading of mid 150-160's/low 90's  Home cuff brought in today and while in the office it read 167/88 and right after our cuff read 166/86  She notes no frequent HA's/dizziness/double vision/CP  She does snore but notes no noted apnea           Review of Systems   Constitutional: Negative for chills, fatigue, fever and unexpected weight change  Eyes: Negative for pain and visual disturbance     Respiratory: Negative for cough and Pt ambulatory to bathroom at this time to provide urine specimen.    shortness of breath  Cardiovascular: Negative for chest pain, palpitations and leg swelling  Genitourinary: Negative for dysuria  Skin: Negative for rash and wound  Neurological: Negative for dizziness and headaches  Objective:    /80   Pulse 98   Temp (!) 97 2 °F (36 2 °C) (Tympanic)   Ht 5' 3" (1 6 m)   Wt 54 8 kg (120 lb 12 8 oz)   LMP  (LMP Unknown)   BMI 21 40 kg/m²      Physical Exam  Vitals and nursing note reviewed  Constitutional:       General: She is not in acute distress  Appearance: She is well-developed  She is not ill-appearing  HENT:      Head: Normocephalic and atraumatic  Eyes:      General:         Right eye: No discharge  Left eye: No discharge  Conjunctiva/sclera: Conjunctivae normal    Neck:      Trachea: No tracheal deviation  Cardiovascular:      Rate and Rhythm: Normal rate and regular rhythm  Heart sounds: Normal heart sounds  No murmur heard  No friction rub  Pulmonary:      Effort: Pulmonary effort is normal  No respiratory distress  Breath sounds: Normal breath sounds  No wheezing or rales  Musculoskeletal:      Cervical back: Neck supple  Right lower leg: No edema  Left lower leg: No edema  Skin:     General: Skin is warm  Coloration: Skin is not pale  Findings: No rash  Neurological:      General: No focal deficit present  Mental Status: She is alert  Motor: No abnormal muscle tone  Gait: Gait normal    Psychiatric:         Mood and Affect: Mood normal          Behavior: Behavior normal          Thought Content:  Thought content normal          Judgment: Judgment normal

## 2024-10-29 DIAGNOSIS — F51.01 PRIMARY INSOMNIA: ICD-10-CM

## 2024-10-29 RX ORDER — ZOLPIDEM TARTRATE 5 MG/1
TABLET ORAL
Qty: 15 TABLET | Refills: 0 | Status: SHIPPED | OUTPATIENT
Start: 2024-10-29

## 2024-12-05 DIAGNOSIS — I10 PRIMARY HYPERTENSION: ICD-10-CM

## 2024-12-05 RX ORDER — METOPROLOL SUCCINATE 25 MG/1
12.5 TABLET, EXTENDED RELEASE ORAL DAILY
Qty: 45 TABLET | Refills: 1 | Status: SHIPPED | OUTPATIENT
Start: 2024-12-05

## 2025-02-16 DIAGNOSIS — K21.9 GASTROESOPHAGEAL REFLUX DISEASE, UNSPECIFIED WHETHER ESOPHAGITIS PRESENT: ICD-10-CM

## 2025-02-17 ENCOUNTER — HOSPITAL ENCOUNTER (OUTPATIENT)
Dept: MAMMOGRAPHY | Facility: CLINIC | Age: 80
Discharge: HOME | End: 2025-02-17
Payer: MEDICARE

## 2025-02-17 ENCOUNTER — APPOINTMENT (OUTPATIENT)
Dept: LAB | Facility: CLINIC | Age: 80
End: 2025-02-17
Payer: MEDICARE

## 2025-02-17 VITALS — BODY MASS INDEX: 22.15 KG/M2 | HEIGHT: 63 IN | WEIGHT: 125 LBS

## 2025-02-17 DIAGNOSIS — K21.9 GASTROESOPHAGEAL REFLUX DISEASE, UNSPECIFIED WHETHER ESOPHAGITIS PRESENT: ICD-10-CM

## 2025-02-17 DIAGNOSIS — I73.00 RAYNAUD'S DISEASE WITHOUT GANGRENE: ICD-10-CM

## 2025-02-17 DIAGNOSIS — Z12.31 ENCOUNTER FOR SCREENING MAMMOGRAM FOR BREAST CANCER: ICD-10-CM

## 2025-02-17 DIAGNOSIS — E55.9 VITAMIN D DEFICIENCY: ICD-10-CM

## 2025-02-17 DIAGNOSIS — I10 PRIMARY HYPERTENSION: ICD-10-CM

## 2025-02-17 LAB
25(OH)D3 SERPL-MCNC: 59 NG/ML (ref 30–100)
ALBUMIN SERPL BCG-MCNC: 4 G/DL (ref 3.5–5)
ALP SERPL-CCNC: 88 U/L (ref 34–104)
ALT SERPL W P-5'-P-CCNC: 32 U/L (ref 7–52)
ANION GAP SERPL CALCULATED.3IONS-SCNC: 9 MMOL/L (ref 4–13)
AST SERPL W P-5'-P-CCNC: 32 U/L (ref 13–39)
BASOPHILS # BLD AUTO: 0.06 THOUSANDS/ΜL (ref 0–0.1)
BASOPHILS NFR BLD AUTO: 1 % (ref 0–1)
BILIRUB SERPL-MCNC: 0.6 MG/DL (ref 0.2–1)
BUN SERPL-MCNC: 20 MG/DL (ref 5–25)
CALCIUM SERPL-MCNC: 10.5 MG/DL (ref 8.4–10.2)
CHLORIDE SERPL-SCNC: 100 MMOL/L (ref 96–108)
CHOLEST SERPL-MCNC: 191 MG/DL (ref ?–200)
CO2 SERPL-SCNC: 30 MMOL/L (ref 21–32)
CREAT SERPL-MCNC: 0.91 MG/DL (ref 0.6–1.3)
EOSINOPHIL # BLD AUTO: 0.21 THOUSAND/ΜL (ref 0–0.61)
EOSINOPHIL NFR BLD AUTO: 4 % (ref 0–6)
ERYTHROCYTE [DISTWIDTH] IN BLOOD BY AUTOMATED COUNT: 13.2 % (ref 11.6–15.1)
GFR SERPL CREATININE-BSD FRML MDRD: 60 ML/MIN/1.73SQ M
GLUCOSE P FAST SERPL-MCNC: 88 MG/DL (ref 65–99)
HCT VFR BLD AUTO: 45.9 % (ref 34.8–46.1)
HDLC SERPL-MCNC: 84 MG/DL
HGB BLD-MCNC: 14.8 G/DL (ref 11.5–15.4)
IMM GRANULOCYTES # BLD AUTO: 0.02 THOUSAND/UL (ref 0–0.2)
IMM GRANULOCYTES NFR BLD AUTO: 0 % (ref 0–2)
LDLC SERPL CALC-MCNC: 94 MG/DL (ref 0–100)
LYMPHOCYTES # BLD AUTO: 0.9 THOUSANDS/ΜL (ref 0.6–4.47)
LYMPHOCYTES NFR BLD AUTO: 18 % (ref 14–44)
MCH RBC QN AUTO: 31.5 PG (ref 26.8–34.3)
MCHC RBC AUTO-ENTMCNC: 32.2 G/DL (ref 31.4–37.4)
MCV RBC AUTO: 98 FL (ref 82–98)
MONOCYTES # BLD AUTO: 0.46 THOUSAND/ΜL (ref 0.17–1.22)
MONOCYTES NFR BLD AUTO: 9 % (ref 4–12)
NEUTROPHILS # BLD AUTO: 3.33 THOUSANDS/ΜL (ref 1.85–7.62)
NEUTS SEG NFR BLD AUTO: 68 % (ref 43–75)
NONHDLC SERPL-MCNC: 107 MG/DL
NRBC BLD AUTO-RTO: 0 /100 WBCS
PLATELET # BLD AUTO: 233 THOUSANDS/UL (ref 149–390)
PMV BLD AUTO: 11.4 FL (ref 8.9–12.7)
POTASSIUM SERPL-SCNC: 5 MMOL/L (ref 3.5–5.3)
PROT SERPL-MCNC: 6.4 G/DL (ref 6.4–8.4)
RBC # BLD AUTO: 4.7 MILLION/UL (ref 3.81–5.12)
SODIUM SERPL-SCNC: 139 MMOL/L (ref 135–147)
TRIGL SERPL-MCNC: 65 MG/DL (ref ?–150)
TSH SERPL DL<=0.05 MIU/L-ACNC: 2.3 UIU/ML (ref 0.45–4.5)
WBC # BLD AUTO: 4.98 THOUSAND/UL (ref 4.31–10.16)

## 2025-02-17 PROCEDURE — 85025 COMPLETE CBC W/AUTO DIFF WBC: CPT

## 2025-02-17 PROCEDURE — 80053 COMPREHEN METABOLIC PANEL: CPT

## 2025-02-17 PROCEDURE — 82306 VITAMIN D 25 HYDROXY: CPT

## 2025-02-17 PROCEDURE — 84443 ASSAY THYROID STIM HORMONE: CPT

## 2025-02-17 PROCEDURE — 80061 LIPID PANEL: CPT

## 2025-02-17 PROCEDURE — 77063 BREAST TOMOSYNTHESIS BI: CPT

## 2025-02-17 PROCEDURE — 77067 SCR MAMMO BI INCL CAD: CPT

## 2025-02-17 PROCEDURE — 36415 COLL VENOUS BLD VENIPUNCTURE: CPT

## 2025-02-17 RX ORDER — FAMOTIDINE 40 MG/1
40 TABLET, FILM COATED ORAL
Qty: 90 TABLET | Refills: 2 | Status: SHIPPED | OUTPATIENT
Start: 2025-02-17

## 2025-02-18 ENCOUNTER — RESULTS FOLLOW-UP (OUTPATIENT)
Dept: FAMILY MEDICINE CLINIC | Facility: HOSPITAL | Age: 80
End: 2025-02-18

## 2025-02-18 DIAGNOSIS — E83.52 HYPERCALCEMIA: Primary | ICD-10-CM

## 2025-02-18 DIAGNOSIS — N95.2 VAGINAL ATROPHY: ICD-10-CM

## 2025-02-18 RX ORDER — ESTRADIOL 10 UG/1
TABLET VAGINAL
Qty: 24 TABLET | Refills: 0 | Status: SHIPPED | OUTPATIENT
Start: 2025-02-18

## 2025-02-24 ENCOUNTER — ANNUAL EXAM (OUTPATIENT)
Dept: GYNECOLOGY | Facility: CLINIC | Age: 80
End: 2025-02-24
Payer: MEDICARE

## 2025-02-24 VITALS
DIASTOLIC BLOOD PRESSURE: 76 MMHG | HEIGHT: 63 IN | BODY MASS INDEX: 22.5 KG/M2 | WEIGHT: 127 LBS | SYSTOLIC BLOOD PRESSURE: 120 MMHG

## 2025-02-24 DIAGNOSIS — N95.2 VAGINAL ATROPHY: ICD-10-CM

## 2025-02-24 DIAGNOSIS — Z01.419 ENCOUNTER FOR ANNUAL ROUTINE GYNECOLOGICAL EXAMINATION: Primary | ICD-10-CM

## 2025-02-24 DIAGNOSIS — Z12.31 ENCOUNTER FOR SCREENING MAMMOGRAM FOR BREAST CANCER: ICD-10-CM

## 2025-02-24 PROCEDURE — G0101 CA SCREEN;PELVIC/BREAST EXAM: HCPCS | Performed by: OBSTETRICS & GYNECOLOGY

## 2025-02-24 RX ORDER — ESTRADIOL 10 UG/1
1 TABLET, FILM COATED VAGINAL 2 TIMES WEEKLY
Qty: 24 TABLET | Refills: 3 | Status: SHIPPED | OUTPATIENT
Start: 2025-02-24

## 2025-02-24 NOTE — PROGRESS NOTES
Name: Luisa Granados      : 1945      MRN: [id number]  Encounter Provider: Basia Marquez DO  Encounter Date: 2025   Encounter department: [facility]  :  Assessment & Plan  Encounter for annual routine gynecological examination         Encounter for screening mammogram for breast cancer    Orders:    Mammo screening bilateral w 3d and cad; Future    Vaginal atrophy    Orders:    estradiol (Yuvafem) 10 MCG TABS vaginal tablet; Insert 1 tablet (10 mcg total) into the vagina 2 (two) times a week    She does not require a Pap    mammogram reviewed with her including breast density.  RX given for next year  Discussed self breast exams    colon cancer screening-colonoscopy in , she is due next year.    No sign of vulvar yeast at this time-she will get clotrimazole cream and next time she has itching she will use this twice a week for 7 days.  She will call if no relief    Will order DEXA next year    Vaginal atrophy-the Yuvafem is working well for her and I renewed her prescription.  It is very expensive.  We looked into Estring but that is much more expensive.  The estradiol cream has better coverage but she has tried this in the past and did not like it    Incontinence-I recommended that she do Kegels and I gave her instructions.    discussed preventive care, regular exercise and a healthy diet      History of Present Illness   HPI  Luisa Granados is a 80 y.o. female who presents for yearly.  She has been using generic Vagifem with good results.  She has been having some vulvar and inner thigh itching.  No discharge or vaginal itching.  She also notes incontinence but she cannot tell when she is leaking urine.  She denies urgency but does not notice it when she laughs, coughs or sneezes      Normal 3D mammogram last week with scattered fibroglandular densities and average risk      Review of Systems   Constitutional: Negative.    Gastrointestinal: Negative.    Genitourinary:  Negative.           Objective   LMP  (LMP Unknown)      Physical Exam  Vitals and nursing note reviewed. Exam conducted with a chaperone present.   Constitutional:       Appearance: She is well-developed.   HENT:      Head: Normocephalic and atraumatic.   Neck:      Thyroid: No thyromegaly.   Cardiovascular:      Rate and Rhythm: Normal rate and regular rhythm.   Pulmonary:      Effort: Pulmonary effort is normal.      Breath sounds: Normal breath sounds.   Chest:   Breasts:     Right: Normal.      Left: Normal.      Comments: Examined seated and supine  Abdominal:      Palpations: Abdomen is soft.   Genitourinary:     General: Normal vulva.      Vagina: Normal.      Cervix: Normal.      Uterus: Normal.       Adnexa: Right adnexa normal and left adnexa normal.      Rectum: Normal.      Comments: No sign of yeast at this time  Musculoskeletal:      Cervical back: Neck supple.   Skin:     General: Skin is warm and dry.   Neurological:      Mental Status: She is alert.   Psychiatric:         Mood and Affect: Mood normal.

## 2025-03-03 ENCOUNTER — APPOINTMENT (OUTPATIENT)
Dept: LAB | Facility: CLINIC | Age: 80
End: 2025-03-03
Payer: MEDICARE

## 2025-03-03 DIAGNOSIS — E83.52 HYPERCALCEMIA: ICD-10-CM

## 2025-03-03 LAB — CA-I BLD-SCNC: 1.24 MMOL/L (ref 1.12–1.32)

## 2025-03-03 PROCEDURE — 36415 COLL VENOUS BLD VENIPUNCTURE: CPT

## 2025-03-03 PROCEDURE — 82330 ASSAY OF CALCIUM: CPT

## 2025-03-05 DIAGNOSIS — I73.00 RAYNAUD'S DISEASE WITHOUT GANGRENE: ICD-10-CM

## 2025-03-05 RX ORDER — AMLODIPINE BESYLATE 5 MG/1
5 TABLET ORAL DAILY
Qty: 90 TABLET | Refills: 1 | Status: SHIPPED | OUTPATIENT
Start: 2025-03-05

## 2025-03-10 ENCOUNTER — TELEMEDICINE (OUTPATIENT)
Dept: FAMILY MEDICINE CLINIC | Facility: HOSPITAL | Age: 80
End: 2025-03-10
Payer: MEDICARE

## 2025-03-10 ENCOUNTER — TELEPHONE (OUTPATIENT)
Age: 80
End: 2025-03-10

## 2025-03-10 DIAGNOSIS — U07.1 COVID-19: Primary | ICD-10-CM

## 2025-03-10 PROCEDURE — 99213 OFFICE O/P EST LOW 20 MIN: CPT | Performed by: NURSE PRACTITIONER

## 2025-03-10 PROCEDURE — G2211 COMPLEX E/M VISIT ADD ON: HCPCS | Performed by: NURSE PRACTITIONER

## 2025-03-10 RX ORDER — NIRMATRELVIR AND RITONAVIR 300-100 MG
3 KIT ORAL 2 TIMES DAILY
Qty: 30 TABLET | Refills: 0 | Status: SHIPPED | OUTPATIENT
Start: 2025-03-10 | End: 2025-03-15

## 2025-03-10 NOTE — TELEPHONE ENCOUNTER
Pt stated it $628 for Paxlovid - she's just gonna ride it out.  Did we know it's that expensive?  She has a call into pharmacy to see if any discounts.  She'll update us if she's going to get it or not.

## 2025-03-10 NOTE — PROGRESS NOTES
COVID-19 Outpatient Progress Note  Name: Luisa Granados      : 1945      MRN: [id number]  Encounter Provider: MADY Torres  Encounter Date: 3/10/2025   Encounter department: [facility] [address]   :  Assessment & Plan  COVID-19    Orders:    nirmatrelvir & ritonavir (Paxlovid, 300/100,) tablet therapy pack; Take 3 tablets by mouth 2 (two) times a day for 5 days Take 2 nirmatrelvir tablets + 1 ritonavir tablet together per dose        Disposition:     Patient was advised that they can go back to your normal activities when, for at least 24 hours, both are true: their symptoms are getting better overall and they have not had a fever (and are not using fever-reducing medication).    When going back to your normal activities, take added precaution over the next 5 days, such as taking additional steps for  air, hygiene, masks, physical distancing, and/or testing when you will be around other people indoors. You may still be able to spread the virus that made you sick, even if you are feeling better.    If patient develops a fever or starts to feel worse after they have gone back to normal activities, stay home and away from others again until, for at least 24 hours, both are true: symptoms are improving overall and they have not had a fever (and are not using fever-reducing medication). Then take added precaution for the next 5 days.      Discussed symptom directed medication options with patient. Discussed vitamin D, vitamin C, and/or zinc supplementation with patient.     Patient meets criteria for Paxlovid and they have been counseled appropriately regarding risks, benefits, side effects, and alternative treatment options. After discussion, patient agrees to treatment.    Possible side effects of Paxlovid?    Possible side effects of Paxlovid are:  - Liver Problems. Notify us right away if you start to experience loss of appetite, yellowing of your skin and the whites of  "eyes (jaundice), dark-colored urine, pale colored stools and itchy skin, stomach area (abdominal) pain.  - Resistance to HIV Medicines. If you have untreated HIV infection, Paxlovid may lead to some HIV medicines not working as well in the future.  - Other possible side effects include: altered sense of taste, diarrhea, high blood pressure, or muscle aches.           Recent Visits  No visits were found meeting these conditions.  Showing recent visits within past 7 days and meeting all other requirements  Today's Visits  Date Type Provider Dept   03/10/25 Telemedicine MADY Torres [facility] [address]   Showing today's visits and meeting all other requirements  Future Appointments  No visits were found meeting these conditions.  Showing future appointments within next 150 days and meeting all other requirements    History of Present Illness             Subjective:   Luisa Granados is a 80 y.o. female who has been screened for COVID-19. Symptom change since last report: unchanged. Patient's symptoms include fatigue, nasal congestion, cough and myalgias. Patient denies shortness of breath.     - Date of symptom onset: 3/5/2025  - Date of positive COVID-19 test: 3/10/2025. Type of test: Home antigen. Patient with typical symptoms of COVID-19 and they attest that they were positive on home rapid antigen testing. Image of positive result is not able to be uploaded into their chart.     COVID-19 vaccination status: Fully vaccinated with booster        Wearing a mask when leaving room?: is not          No results found for: \"SARSCOV2\", \"[id number]\", \"SARSCORONAVI\", \"CORONAVIRUSR\", \"SARSCOVAG\", \"SARSCOVAGH\"      Review of Systems   Constitutional:  Positive for fatigue. Negative for appetite change.   HENT:  Positive for congestion.    Respiratory:  Positive for cough. Negative for shortness of breath.    Musculoskeletal:  Positive for myalgias.         Objective   LMP  (LMP Unknown)     Physical " Exam  Vitals reviewed.   Constitutional:       General: She is not in acute distress.  Pulmonary:      Effort: Pulmonary effort is normal. No respiratory distress.      Comments: No cough  Neurological:      General: No focal deficit present.      Mental Status: She is alert and oriented to person, place, and time.   Psychiatric:         Mood and Affect: Mood normal.         Behavior: Behavior normal.         Administrative Statements   Encounter provider MADY Torres    The Patient is located at Home and in the following state in which I hold an active license [place].    The patient was identified by name and date of birth. Luisa Granados was informed that this is a telemedicine visit and that the visit is being conducted through the Epic Embedded platform. She agrees to proceed..  My office door was closed. No one else was in the room.  She acknowledged consent and understanding of privacy and security of the video platform. The patient has agreed to participate and understands they can discontinue the visit at any time.    I have spent a total time of 5 minutes in caring for this patient on the day of the visit/encounter including Risks and benefits of tx options, Instructions for management, Patient and family education, Impressions, Counseling / Coordination of care, Documenting in the medical record, Reviewing/placing orders in the medical record (including tests, medications, and/or procedures), and Obtaining or reviewing history  , not including the time spent for establishing the audio/video connection.

## 2025-03-21 ENCOUNTER — OFFICE VISIT (OUTPATIENT)
Dept: PODIATRY | Facility: CLINIC | Age: 80
End: 2025-03-21
Payer: MEDICARE

## 2025-03-21 VITALS — WEIGHT: 126.8 LBS | HEIGHT: 63 IN | BODY MASS INDEX: 22.47 KG/M2

## 2025-03-21 DIAGNOSIS — I73.00 RAYNAUD'S DISEASE WITHOUT GANGRENE: ICD-10-CM

## 2025-03-21 DIAGNOSIS — M20.41 HAMMERTOE OF RIGHT FOOT: ICD-10-CM

## 2025-03-21 DIAGNOSIS — L84 CORNS: Primary | ICD-10-CM

## 2025-03-21 PROCEDURE — 99213 OFFICE O/P EST LOW 20 MIN: CPT | Performed by: PODIATRIST

## 2025-03-21 NOTE — PROGRESS NOTES
Patient ID: Luisa Granados is a 80 y.o. female Date of Birth 1945       Chief Complaint   Patient presents with    Foot Problem     Pain on 3rd toe on right foot              Diagnosis:  1. Corns  2. Raynaud's disease without gangrene  3. Hammertoe of right foot    Pedal examination with socks and shoes removed bilaterally.  Right foot with plantar prominent metatarsal head 3 with hyperkeratosis no foreign body, hpk was trimmed and patient was given a metatarsal pad to and instructed how to apply it to her sneaker.  Hyperkeratosis right fifth toe, right third toe were trimmed without incident, silicone toe sleeve was dispensed for her right fifth toe, and toe crest pad was dispensed to help with third hammertoe, she states it feels much better with this.  Patient to continue following with PCP for Raynaud's therapy/treatment with amlodipine which she is doing very well.  Today we discussed wider toebox, deeper toebox shoes to help accommodate her hammertoes.  She will follow-up as needed.          Subjective:   3/21/25 - Jerri presents today for evaluation and care of corn 3rd toe right foot.  She continue to be on amlodipine for Raynaud's, she states the symptoms are much better, she had a great winter.  No new complaints.        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)     Hammer toe 1995    Amputated 10/28/22    Hypertension 4/22    Metoprolol/Amlodipine    Menopause ovarian failure 1995    Plantar fasciitis 2000    Resolved    Transaminitis     Last assessed: Mike 3, 2014    Varicella 1954    Varicose veins with pain, unspecified laterality     Resolved: June 6, 2017     Vasovagal syncope     last assessed: Dec 30, 2013       Past Surgical History:   Procedure Laterality Date    AMPUTATION  10/28/22    Rt second toe    BREAST BIOPSY      unsure laterality or date of biopsy    CATARACT EXTRACTION, BILATERAL Bilateral 2018 january and april    COLONOSCOPY      1/2018: hemorrhoids, no polyps.    2/13/13 - Hemorrhoids, repeat 5 years d/t family h/o colon cancer    EYE SURGERY  2017/2018    Cataracts surgery OU, bilateral iStents    FOOT SURGERY      mortons neuroma    MOUTH SURGERY      Tooth Extraction - last assessed: Aug 19, 2014    MOUTH SURGERY N/A 11/2017    PERIPHERAL ANGIOGRAM      LA AMPUTATION TOE METATARSOPHALANGEAL JOINT Right 10/28/2022    Procedure: AMPUTATION TOE Right Second;  Surgeon: Derrick Mondragon DPM;  Location:  MAIN OR;  Service: Podiatry    SKIN BIOPSY Right 07/2018    right thigh    TONSILLECTOMY      last assessed: Aug 19, 2014    UPPER GASTROINTESTINAL ENDOSCOPY         Social History     Socioeconomic History    Marital status: /Civil Union     Spouse name: None    Number of children: 2    Years of education: None    Highest education level: None   Occupational History    None   Tobacco Use    Smoking status: Never    Smokeless tobacco: Never   Vaping Use    Vaping status: Never Used   Substance and Sexual Activity    Alcohol use: Yes     Alcohol/week: 5.0 standard drinks of alcohol     Types: 5 Glasses of wine per week     Comment: 1 per day - social/ drinks wine     Drug use: No    Sexual activity: Yes     Partners: Male     Birth control/protection: Post-menopausal, Male Sterilization   Other Topics Concern    None   Social History Narrative    Always uses seat belt     Caffeine use     [facility]      Social Drivers of Health     Financial Resource Strain: Low Risk  (2/26/2024)    Overall Financial Resource Strain (CARDIA)     Difficulty of Paying Living Expenses: Not hard at all   Food Insecurity: Not on file    Transportation Needs: No Transportation Needs (2/26/2024)    PRAPARE - Transportation     Lack of Transportation (Medical): No     Lack of Transportation (Non-Medical): No   Physical Activity: Not on file   Stress: Not on file   Social Connections: Not on file   Intimate Partner Violence: Not on file   Housing Stability: Not on file          Current Outpatient Medications:     amLODIPine (NORVASC) 5 mg tablet, TAKE 1 TABLET (5 MG TOTAL) BY MOUTH DAILY., Disp: 90 tablet, Rfl: 1    Calcium Citrate-Vitamin D (CALCIUM CITRATE +D PO), Take 1 tablet by mouth 2 (two) times a day, Disp: , Rfl:     Cholecalciferol ([employer] VITAMIN D3 PO), Take 1,000 Int'l Units/day by mouth in the morning, Disp: , Rfl:     estradiol (Yuvafem) 10 MCG TABS vaginal tablet, Insert 1 tablet (10 mcg total) into the vagina 2 (two) times a week, Disp: 24 tablet, Rfl: 3    famotidine (PEPCID) 40 MG tablet, TAKE 1 TABLET BY MOUTH DAILY AT BEDTIME, Disp: 90 tablet, Rfl: 2    latanoprost (XALATAN) 0.005 % ophthalmic solution, INSTILL 1 DROP INTO BOTH EYES AT BEDTIME, Disp: , Rfl:     metoprolol succinate (TOPROL-XL) 25 mg 24 hr tablet, TAKE 1/2 TABLET BY MOUTH DAILY, Disp: 45 tablet, Rfl: 1    nitroglycerin (NITRO-BID) 2 % ointment, Apply 1 inch topically 2 (two) times a day, Disp: 30 g, Rfl: 6    zolpidem (AMBIEN) 5 mg tablet, TAKE 1/2 TO 1 TABLET BY MOUTH DAILY AS NEEDED FOR SLEEP, Disp: 15 tablet, Rfl: 0    betamethasone, augmented, (DIPROLENE) 0.05 % ointment, , Disp: , Rfl: 1    Allergies  Patient has no known allergies.    Family History   Problem Relation Age of Onset    Colon polyps Mother     Colon cancer Mother         x2: colostomy; ileostomy    Endometrial cancer Mother         60's    Cancer Mother         Endometrial/ hysterectomy; [place]-rectal cancer w/ colostomy; Colon cancer w/ colostomy revision    Heart disease Mother         A Fib    Hearing loss Mother     Vision loss Mother         Macular degeneration    Lung cancer Father 88     "Cancer Father         Lung    COPD Father         Long time smoker    Hypertension Sister         new onset 9/22    No Known Problems Maternal Grandmother     No Known Problems Maternal Grandfather     No Known Problems Paternal Grandmother     No Known Problems Paternal Grandfather     Colon cancer Brother 61        Colon cancer    Cancer Brother         Colon cancer    Colon cancer Maternal Aunt         unknown age of onset    No Known Problems Maternal Aunt     No Known Problems Paternal Aunt     No Known Problems Paternal Aunt     No Known Problems Paternal Aunt     No Known Problems Paternal Aunt     No Known Problems Paternal Aunt            Objective:  Ht 5' 3\" (1.6 m) Comment: stated  Wt 57.5 kg (126 lb 12.8 oz)   LMP  (LMP Unknown)   BMI 22.46 kg/m²     Review of Systems   Constitutional:  Negative for chills and fever.   HENT:  Negative for ear pain and sore throat.    Eyes:  Negative for pain and visual disturbance.   Respiratory:  Negative for cough and shortness of breath.    Cardiovascular:  Negative for chest pain and palpitations.   Gastrointestinal:  Negative for abdominal pain and vomiting.   Genitourinary:  Negative for dysuria and hematuria.   Musculoskeletal:  Negative for arthralgias and back pain.   Skin:  Negative for color change and rash.        Painful corn on right foot   Neurological:  Negative for seizures and syncope.   All other systems reviewed and are negative.      Physical Exam  Constitutional:       Appearance: Normal appearance. She is well-developed and normal weight.   HENT:      Head: Normocephalic and atraumatic.      Nose: Nose normal.      Mouth/Throat:      Mouth: Mucous membranes are moist.      Pharynx: Oropharynx is clear.   Eyes:      Conjunctiva/sclera: Conjunctivae normal.   Cardiovascular:      Pulses:           Dorsalis pedis pulses are 2+ on the right side and 2+ on the left side.        Posterior tibial pulses are 2+ on the right side and 2+ on the left side. " "  Pulmonary:      Effort: Pulmonary effort is normal.   Musculoskeletal:         General: Normal range of motion.      Cervical back: Normal range of motion.      Right lower leg: No edema.      Left lower leg: No edema.      Right foot: Deformity present.      Left foot: Deformity present.   Feet:      Right foot:      Protective Sensation: 10 sites tested.  10 sites sensed.      Toenail Condition: Right toenails are normal.      Left foot:      Protective Sensation: 10 sites tested.  10 sites sensed.      Toenail Condition: Left toenails are normal.      Comments: Prior second toe amputation right foot, dorsally dislocated rigid hammertoes 3, 4 of right foot with   corn distal lateral plantar tip third toe and medial fifth toe right foot.  Fat pad atrophy submetatarsal heads 1 through 5 and plantar prominent 3rd met head and IPK present  Pes cavus foot type bilateral  Toes are significantly warmer to touch with minimal rubor to distal tips, they are blanchable, capillary filling time is slightly sluggish, proximal to all IPJ's toes are warm and color is within normal limits.  Skin:     General: Skin is warm and dry.      Capillary Refill: Capillary refill takes less than 2 seconds.   Neurological:      General: No focal deficit present.      Mental Status: She is alert and oriented to person, place, and time. Mental status is at baseline.   Psychiatric:         Mood and Affect: Mood normal.         Behavior: Behavior normal.         Thought Content: Thought content normal.         Judgment: Judgment normal.                 No pertinent results found.      Camille Casas, CHARLAM, DPM, FACFAS    Portions of the record may have been created with voice recognition software. Occasional wrong word or \"sound a like\" substitutions may have occurred due to the inherent limitations of voice recognition software. Read the chart carefully and recognize, using context, where substitutions have occurred.  "

## 2025-03-31 ENCOUNTER — OFFICE VISIT (OUTPATIENT)
Dept: FAMILY MEDICINE CLINIC | Facility: HOSPITAL | Age: 80
End: 2025-03-31
Payer: MEDICARE

## 2025-03-31 VITALS
OXYGEN SATURATION: 100 % | HEIGHT: 63 IN | SYSTOLIC BLOOD PRESSURE: 106 MMHG | TEMPERATURE: 97.9 F | DIASTOLIC BLOOD PRESSURE: 60 MMHG | WEIGHT: 129 LBS | HEART RATE: 92 BPM | BODY MASS INDEX: 22.86 KG/M2

## 2025-03-31 DIAGNOSIS — N39.42 URINARY INCONTINENCE WITHOUT SENSORY AWARENESS: ICD-10-CM

## 2025-03-31 DIAGNOSIS — I10 PRIMARY HYPERTENSION: ICD-10-CM

## 2025-03-31 DIAGNOSIS — R09.89 BRUIT OF RIGHT CAROTID ARTERY: ICD-10-CM

## 2025-03-31 DIAGNOSIS — E55.9 VITAMIN D DEFICIENCY: ICD-10-CM

## 2025-03-31 DIAGNOSIS — I73.00 RAYNAUD'S DISEASE WITHOUT GANGRENE: ICD-10-CM

## 2025-03-31 DIAGNOSIS — F51.01 PRIMARY INSOMNIA: ICD-10-CM

## 2025-03-31 DIAGNOSIS — Z00.00 MEDICARE ANNUAL WELLNESS VISIT, SUBSEQUENT: ICD-10-CM

## 2025-03-31 DIAGNOSIS — E83.52 HYPERCALCEMIA: Primary | ICD-10-CM

## 2025-03-31 PROCEDURE — 99214 OFFICE O/P EST MOD 30 MIN: CPT | Performed by: INTERNAL MEDICINE

## 2025-03-31 PROCEDURE — G0439 PPPS, SUBSEQ VISIT: HCPCS | Performed by: INTERNAL MEDICINE

## 2025-03-31 PROCEDURE — G2211 COMPLEX E/M VISIT ADD ON: HCPCS | Performed by: INTERNAL MEDICINE

## 2025-03-31 RX ORDER — ZOLPIDEM TARTRATE 5 MG/1
TABLET ORAL
Qty: 15 TABLET | Refills: 0 | Status: SHIPPED | OUTPATIENT
Start: 2025-03-31

## 2025-03-31 NOTE — PROGRESS NOTES
Name: Luisa Granados      : 1945      MRN: [id number]  Encounter Provider: Sherry Kc DO  Encounter Date: 3/31/2025   Encounter department: [facility] [address]     Assessment & Plan  Hypercalcemia  Repeat ICa nml, may con't current Ca/supplement at this time, recheck BW annually       Vitamin D deficiency  Vit D levels at goal, con't current Vit D, recheck BW annually       Primary hypertension  BP great, pt unhappy with HR and feeling her HR is higher then it used to be- can increase Metoprolol from 25 mg 1/2 tab PO q day to 1/2 tab qod alternating with 1 tab qod and if tolerates - warned saad with HR low nml, call with issues/SE       Raynaud's disease without gangrene  Doing well with Amlodipine, con't current regimen, call with new/worse symptoms/ulcers       Bruit of right carotid artery  Check CUS, will follow  Orders:    VAS carotid complete study; Future    Medicare annual wellness visit, subsequent         Urinary incontinence without sensory awareness  Kegel exercises and lifestyle modifications reviewed, call with any s/sx of UTI         Depression Screening and Follow-up Plan: Patient was screened for depression during today's encounter. They screened negative with a PHQ-2 score of 0.      Urinary Incontinence Plan of Care: counseling topics discussed: practice Kegel (pelvic floor strengthening) exercises, use restroom every 2 hours, limit alcohol, caffeine, spicy foods, and acidic foods, limiting fluid intake 3-4 hours before bed and preventing constipation.       Preventive health issues were discussed with patient, and age appropriate screening tests were ordered as noted in patient's After Visit Summary. Personalized health advice and appropriate referrals for health education or preventive services given if needed, as noted in patient's After Visit Summary.      Colonoscopy  - 3 yrs    Mammo     Dexa  - osteopenia    BW       History of  Present Illness     HPI  Pt here for follow up appt/Bw results and AWV    BW results were d/w pt in detail: Ca 10.5, rest of CMP/CBC/TSH/FLP/Vit D were all wnl. Repeat Ca (Ica) was nml     Pt is taking her Vit D w/o SE    BP at goal today and meds were reviewed and no changes have occurred.  She denies missing doses of meds or SE with the meds.  She does not check her BP outside the office.  She notes no frequent Ha's/dizziness/double vision/CP.     She is using 1/3 of a tab of Zolpidem as needed for insomnia. She notes the rx helps when she uses it. She notes no sedation/grogginess the next day.       Patient Care Team:  Sherry Kc DO as PCP - General  DO Jess Khan DO (Dermatology)  Camille Casas DPM (Podiatry)    Review of Systems   Constitutional:  Negative for chills and fever.   HENT:  Negative for congestion, hearing loss and trouble swallowing.    Eyes:  Negative for pain and visual disturbance.   Respiratory:  Negative for cough, shortness of breath and wheezing.    Cardiovascular:  Negative for chest pain, palpitations and leg swelling.   Gastrointestinal:  Negative for abdominal pain, blood in stool, constipation, diarrhea, nausea and vomiting.   Genitourinary:  Negative for difficulty urinating and dysuria.   Musculoskeletal:  Negative for back pain and gait problem.   Skin:  Negative for rash and wound.   Neurological:  Negative for dizziness and headaches.   Hematological:  Does not bruise/bleed easily.   Psychiatric/Behavioral:  Negative for confusion and dysphoric mood.      Medical History Reviewed by provider this encounter:       Annual Wellness Visit Questionnaire   Luisa is here for her Subsequent Wellness visit. Last Medicare Wellness visit information reviewed, patient interviewed and updates made to the record today.      Health Risk Assessment:   Patient rates overall health as very good. Patient feels that their physical health rating is slightly worse.  Patient is very satisfied with their life. Eyesight was rated as same. Hearing was rated as same. Patient feels that their emotional and mental health rating is same. Patients states they are never, rarely angry. Patient states they are never, rarely unusually tired/fatigued. Pain experienced in the last 7 days has been some. Patient's pain rating has been 4/10. Patient states that she has experienced no weight loss or gain in last 6 months. Physical health worse with recent dx of Raynaud's and having to take meds now    Depression Screening:   PHQ-2 Score: 0      Fall Risk Screening:   In the past year, patient has experienced: no history of falling in past year      Urinary Incontinence Screening:   Patient has leaked urine accidently in the last six months. Panti-liner is adequate, leakage of urine w/o awareness    Home Safety:  Patient does not have trouble with stairs inside or outside of their home. Patient has working smoke alarms and has working carbon monoxide detector. Home safety hazards include: none.     Nutrition:   Current diet is Regular and Limited junk food.     Medications:   Patient is currently taking over-the-counter supplements. OTC medications include: see medication list. Patient is able to manage medications.     Activities of Daily Living (ADLs)/Instrumental Activities of Daily Living (IADLs):   Walk and transfer into and out of bed and chair?: Yes  Dress and groom yourself?: Yes    Bathe or shower yourself?: Yes    Feed yourself? Yes  Do your laundry/housekeeping?: Yes  Manage your money, pay your bills and track your expenses?: Yes  Make your own meals?: Yes    Do your own shopping?: Yes    Previous Hospitalizations:   Any hospitalizations or ED visits within the last 12 months?: No      Advance Care Planning:   Living will: Yes    Durable POA for healthcare: Yes    Advanced directive: Yes      Cognitive Screening:   Provider or family/friend/caregiver concerned regarding cognition?:  No    PREVENTIVE SCREENINGS      Cardiovascular Screening:    General: Screening Current and Risks and Benefits Discussed      Diabetes Screening:     General: Screening Current and Risks and Benefits Discussed      Colorectal Cancer Screening:     General: Screening Current      Breast Cancer Screening:     General: Screening Current and Risks and Benefits Discussed      Cervical Cancer Screening:    General: Screening Not Indicated, Risks and Benefits Discussed and Screening Current      Osteoporosis Screening:    General: Screening Current and Risks and Benefits Discussed      Abdominal Aortic Aneurysm (AAA) Screening:        General: Risks and Benefits Discussed and Screening Not Indicated      Lung Cancer Screening:     General: Screening Not Indicated and Risks and Benefits Discussed      Hepatitis C Screening:    General: Screening Current and Risks and Benefits Discussed    Screening, Brief Intervention, and Referral to Treatment (SBIRT)     Screening  Typical number of drinks in a day: 1  Typical number of drinks in a week: 2  Interpretation: Low risk drinking behavior.    AUDIT-C Screenin) How often did you have a drink containing alcohol in the past year? 2 to 3 times a week  2) How many drinks did you have on a typical day when you were drinking in the past year? 1 to 2  3) How often did you have 6 or more drinks on one occasion in the past year? never    AUDIT-C Score: 3  Interpretation: Score 3-12 (female): POSITIVE screen for alcohol misuse    AUDIT Screenin) How often during the last year have you found that you were not able to stop drinking once you had started? 0 - never  5) How often during the last year have you failed to do what was normally expected from you because of drinking? 0 - never  6) How often during the last year have you needed a first drink in the morning to get yourself going after a heavy drinking session? 0 - never  7) How often during the last year have you had a  feeling of guilt or remorse after drinking? 0 - never  8) How often during the last year have you been unable to remember what happened the night before because you had been drinking? 0 - never  9) Have you or someone else been injured as a result of your drinking? 0 - no  10) Has a relative or friend or a doctor or another health worker been concerned about your drinking or suggested you cut down? 0 - no    AUDIT Score: 3  Interpretation: Low risk alcohol consumption    Single Item Drug Screening:  How often have you used an illegal drug (including marijuana) or a prescription medication for non-medical reasons in the past year? never    Single Item Drug Screen Score: 0  Interpretation: Negative screen for possible drug use disorder    Other Counseling Topics:   Regular weightbearing exercise and calcium and vitamin D intake.     Social Drivers of Health     Financial Resource Strain: Low Risk  (2/26/2024)    Overall Financial Resource Strain (CARDIA)     Difficulty of Paying Living Expenses: Not hard at all   Food Insecurity: No Food Insecurity (3/30/2025)    Hunger Vital Sign     Worried About Running Out of Food in the Last Year: Never true     Ran Out of Food in the Last Year: Never true   Transportation Needs: No Transportation Needs (3/30/2025)    PRAPARE - Transportation     Lack of Transportation (Medical): No     Lack of Transportation (Non-Medical): No   Housing Stability: Low Risk  (3/30/2025)    Housing Stability Vital Sign     Unable to Pay for Housing in the Last Year: No     Number of Times Moved in the Last Year: 0     Homeless in the Last Year: No   Utilities: Not At Risk (3/30/2025)    [facility] Utilities     Threatened with loss of utilities: No     Vision Screening    Right eye Left eye Both eyes   Without correction 20/30 20/25 20/25   With correction          Objective   /60 (BP Location: Left arm, Patient Position: Sitting, Cuff Size: Standard)   Pulse 92   Temp 97.9 °F (36.6 °C)   Ht 5'  "3\" (1.6 m)   Wt 58.5 kg (129 lb)   LMP  (LMP Unknown)   SpO2 100%   BMI 22.85 kg/m²     Physical Exam  Vitals and nursing note reviewed.   Constitutional:       General: She is not in acute distress.     Appearance: She is well-developed. She is not ill-appearing.   HENT:      Head: Normocephalic and atraumatic.      Right Ear: Tympanic membrane and external ear normal. There is no impacted cerumen.      Left Ear: Tympanic membrane and external ear normal. There is no impacted cerumen.      Mouth/Throat:      Mouth: Mucous membranes are moist.      Pharynx: Oropharynx is clear. No oropharyngeal exudate.   Eyes:      General:         Right eye: No discharge.         Left eye: No discharge.      Conjunctiva/sclera: Conjunctivae normal.   Neck:      Thyroid: No thyromegaly.      Vascular: Carotid bruit present.      Trachea: No tracheal deviation.      Comments: R carotid bruits  Cardiovascular:      Rate and Rhythm: Normal rate and regular rhythm.      Heart sounds: Normal heart sounds. No murmur heard.  Pulmonary:      Effort: Pulmonary effort is normal. No respiratory distress.      Breath sounds: Normal breath sounds. No wheezing, rhonchi or rales.   Abdominal:      General: There is no distension.      Palpations: Abdomen is soft.      Tenderness: There is no abdominal tenderness. There is no guarding or rebound.   Musculoskeletal:         General: No deformity or signs of injury.      Cervical back: Neck supple.   Lymphadenopathy:      Cervical: No cervical adenopathy.   Skin:     General: Skin is warm and dry.      Coloration: Skin is not pale.      Findings: No bruising or rash.   Neurological:      General: No focal deficit present.      Mental Status: She is alert. Mental status is at baseline.      Motor: No abnormal muscle tone.      Gait: Gait normal.   Psychiatric:         Mood and Affect: Mood normal.         Behavior: Behavior normal.         Thought Content: Thought content normal.         " Judgment: Judgment normal.

## 2025-03-31 NOTE — PATIENT INSTRUCTIONS
Medicare Preventive Visit Patient Instructions  Thank you for completing your Welcome to Medicare Visit or Medicare Annual Wellness Visit today. Your next wellness visit will be due in one year (4/1/2026).  The screening/preventive services that you may require over the next 5-10 years are detailed below. Some tests may not apply to you based off risk factors and/or age. Screening tests ordered at today's visit but not completed yet may show as past due. Also, please note that scanned in results may not display below.  Preventive Screenings:  Service Recommendations Previous Testing/Comments   Colorectal Cancer Screening  * Colonoscopy    * Fecal Occult Blood Test (FOBT)/Fecal Immunochemical Test (FIT)  * Fecal DNA/Cologuard Test  * Flexible Sigmoidoscopy Age: 45-75 years old   Colonoscopy: every 10 years (may be performed more frequently if at higher risk)  OR  FOBT/FIT: every 1 year  OR  Cologuard: every 3 years  OR  Sigmoidoscopy: every 5 years  Screening may be recommended earlier than age 45 if at higher risk for colorectal cancer. Also, an individualized decision between you and your healthcare provider will decide whether screening between the ages of 76-85 would be appropriate. Colonoscopy: 04/25/2023  FOBT/FIT: Not on file  Cologuard: Not on file  Sigmoidoscopy: Not on file    Screening Current     Breast Cancer Screening Age: 40+ years old  Frequency: every 1-2 years  Not required if history of left and right mastectomy Mammogram: 02/17/2025    Screening Current   Cervical Cancer Screening Between the ages of 21-29, pap smear recommended once every 3 years.   Between the ages of 30-65, can perform pap smear with HPV co-testing every 5 years.   Recommendations may differ for women with a history of total hysterectomy, cervical cancer, or abnormal pap smears in past. Pap Smear: 02/24/2025    Screening Not Indicated   Hepatitis C Screening Once for adults born between 1945 and 1965  More frequently in  patients at high risk for Hepatitis C Hep C Antibody: 01/07/2020    Screening Current   Diabetes Screening 1-2 times per year if you're at risk for diabetes or have pre-diabetes Fasting glucose: 88 mg/dL (2/17/2025)  A1C: No results in last 5 years (No results in last 5 years)  Screening Current   Cholesterol Screening Once every 5 years if you don't have a lipid disorder. May order more often based on risk factors. Lipid panel: 02/17/2025    Screening Current     Other Preventive Screenings Covered by Medicare:  Abdominal Aortic Aneurysm (AAA) Screening: covered once if your at risk. You're considered to be at risk if you have a family history of AAA.  Lung Cancer Screening: covers low dose CT scan once per year if you meet all of the following conditions: (1) Age 55-77; (2) No signs or symptoms of lung cancer; (3) Current smoker or have quit smoking within the last 15 years; (4) You have a tobacco smoking history of at least 20 pack years (packs per day multiplied by number of years you smoked); (5) You get a written order from a healthcare provider.  Glaucoma Screening: covered annually if you're considered high risk: (1) You have diabetes OR (2) Family history of glaucoma OR (3)  aged 50 and older OR (4)  American aged 65 and older  Osteoporosis Screening: covered every 2 years if you meet one of the following conditions: (1) You're estrogen deficient and at risk for osteoporosis based off medical history and other findings; (2) Have a vertebral abnormality; (3) On glucocorticoid therapy for more than 3 months; (4) Have primary hyperparathyroidism; (5) On osteoporosis medications and need to assess response to drug therapy.   Last bone density test (DXA Scan): 02/15/2024.  HIV Screening: covered annually if you're between the age of 15-65. Also covered annually if you are younger than 15 and older than 65 with risk factors for HIV infection. For pregnant patients, it is covered up to 3  times per pregnancy.    Immunizations:  Immunization Recommendations   Influenza Vaccine Annual influenza vaccination during flu season is recommended for all persons aged >= 6 months who do not have contraindications   Pneumococcal Vaccine   * Pneumococcal conjugate vaccine = PCV13 (Prevnar 13), PCV15 (Vaxneuvance), PCV20 (Prevnar 20)  * Pneumococcal polysaccharide vaccine = PPSV23 (Pneumovax) Adults 19-63 yo with certain risk factors or if 65+ yo  If never received any pneumonia vaccine: recommend Prevnar 20 (PCV20)  Give PCV20 if previously received 1 dose of PCV13 or PPSV23   Hepatitis B Vaccine 3 dose series if at intermediate or high risk (ex: diabetes, end stage renal disease, liver disease)   Respiratory syncytial virus (RSV) Vaccine - COVERED BY MEDICARE PART D  * RSVPreF3 (Arexvy) CDC recommends that adults 60 years of age and older may receive a single dose of RSV vaccine using shared clinical decision-making (SCDM)   Tetanus (Td) Vaccine - COST NOT COVERED BY MEDICARE PART B Following completion of primary series, a booster dose should be given every 10 years to maintain immunity against tetanus. Td may also be given as tetanus wound prophylaxis.   Tdap Vaccine - COST NOT COVERED BY MEDICARE PART B Recommended at least once for all adults. For pregnant patients, recommended with each pregnancy.   Shingles Vaccine (Shingrix) - COST NOT COVERED BY MEDICARE PART B  2 shot series recommended in those 19 years and older who have or will have weakened immune systems or those 50 years and older     Health Maintenance Due:      Topic Date Due   • DXA SCAN  02/15/2026   • Breast Cancer Screening: Mammogram  02/17/2026   • Colorectal Cancer Screening  04/25/2028   • Hepatitis C Screening  Completed     Immunizations Due:      Topic Date Due   • Influenza Vaccine (1) 09/01/2024   • COVID-19 Vaccine (8 - 2024-25 season) 09/01/2024     Advance Directives   What are advance directives?  Advance directives are legal  documents that state your wishes and plans for medical care. These plans are made ahead of time in case you lose your ability to make decisions for yourself. Advance directives can apply to any medical decision, such as the treatments you want, and if you want to donate organs.   What are the types of advance directives?  There are many types of advance directives, and each state has rules about how to use them. You may choose a combination of any of the following:  Living will:  This is a written record of the treatment you want. You can also choose which treatments you do not want, which to limit, and which to stop at a certain time. This includes surgery, medicine, IV fluid, and tube feedings.   Durable power of  for healthcare (DPAHC):  This is a written record that states who you want to make healthcare choices for you when you are unable to make them for yourself. This person, called a proxy, is usually a family member or a friend. You may choose more than 1 proxy.  Do not resuscitate (DNR) order:  A DNR order is used in case your heart stops beating or you stop breathing. It is a request not to have certain forms of treatment, such as CPR. A DNR order may be included in other types of advance directives.  Medical directive:  This covers the care that you want if you are in a coma, near death, or unable to make decisions for yourself. You can list the treatments you want for each condition. Treatment may include pain medicine, surgery, blood transfusions, dialysis, IV or tube feedings, and a ventilator (breathing machine).  Values history:  This document has questions about your views, beliefs, and how you feel and think about life. This information can help others choose the care that you would choose.  Why are advance directives important?  An advance directive helps you control your care. Although spoken wishes may be used, it is better to have your wishes written down. Spoken wishes can be  misunderstood, or not followed. Treatments may be given even if you do not want them. An advance directive may make it easier for your family to make difficult choices about your care.   Urinary Incontinence   Urinary incontinence (UI)  is when you lose control of your bladder. UI develops because your bladder cannot store or empty urine properly. The 3 most common types of UI are stress incontinence, urge incontinence, or both.  Medicines:   May be given to help strengthen your bladder control. Report any side effects of medication to your healthcare provider.  Do pelvic muscle exercises often:  Your pelvic muscles help you stop urinating. Squeeze these muscles tight for 5 seconds, then relax for 5 seconds. Gradually work up to squeezing for 10 seconds. Do 3 sets of 15 repetitions a day, or as directed. This will help strengthen your pelvic muscles and improve bladder control.  Train your bladder:  Go to the bathroom at set times, such as every 2 hours, even if you do not feel the urge to go. You can also try to hold your urine when you feel the urge to go. For example, hold your urine for 5 minutes when you feel the urge to go. As that becomes easier, hold your urine for 10 minutes.   Self-care:   Keep a UI record.  Write down how often you leak urine and how much you leak. Make a note of what you were doing when you leaked urine.  Drink liquids as directed. You may need to limit the amount of liquid you drink to help control your urine leakage. Do not drink any liquid right before you go to bed. Limit or do not have drinks that contain caffeine or alcohol.   Prevent constipation.  Eat a variety of high-fiber foods. Good examples are high-fiber cereals, beans, vegetables, and whole-grain breads. Walking is the best way to trigger your intestines to have a bowel movement.  Exercise regularly and maintain a healthy weight.  Weight loss and exercise will decrease pressure on your bladder and help you control your  "leakage.   Use a catheter as directed  to help empty your bladder. A catheter is a tiny, plastic tube that is put into your bladder to drain your urine.   Go to behavior therapy as directed.  Behavior therapy may be used to help you learn to control your urge to urinate.    Alcohol Use and Your Health    Drinking too much can harm your health.  Excessive alcohol use leads to about 88,000 death in the [place] each year, and shortens the life of those who diet by almost 30 years.  Further, excessive drinking cost the economy $249 billion in 2010.  Most excessive drinkers are not alcohol dependent.    Excessive alcohol use has immediate effects that increase the risk of many harmful health conditions.  These are most often the result of binge drinking.  Over time, excessive alcohol use can lead to the development of chronic diseases and other series health problems.    What is considered a \"drink\"?        Excessive alcohol use includes:  Binge Drinking: For women, 4 or more drinks consumed on one occasion. For men, 5 or more drinks consumed on one occasion.  Heavy Drinking: For women, 8 or more drinks per week. For men, 15 or more drinks per week  Any alcohol used by pregnant women  Any alcohol used by those under the age of 21 years    If you choose to drink, do so in moderation:  Do not drink at all if you are under the age of 21, or if you are or may be pregnant, or have health problems that could be made worse by drinking.  For women, up to 1 drink per day  For men, up to 2 drinks a day    No one should begin drinking or drink more frequently based on potential health benefits    Short-Term Health Risks:  Injuries: motor vehicle crashes, falls, drownings, burns  Violence: homicide, suicide, sexual assault, intimate partner violence  Alcohol poisoning  Reproductive health: risky sexual behaviors, unintended prengnacy, sexually transmitted diseases, miscarriage, stillbirth, fetal alcohol syndrome    Long-Term " Health Risks:  Chronic diseases: high blood pressure, heart disease, stroke, liver disease, digestive problems  Cancers: breast, mouth and throat, liver, colon  Learning and memory problems: dementia, poor school performance  Mental health: depression, anxiety, insomnia  Social problems: lost productivity, family problems, unemployment  Alcohol dependence    For support and more information:  Substance Abuse and Mental Health Services Administration  [address]  [place] [address]  Web Address: http://www.[facility].gov    Alcoholics Anonymous        Web Address: http://www.aa.org    https://www.cdc.gov/alcohol/fact-sheets/alcohol-use.htm     © Copyright [employer] 2018 Information is for End User's use only and may not be sold, redistributed or otherwise used for commercial purposes. All illustrations and images included in CareNotes® are the copyrighted property of [employer] or [employer]

## 2025-03-31 NOTE — ASSESSMENT & PLAN NOTE
BP great, pt unhappy with HR and feeling her HR is higher then it used to be- can increase Metoprolol from 25 mg 1/2 tab PO q day to 1/2 tab qod alternating with 1 tab qod and if tolerates - warned saad with HR low nml, call with issues/SE

## 2025-05-12 DIAGNOSIS — I10 PRIMARY HYPERTENSION: ICD-10-CM

## 2025-05-12 RX ORDER — METOPROLOL SUCCINATE 25 MG/1
25 TABLET, EXTENDED RELEASE ORAL DAILY
Qty: 90 TABLET | Refills: 1 | Status: SHIPPED | OUTPATIENT
Start: 2025-05-12

## 2025-06-17 ENCOUNTER — RESULTS FOLLOW-UP (OUTPATIENT)
Dept: FAMILY MEDICINE CLINIC | Facility: HOSPITAL | Age: 80
End: 2025-06-17

## 2025-06-17 ENCOUNTER — HOSPITAL ENCOUNTER (OUTPATIENT)
Dept: NON INVASIVE DIAGNOSTICS | Facility: HOSPITAL | Age: 80
Discharge: HOME | End: 2025-06-17
Attending: INTERNAL MEDICINE
Payer: MEDICARE

## 2025-06-17 DIAGNOSIS — R09.89 BRUIT OF RIGHT CAROTID ARTERY: ICD-10-CM

## 2025-06-17 PROCEDURE — 93880 EXTRACRANIAL BILAT STUDY: CPT

## 2025-06-17 PROCEDURE — 93880 EXTRACRANIAL BILAT STUDY: CPT | Performed by: SURGERY

## 2025-07-01 DIAGNOSIS — R00.2 PALPITATIONS: Primary | ICD-10-CM

## 2025-07-11 ENCOUNTER — HOSPITAL ENCOUNTER (OUTPATIENT)
Dept: NON INVASIVE DIAGNOSTICS | Age: 80
Discharge: HOME | End: 2025-07-11
Attending: INTERNAL MEDICINE
Payer: MEDICARE

## 2025-07-11 DIAGNOSIS — R00.2 PALPITATIONS: ICD-10-CM

## 2025-07-11 PROCEDURE — 93225 XTRNL ECG REC<48 HRS REC: CPT

## 2025-07-11 PROCEDURE — 93226 XTRNL ECG REC<48 HR SCAN A/R: CPT

## 2025-07-16 ENCOUNTER — RESULTS FOLLOW-UP (OUTPATIENT)
Dept: FAMILY MEDICINE CLINIC | Facility: HOSPITAL | Age: 80
End: 2025-07-16

## 2025-07-16 PROCEDURE — 93227 XTRNL ECG REC<48 HR R&I: CPT | Performed by: INTERNAL MEDICINE

## 2025-07-16 NOTE — TELEPHONE ENCOUNTER
Please call.      Reviewing for Dr Kc.      Holter monitor showing a lot of supraventricular beats.   If she is not yet seeing a Cardiologist ( I do not see anything in her chart) I would recommend to see [facility] cardiology. I will place that referral if she agrees     In the meantime to stay on the metoprolol.

## 2025-07-31 ENCOUNTER — OFFICE VISIT (OUTPATIENT)
Dept: FAMILY MEDICINE CLINIC | Facility: HOSPITAL | Age: 80
End: 2025-07-31
Payer: MEDICARE

## 2025-07-31 VITALS
HEART RATE: 62 BPM | BODY MASS INDEX: 22.61 KG/M2 | OXYGEN SATURATION: 98 % | TEMPERATURE: 97 F | HEIGHT: 63 IN | SYSTOLIC BLOOD PRESSURE: 122 MMHG | DIASTOLIC BLOOD PRESSURE: 70 MMHG | WEIGHT: 127.6 LBS

## 2025-07-31 DIAGNOSIS — I65.23 ATHEROSCLEROSIS OF BOTH CAROTID ARTERIES: Primary | ICD-10-CM

## 2025-07-31 DIAGNOSIS — I47.10 NONSUSTAINED SUPRAVENTRICULAR TACHYCARDIA (HCC): ICD-10-CM

## 2025-07-31 DIAGNOSIS — Z12.83 SCREENING FOR SKIN CANCER: ICD-10-CM

## 2025-07-31 DIAGNOSIS — E55.9 VITAMIN D DEFICIENCY: ICD-10-CM

## 2025-07-31 DIAGNOSIS — I10 PRIMARY HYPERTENSION: ICD-10-CM

## 2025-07-31 DIAGNOSIS — Z13.6 SCREENING FOR CARDIOVASCULAR CONDITION: ICD-10-CM

## 2025-07-31 PROCEDURE — G2211 COMPLEX E/M VISIT ADD ON: HCPCS | Performed by: INTERNAL MEDICINE

## 2025-07-31 PROCEDURE — 99214 OFFICE O/P EST MOD 30 MIN: CPT | Performed by: INTERNAL MEDICINE

## 2025-08-18 ENCOUNTER — HOSPITAL ENCOUNTER (OUTPATIENT)
Dept: CT IMAGING | Facility: HOSPITAL | Age: 80
Discharge: HOME | End: 2025-08-18
Attending: INTERNAL MEDICINE
Payer: COMMERCIAL

## 2025-08-18 DIAGNOSIS — Z13.6 SCREENING FOR CARDIOVASCULAR CONDITION: ICD-10-CM

## 2025-08-18 DIAGNOSIS — I65.23 ATHEROSCLEROSIS OF BOTH CAROTID ARTERIES: ICD-10-CM

## 2025-08-18 PROCEDURE — 75571 CT HRT W/O DYE W/CA TEST: CPT

## (undated) DEVICE — DISPOSABLE OR TOWEL: Brand: CARDINAL HEALTH

## (undated) DEVICE — NEEDLE 25G X 1 1/2

## (undated) DEVICE — STERILE POLYISOPRENE POWDER-FREE SURGICAL GLOVES WITH EMOLLIENT COATING: Brand: PROTEXIS

## (undated) DEVICE — CURITY STRETCH BANDAGE: Brand: CURITY

## (undated) DEVICE — ACE WRAP 4 IN UNSTERILE

## (undated) DEVICE — KERLIX BANDAGE ROLL: Brand: KERLIX

## (undated) DEVICE — SUT ETHILON 4-0 PS-2 18 IN 1667H

## (undated) DEVICE — STERILE POLYISOPRENE POWDER-FREE SURGICAL GLOVES: Brand: PROTEXIS

## (undated) DEVICE — INTENDED FOR TISSUE SEPARATION, AND OTHER PROCEDURES THAT REQUIRE A SHARP SURGICAL BLADE TO PUNCTURE OR CUT.: Brand: BARD-PARKER ® SAFETYLOCK CARBON RIB-BACK BLADES

## (undated) DEVICE — OCCLUSIVE GAUZE STRIP,3% BISMUTH TRIBROMOPHENATE IN PETROLATUM BLEND: Brand: XEROFORM

## (undated) DEVICE — CHLORAPREP HI-LITE 26ML ORANGE

## (undated) DEVICE — PENCIL ELECTROSURG E-Z CLEAN -0035H

## (undated) DEVICE — BETHLEHEM UNIVERSAL  MIONR EXT: Brand: CARDINAL HEALTH

## (undated) DEVICE — SINGLE PORT MANIFOLD: Brand: NEPTUNE 2

## (undated) DEVICE — SCD SEQUENTIAL COMPRESSION COMFORT SLEEVE MEDIUM KNEE LENGTH: Brand: KENDALL SCD

## (undated) DEVICE — SYRINGE 10ML LL

## (undated) DEVICE — INTENDED FOR TISSUE SEPARATION, AND OTHER PROCEDURES THAT REQUIRE A SHARP SURGICAL BLADE TO PUNCTURE OR CUT.: Brand: BARD-PARKER SAFETY BLADES SIZE 15, STERILE

## (undated) DEVICE — NEEDLE BLUNT 18 G X 1 1/2IN